# Patient Record
Sex: FEMALE | Race: WHITE | URBAN - NONMETROPOLITAN AREA
[De-identification: names, ages, dates, MRNs, and addresses within clinical notes are randomized per-mention and may not be internally consistent; named-entity substitution may affect disease eponyms.]

---

## 2017-01-04 RX ORDER — PAROXETINE HYDROCHLORIDE 20 MG/1
20 TABLET, FILM COATED ORAL EVERY MORNING
Qty: 30 TABLET | Refills: 0 | Status: SHIPPED | OUTPATIENT
Start: 2017-01-04 | End: 2017-02-03 | Stop reason: SDUPTHER

## 2017-01-13 ENCOUNTER — OFFICE VISIT (OUTPATIENT)
Dept: NEUROLOGY | Age: 72
End: 2017-01-13
Payer: MEDICARE

## 2017-01-13 VITALS
DIASTOLIC BLOOD PRESSURE: 76 MMHG | HEIGHT: 66 IN | BODY MASS INDEX: 27.64 KG/M2 | WEIGHT: 172 LBS | SYSTOLIC BLOOD PRESSURE: 122 MMHG

## 2017-01-13 DIAGNOSIS — R25.2 MUSCLE CRAMP: ICD-10-CM

## 2017-01-13 DIAGNOSIS — G25.81 RESTLESS LEG SYNDROME: ICD-10-CM

## 2017-01-13 DIAGNOSIS — G20 PARKINSON DISEASE (HCC): Primary | ICD-10-CM

## 2017-01-13 PROCEDURE — 1036F TOBACCO NON-USER: CPT | Performed by: PSYCHIATRY & NEUROLOGY

## 2017-01-13 PROCEDURE — G8427 DOCREV CUR MEDS BY ELIG CLIN: HCPCS | Performed by: PSYCHIATRY & NEUROLOGY

## 2017-01-13 PROCEDURE — 3014F SCREEN MAMMO DOC REV: CPT | Performed by: PSYCHIATRY & NEUROLOGY

## 2017-01-13 PROCEDURE — G8400 PT W/DXA NO RESULTS DOC: HCPCS | Performed by: PSYCHIATRY & NEUROLOGY

## 2017-01-13 PROCEDURE — 3017F COLORECTAL CA SCREEN DOC REV: CPT | Performed by: PSYCHIATRY & NEUROLOGY

## 2017-01-13 PROCEDURE — 1123F ACP DISCUSS/DSCN MKR DOCD: CPT | Performed by: PSYCHIATRY & NEUROLOGY

## 2017-01-13 PROCEDURE — 4040F PNEUMOC VAC/ADMIN/RCVD: CPT | Performed by: PSYCHIATRY & NEUROLOGY

## 2017-01-13 PROCEDURE — G8484 FLU IMMUNIZE NO ADMIN: HCPCS | Performed by: PSYCHIATRY & NEUROLOGY

## 2017-01-13 PROCEDURE — 99214 OFFICE O/P EST MOD 30 MIN: CPT | Performed by: PSYCHIATRY & NEUROLOGY

## 2017-01-13 PROCEDURE — G8420 CALC BMI NORM PARAMETERS: HCPCS | Performed by: PSYCHIATRY & NEUROLOGY

## 2017-01-13 PROCEDURE — 1090F PRES/ABSN URINE INCON ASSESS: CPT | Performed by: PSYCHIATRY & NEUROLOGY

## 2017-02-01 RX ORDER — PAROXETINE HYDROCHLORIDE 20 MG/1
TABLET, FILM COATED ORAL
Qty: 30 TABLET | Refills: 0 | OUTPATIENT
Start: 2017-02-01

## 2017-02-06 RX ORDER — PAROXETINE HYDROCHLORIDE 20 MG/1
20 TABLET, FILM COATED ORAL EVERY MORNING
Qty: 30 TABLET | Refills: 6 | OUTPATIENT
Start: 2017-02-06 | End: 2017-08-29 | Stop reason: SDUPTHER

## 2017-02-07 ENCOUNTER — TELEPHONE (OUTPATIENT)
Dept: NEUROLOGY | Age: 72
End: 2017-02-07

## 2017-03-20 ENCOUNTER — TELEPHONE (OUTPATIENT)
Dept: NEUROLOGY | Age: 72
End: 2017-03-20

## 2017-03-20 DIAGNOSIS — G25.81 RESTLESS LEG: ICD-10-CM

## 2017-03-20 DIAGNOSIS — R25.2 MUSCLE CRAMP: ICD-10-CM

## 2017-03-20 DIAGNOSIS — G20 PARKINSON DISEASE (HCC): Primary | ICD-10-CM

## 2017-03-21 ENCOUNTER — TELEPHONE (OUTPATIENT)
Dept: NEUROLOGY | Age: 72
End: 2017-03-21

## 2017-05-16 RX ORDER — ROPINIROLE 0.25 MG/1
0.25 TABLET, FILM COATED ORAL 3 TIMES DAILY
Qty: 90 TABLET | Refills: 11 | Status: SHIPPED | OUTPATIENT
Start: 2017-05-16 | End: 2018-05-10 | Stop reason: SDUPTHER

## 2017-05-26 ENCOUNTER — APPOINTMENT (OUTPATIENT)
Dept: GENERAL RADIOLOGY | Facility: HOSPITAL | Age: 72
End: 2017-05-26
Attending: FAMILY MEDICINE

## 2017-05-26 PROCEDURE — 71020 HC CHEST PA AND LATERAL: CPT

## 2017-05-26 PROCEDURE — 71100 X-RAY EXAM RIBS UNI 2 VIEWS: CPT

## 2017-07-14 ENCOUNTER — OFFICE VISIT (OUTPATIENT)
Dept: NEUROLOGY | Age: 72
End: 2017-07-14
Payer: MEDICARE

## 2017-07-14 VITALS
BODY MASS INDEX: 27.97 KG/M2 | SYSTOLIC BLOOD PRESSURE: 112 MMHG | WEIGHT: 174 LBS | DIASTOLIC BLOOD PRESSURE: 70 MMHG | HEIGHT: 66 IN

## 2017-07-14 DIAGNOSIS — G20 PARKINSON DISEASE (HCC): Primary | ICD-10-CM

## 2017-07-14 DIAGNOSIS — R25.2 MUSCLE CRAMP: ICD-10-CM

## 2017-07-14 DIAGNOSIS — G25.81 RESTLESS LEG: ICD-10-CM

## 2017-07-14 PROCEDURE — G8400 PT W/DXA NO RESULTS DOC: HCPCS | Performed by: PSYCHIATRY & NEUROLOGY

## 2017-07-14 PROCEDURE — G8427 DOCREV CUR MEDS BY ELIG CLIN: HCPCS | Performed by: PSYCHIATRY & NEUROLOGY

## 2017-07-14 PROCEDURE — 1090F PRES/ABSN URINE INCON ASSESS: CPT | Performed by: PSYCHIATRY & NEUROLOGY

## 2017-07-14 PROCEDURE — 99214 OFFICE O/P EST MOD 30 MIN: CPT | Performed by: PSYCHIATRY & NEUROLOGY

## 2017-07-14 PROCEDURE — G8419 CALC BMI OUT NRM PARAM NOF/U: HCPCS | Performed by: PSYCHIATRY & NEUROLOGY

## 2017-07-14 PROCEDURE — 1036F TOBACCO NON-USER: CPT | Performed by: PSYCHIATRY & NEUROLOGY

## 2017-07-14 PROCEDURE — 3014F SCREEN MAMMO DOC REV: CPT | Performed by: PSYCHIATRY & NEUROLOGY

## 2017-07-14 PROCEDURE — 4040F PNEUMOC VAC/ADMIN/RCVD: CPT | Performed by: PSYCHIATRY & NEUROLOGY

## 2017-07-14 PROCEDURE — 3017F COLORECTAL CA SCREEN DOC REV: CPT | Performed by: PSYCHIATRY & NEUROLOGY

## 2017-07-14 PROCEDURE — 1123F ACP DISCUSS/DSCN MKR DOCD: CPT | Performed by: PSYCHIATRY & NEUROLOGY

## 2017-07-28 ENCOUNTER — TELEPHONE (OUTPATIENT)
Dept: NEUROLOGY | Age: 72
End: 2017-07-28

## 2017-08-08 ENCOUNTER — TELEPHONE (OUTPATIENT)
Dept: NEUROLOGY | Age: 72
End: 2017-08-08

## 2017-08-10 ENCOUNTER — TELEPHONE (OUTPATIENT)
Dept: NEUROLOGY | Age: 72
End: 2017-08-10

## 2017-08-15 ENCOUNTER — TELEPHONE (OUTPATIENT)
Dept: NEUROLOGY | Age: 72
End: 2017-08-15

## 2017-08-29 ENCOUNTER — TELEPHONE (OUTPATIENT)
Dept: NEUROLOGY | Age: 72
End: 2017-08-29

## 2017-08-29 RX ORDER — PAROXETINE HYDROCHLORIDE 20 MG/1
20 TABLET, FILM COATED ORAL EVERY MORNING
Qty: 30 TABLET | Refills: 6 | Status: SHIPPED | OUTPATIENT
Start: 2017-08-29 | End: 2018-03-26 | Stop reason: SDUPTHER

## 2017-09-12 ENCOUNTER — TELEPHONE (OUTPATIENT)
Dept: NEUROLOGY | Age: 72
End: 2017-09-12

## 2017-10-16 ENCOUNTER — TELEPHONE (OUTPATIENT)
Dept: NEUROLOGY | Age: 72
End: 2017-10-16

## 2017-10-16 NOTE — TELEPHONE ENCOUNTER
Tyson from [address] [place] [address] called wanting to know if we received forms for patient.

## 2017-10-23 ENCOUNTER — OFFICE VISIT (OUTPATIENT)
Dept: NEUROLOGY | Age: 72
End: 2017-10-23
Payer: MEDICARE

## 2017-10-23 VITALS
HEIGHT: 66 IN | WEIGHT: 172 LBS | DIASTOLIC BLOOD PRESSURE: 65 MMHG | HEART RATE: 73 BPM | SYSTOLIC BLOOD PRESSURE: 111 MMHG | BODY MASS INDEX: 27.64 KG/M2

## 2017-10-23 DIAGNOSIS — R25.2 MUSCLE CRAMP: ICD-10-CM

## 2017-10-23 DIAGNOSIS — G20 PARKINSON DISEASE (HCC): Primary | ICD-10-CM

## 2017-10-23 DIAGNOSIS — G25.81 RESTLESS LEG: ICD-10-CM

## 2017-10-23 PROCEDURE — G8484 FLU IMMUNIZE NO ADMIN: HCPCS | Performed by: PSYCHIATRY & NEUROLOGY

## 2017-10-23 PROCEDURE — G8400 PT W/DXA NO RESULTS DOC: HCPCS | Performed by: PSYCHIATRY & NEUROLOGY

## 2017-10-23 PROCEDURE — 1123F ACP DISCUSS/DSCN MKR DOCD: CPT | Performed by: PSYCHIATRY & NEUROLOGY

## 2017-10-23 PROCEDURE — G8427 DOCREV CUR MEDS BY ELIG CLIN: HCPCS | Performed by: PSYCHIATRY & NEUROLOGY

## 2017-10-23 PROCEDURE — 4040F PNEUMOC VAC/ADMIN/RCVD: CPT | Performed by: PSYCHIATRY & NEUROLOGY

## 2017-10-23 PROCEDURE — 99214 OFFICE O/P EST MOD 30 MIN: CPT | Performed by: PSYCHIATRY & NEUROLOGY

## 2017-10-23 PROCEDURE — G8417 CALC BMI ABV UP PARAM F/U: HCPCS | Performed by: PSYCHIATRY & NEUROLOGY

## 2017-10-23 PROCEDURE — 3014F SCREEN MAMMO DOC REV: CPT | Performed by: PSYCHIATRY & NEUROLOGY

## 2017-10-23 PROCEDURE — 3017F COLORECTAL CA SCREEN DOC REV: CPT | Performed by: PSYCHIATRY & NEUROLOGY

## 2017-10-23 PROCEDURE — 1036F TOBACCO NON-USER: CPT | Performed by: PSYCHIATRY & NEUROLOGY

## 2017-10-23 PROCEDURE — 1090F PRES/ABSN URINE INCON ASSESS: CPT | Performed by: PSYCHIATRY & NEUROLOGY

## 2017-10-23 RX ORDER — CARBIDOPA, LEVODOPA AND ENTACAPONE 50; 200; 200 MG/1; MG/1; MG/1
1 TABLET, FILM COATED ORAL 3 TIMES DAILY
COMMUNITY
End: 2017-10-23 | Stop reason: CLARIF

## 2017-10-23 RX ORDER — APOMORPHINE HYDROCHLORIDE 30 MG/3ML
INJECTION SUBCUTANEOUS
COMMUNITY
End: 2018-05-25

## 2017-10-24 NOTE — PROGRESS NOTES
an extended discussion regarding these. Review of Systems    Constitutional  No fever or chills. No diaphoresis or significant fatigue. HENT   No tinnitus or significant hearing loss. Eyes  no sudden vision change or eye pain  Respiratory  no significant shortness of breath or cough  Cardiovascular  no chest pain No palpitations or significant leg swelling  Gastrointestinal  no abdominal swelling or pain. Genitourinary  No difficulty urinating, dysuria  Musculoskeletal  yes upper thigh pain back pain or myalgia. Skin  no color change or rash bruising from shot  Neurologic  No seizures. No lateralizing weakness. Hematologic  Yes easy bruising or excessive bleeding. Psychiatric  yes severe anxiety or nervousness. All other review of systems are negative.       /65   Pulse 73   Ht 5' 6\" (1.676 m)   Wt 172 lb (78 kg)   BMI 27.76 kg/m²     Constitutional  well developed, well nourished. HEENT  head normocephalic. Eyes  conjunctiva normal.  EOMS normal.  Neck- ROM appears normal, no tracheal deviation. Extremities -no edema     Musculoskeletal  ROM appears normal.  No significant edema. Skin  warm, dry, and intact. No rash, erythema, or pallor. Psychiatric  mood, affect, and behavior appear normal.      Neurological exam:    Mental Status-  Awake, alert fluent oriented x 3. Normal language and speech. Follows complex commands. Remotes memory intact. Cranial nerves II-XII intact to detailed testing. Fundoscopic exam unrevealing. Masked face  EOMI, no nystagmus. conjugate eye movements visual fields grossly unremarkable  Symmetric facies, LT intact  Hearing intact to finger rub  Palate elevates in midline, tongue midline    Motor exam V/V bilateral upper and lower extremities. Noted cogwheeling, normal tone. Mild dyskinesias noted     Sensation intact to LT/PP upper and lower extremities bilaterally, normal vibration sense    Reflexes 2+ throughout. No Babinski.  No

## 2017-11-28 PROBLEM — M81.0 OSTEOPOROSIS, POST-MENOPAUSAL: Status: ACTIVE | Noted: 2017-11-28

## 2017-12-04 ENCOUNTER — TELEPHONE (OUTPATIENT)
Dept: NEUROLOGY | Age: 72
End: 2017-12-04

## 2018-01-25 ENCOUNTER — OFFICE VISIT (OUTPATIENT)
Dept: NEUROLOGY | Age: 73
End: 2018-01-25
Payer: MEDICARE

## 2018-01-25 VITALS
SYSTOLIC BLOOD PRESSURE: 116 MMHG | DIASTOLIC BLOOD PRESSURE: 76 MMHG | BODY MASS INDEX: 26.84 KG/M2 | HEIGHT: 66 IN | WEIGHT: 167 LBS

## 2018-01-25 DIAGNOSIS — G25.81 RESTLESS LEG: ICD-10-CM

## 2018-01-25 DIAGNOSIS — G20 PARKINSON DISEASE (HCC): Primary | ICD-10-CM

## 2018-01-25 DIAGNOSIS — R25.2 MUSCLE CRAMP: ICD-10-CM

## 2018-01-25 PROCEDURE — 99214 OFFICE O/P EST MOD 30 MIN: CPT | Performed by: PSYCHIATRY & NEUROLOGY

## 2018-01-25 PROCEDURE — 3017F COLORECTAL CA SCREEN DOC REV: CPT | Performed by: PSYCHIATRY & NEUROLOGY

## 2018-01-25 PROCEDURE — G8427 DOCREV CUR MEDS BY ELIG CLIN: HCPCS | Performed by: PSYCHIATRY & NEUROLOGY

## 2018-01-25 PROCEDURE — 4040F PNEUMOC VAC/ADMIN/RCVD: CPT | Performed by: PSYCHIATRY & NEUROLOGY

## 2018-01-25 PROCEDURE — 1123F ACP DISCUSS/DSCN MKR DOCD: CPT | Performed by: PSYCHIATRY & NEUROLOGY

## 2018-01-25 PROCEDURE — G8417 CALC BMI ABV UP PARAM F/U: HCPCS | Performed by: PSYCHIATRY & NEUROLOGY

## 2018-01-25 PROCEDURE — 1036F TOBACCO NON-USER: CPT | Performed by: PSYCHIATRY & NEUROLOGY

## 2018-01-25 PROCEDURE — G8400 PT W/DXA NO RESULTS DOC: HCPCS | Performed by: PSYCHIATRY & NEUROLOGY

## 2018-01-25 PROCEDURE — 1090F PRES/ABSN URINE INCON ASSESS: CPT | Performed by: PSYCHIATRY & NEUROLOGY

## 2018-01-25 PROCEDURE — G8484 FLU IMMUNIZE NO ADMIN: HCPCS | Performed by: PSYCHIATRY & NEUROLOGY

## 2018-01-25 RX ORDER — RASAGILINE 0.5 MG/1
0.5 TABLET ORAL DAILY
Qty: 30 TABLET | Refills: 3 | Status: SHIPPED | OUTPATIENT
Start: 2018-01-25 | End: 2018-02-23

## 2018-01-25 NOTE — PROGRESS NOTES
REVIEW OF SYSTEMS    Constitutional: []Fever []Sweats []Chills [] Recent Injury   [x] Denies all unless marked  HENT:[]Headache  [] Head Injury  [] Sore Throat  [] Ear Pain  [] Dizziness [] Hearing Loss   [x] Denies all unless marked  Spine:  [] Neck pain  [] Back pain  [] Sciaticia  [x] Denies all unless marked  Cardiovascular:[]Chest Pain []Palpitations [] Heart Disease  [x] Denies all unless marked  Pulmonary: []Shortness of Breath []Cough   [x] Denies all unless marked  Gastrointestinal:  []Abdominal Pain  []Blood in Stool  []Diarrhea []Constipation [x]Nausea  [x]Vomiting  [] Denies all unless marked  Genitourinary:  [] Dysuria [] Frequency  [] Incontinence [] Urgency   [x] Denies all unless marked  Musculoskeletal: [] Arthralgia  [] Myalgias [] Muscle cramps  [x] Muscle twitches   [] Denies all unless marked   Extremities:   [] Pain   [] Swelling   [x] Denies all unless marked  Skin:[] Rash  [] Color Change  [x] Denies all unless marked  Neurological:[] Visual Disturbance [] Double Vision [] Slurred Speech [] Trouble swallowing  [] Vertigo [] Tingling [] Numbness [] Weakness [] Loss of Balance   [] Loss of Consciousness [] Memory Loss  [x] Denies all unless marked  Psychiatric/Behavioral:[] Depression [] Anxiety  [x] Denies all unless marked  Sleep: []  Insomnia [] Sleep Disturbance [] Snoring [] Restless Legs [] Daytime Sleepiness [] Sleep Apnea  [x] Denies all unless marked

## 2018-01-26 NOTE — PROGRESS NOTES
Alvin Collado is a 74y.o. year old female who is seen for evaluation of Parkinson's disease with occasional palpitations and dyskinesias. Currently taking regular Sinemet 25/100 2 times daily and CR 50/200 three times a day along with Requip 0.25 mg 3 times a day. .restless leg symptoms and muscle cramps are doing better. She has been using her Apokyne injections about 3-4 times a day on average. She still gets nauseated with that unfortunately. She does feel that it is beneficial overall. Also, in general, she feels that her Parkinson's disease is slowly getting worse and she is having more difficulty. Old records are reviewed in detail. We had a long talk regarding all of the above. Alvin Collado is a 74 y.o. female with the following history as recorded in [facility]: There are no active problems to display for this patient. Current Outpatient Prescriptions   Medication Sig Dispense Refill    rasagiline (AZILECT) 0.5 MG TABS Take 1 tablet by mouth daily 30 tablet 3    carbidopa-levodopa (SINEMET)  MG per tablet Take 2 tablets by mouth 3 times daily 180 tablet 11    trimethobenzamide (TIGAN) 300 MG capsule Take 1 capsule by mouth 3 times daily 270 capsule 0    Apomorphine HCl (APOKYN) 30 MG/3ML SOCT Inject into the skin      PARoxetine (PAXIL) 20 MG tablet Take 1 tablet by mouth every morning 30 tablet 6    rOPINIRole (REQUIP) 0.25 MG tablet Take 1 tablet by mouth 3 times daily 90 tablet 11    levothyroxine (SYNTHROID) 75 MCG tablet Take 75 mcg by mouth daily        No current facility-administered medications for this visit. Allergies: Contrast [iodides] and Sulfa antibiotics  Past Medical History:   Diagnosis Date    Hyperthyroidism     Parkinson disease ([facility] [address]      Past Surgical History:   Procedure Laterality Date    BRAIN SURGERY      CHOLECYSTECTOMY      THYROIDECTOMY, PARTIAL       History reviewed. No pertinent family history.   Social History   Substance Use Topics    Smoking

## 2018-02-23 ENCOUNTER — OFFICE VISIT (OUTPATIENT)
Dept: NEUROLOGY | Age: 73
End: 2018-02-23
Payer: MEDICARE

## 2018-02-23 VITALS
HEIGHT: 66 IN | WEIGHT: 167 LBS | DIASTOLIC BLOOD PRESSURE: 80 MMHG | BODY MASS INDEX: 26.84 KG/M2 | SYSTOLIC BLOOD PRESSURE: 160 MMHG

## 2018-02-23 DIAGNOSIS — G25.81 RESTLESS LEG: ICD-10-CM

## 2018-02-23 DIAGNOSIS — G20 PARKINSON DISEASE (HCC): Primary | ICD-10-CM

## 2018-02-23 DIAGNOSIS — R25.2 MUSCLE CRAMP: ICD-10-CM

## 2018-02-23 PROCEDURE — G8484 FLU IMMUNIZE NO ADMIN: HCPCS | Performed by: PSYCHIATRY & NEUROLOGY

## 2018-02-23 PROCEDURE — 1036F TOBACCO NON-USER: CPT | Performed by: PSYCHIATRY & NEUROLOGY

## 2018-02-23 PROCEDURE — 4040F PNEUMOC VAC/ADMIN/RCVD: CPT | Performed by: PSYCHIATRY & NEUROLOGY

## 2018-02-23 PROCEDURE — G8400 PT W/DXA NO RESULTS DOC: HCPCS | Performed by: PSYCHIATRY & NEUROLOGY

## 2018-02-23 PROCEDURE — 1090F PRES/ABSN URINE INCON ASSESS: CPT | Performed by: PSYCHIATRY & NEUROLOGY

## 2018-02-23 PROCEDURE — 3017F COLORECTAL CA SCREEN DOC REV: CPT | Performed by: PSYCHIATRY & NEUROLOGY

## 2018-02-23 PROCEDURE — 1123F ACP DISCUSS/DSCN MKR DOCD: CPT | Performed by: PSYCHIATRY & NEUROLOGY

## 2018-02-23 PROCEDURE — G8417 CALC BMI ABV UP PARAM F/U: HCPCS | Performed by: PSYCHIATRY & NEUROLOGY

## 2018-02-23 PROCEDURE — 99214 OFFICE O/P EST MOD 30 MIN: CPT | Performed by: PSYCHIATRY & NEUROLOGY

## 2018-02-23 PROCEDURE — G8427 DOCREV CUR MEDS BY ELIG CLIN: HCPCS | Performed by: PSYCHIATRY & NEUROLOGY

## 2018-02-23 NOTE — PROGRESS NOTES
REVIEW OF SYSTEMS    Constitutional: []Fever []Sweats []Chills [] Recent Injury   [x] Denies all unless marked  HENT:[]Headache  [] Head Injury  [] Sore Throat  [] Ear Pain  [] Dizziness [] Hearing Loss   [x] Denies all unless marked  Spine:  [] Neck pain  [] Back pain  [] Sciaticia  [x] Denies all unless marked  Cardiovascular:[]Chest Pain []Palpitations [] Heart Disease  [x] Denies all unless marked  Pulmonary: []Shortness of Breath []Cough   [x] Denies all unless marked  Gastrointestinal:  []Abdominal Pain  []Blood in Stool  []Diarrhea []Constipation []Nausea  []Vomiting  [x] Denies all unless marked  Genitourinary:  [] Dysuria [] Frequency  [] Incontinence [] Urgency   [x] Denies all unless marked  Musculoskeletal: [] Arthralgia  [] Myalgias [] Muscle cramps  [] Muscle twitches   [x] Denies all unless marked   Extremities:   [] Pain   [] Swelling   [x] Denies all unless marked  Skin:[] Rash  [] Color Change  [x] Denies all unless marked  Neurological:[] Visual Disturbance [] Double Vision [] Slurred Speech [] Trouble swallowing  [] Vertigo [] Tingling [] Numbness [] Weakness [] Loss of Balance   [] Loss of Consciousness [] Memory Loss [] Seizures  [x] Denies all unless marked  Psychiatric/Behavioral:[] Depression [] Anxiety  [x] Denies all unless marked  Sleep: []  Insomnia [] Sleep Disturbance [] Snoring [] Restless Legs [] Daytime Sleepiness [] Sleep Apnea  [x] Denies all unless marked
edema. Skin  warm, dry, and intact. No rash, erythema, or pallor. Psychiatric  mood, affect, and behavior appear normal.      Neurological exam:    Mental Status-  Awake, alert fluent oriented x 3. Normal language and speech. Follows complex commands. Remotes memory intact. Cranial nerves II-XII intact to detailed testing. Fundoscopic exam unrevealing. Masked face  EOMI, no nystagmus. conjugate eye movements visual fields grossly unremarkable  Symmetric facies,   Hearing intact   Palate elevates in midline, tongue midline    Motor exam V/V bilateral upper and lower extremities. Noted cogwheeling, normal tone. Sensation intact to LT/PP upper and lower extremities bilaterally, normal vibration sense    Reflexes 2+ throughout. No Babinski. No pathological reflexes noted. Cerebellar:  Resting tremor in the bilateral upper extremities noted intermittently. FTN, TAL, HTS intact    Gait-good stride. Good arm swing. Turns en bloc      Assessment    ICD-10-CM ICD-9-CM    1. [facility] ([facility] G20 332.0    2. Restless leg G25.81 333.94    3. Muscle cramp R25.2 729.82    Restless legs and muscle cramping are stable. Parkinson's disease appears to be worse. She has been given samples of Xadago. She was warned of potential side effects and potential drug drug interactions. Plan  Continue as is. Daily exercise was stressed. .    Return in about 3 months (around 5/23/2018).

## 2018-02-27 ENCOUNTER — TELEPHONE (OUTPATIENT)
Dept: NEUROLOGY | Age: 73
End: 2018-02-27

## 2018-03-27 RX ORDER — PAROXETINE HYDROCHLORIDE 20 MG/1
TABLET, FILM COATED ORAL
Qty: 30 TABLET | Refills: 6 | Status: SHIPPED | OUTPATIENT
Start: 2018-03-27 | End: 2018-05-25 | Stop reason: SDUPTHER

## 2018-03-28 ENCOUNTER — TELEPHONE (OUTPATIENT)
Dept: NEUROLOGY | Age: 73
End: 2018-03-28

## 2018-04-11 ENCOUNTER — TELEPHONE (OUTPATIENT)
Dept: NEUROLOGY | Age: 73
End: 2018-04-11

## 2018-04-11 RX ORDER — CARBIDOPA AND LEVODOPA 50; 200 MG/1; MG/1
TABLET, EXTENDED RELEASE ORAL
Qty: 90 TABLET | Refills: 11 | Status: SHIPPED | OUTPATIENT
Start: 2018-04-11 | End: 2019-04-01 | Stop reason: SDUPTHER

## 2018-05-11 RX ORDER — ROPINIROLE 0.25 MG/1
TABLET, FILM COATED ORAL
Qty: 90 TABLET | Refills: 11 | Status: SHIPPED | OUTPATIENT
Start: 2018-05-11 | End: 2019-03-21

## 2018-05-25 ENCOUNTER — OFFICE VISIT (OUTPATIENT)
Dept: NEUROLOGY | Age: 73
End: 2018-05-25
Payer: MEDICARE

## 2018-05-25 VITALS
BODY MASS INDEX: 26.2 KG/M2 | SYSTOLIC BLOOD PRESSURE: 130 MMHG | HEIGHT: 66 IN | DIASTOLIC BLOOD PRESSURE: 72 MMHG | WEIGHT: 163 LBS

## 2018-05-25 DIAGNOSIS — R25.2 MUSCLE CRAMP: ICD-10-CM

## 2018-05-25 DIAGNOSIS — G25.81 RESTLESS LEG: ICD-10-CM

## 2018-05-25 DIAGNOSIS — G20 PARKINSON DISEASE (HCC): Primary | ICD-10-CM

## 2018-05-25 PROCEDURE — G8400 PT W/DXA NO RESULTS DOC: HCPCS | Performed by: PSYCHIATRY & NEUROLOGY

## 2018-05-25 PROCEDURE — 3017F COLORECTAL CA SCREEN DOC REV: CPT | Performed by: PSYCHIATRY & NEUROLOGY

## 2018-05-25 PROCEDURE — G8427 DOCREV CUR MEDS BY ELIG CLIN: HCPCS | Performed by: PSYCHIATRY & NEUROLOGY

## 2018-05-25 PROCEDURE — 1123F ACP DISCUSS/DSCN MKR DOCD: CPT | Performed by: PSYCHIATRY & NEUROLOGY

## 2018-05-25 PROCEDURE — 4040F PNEUMOC VAC/ADMIN/RCVD: CPT | Performed by: PSYCHIATRY & NEUROLOGY

## 2018-05-25 PROCEDURE — G8417 CALC BMI ABV UP PARAM F/U: HCPCS | Performed by: PSYCHIATRY & NEUROLOGY

## 2018-05-25 PROCEDURE — 1036F TOBACCO NON-USER: CPT | Performed by: PSYCHIATRY & NEUROLOGY

## 2018-05-25 PROCEDURE — 99214 OFFICE O/P EST MOD 30 MIN: CPT | Performed by: PSYCHIATRY & NEUROLOGY

## 2018-05-25 PROCEDURE — 1090F PRES/ABSN URINE INCON ASSESS: CPT | Performed by: PSYCHIATRY & NEUROLOGY

## 2018-05-25 RX ORDER — PAROXETINE HYDROCHLORIDE 20 MG/1
TABLET, FILM COATED ORAL
Qty: 30 TABLET | Refills: 5 | Status: SHIPPED | OUTPATIENT
Start: 2018-05-25 | End: 2019-04-01 | Stop reason: SDUPTHER

## 2018-09-06 ENCOUNTER — HOSPITAL ENCOUNTER (EMERGENCY)
Facility: HOSPITAL | Age: 73
Discharge: HOME | End: 2018-09-06
Attending: EMERGENCY MEDICINE | Admitting: EMERGENCY MEDICINE

## 2018-09-06 ENCOUNTER — APPOINTMENT (OUTPATIENT)
Dept: CT IMAGING | Facility: HOSPITAL | Age: 73
End: 2018-09-06

## 2018-09-06 VITALS
HEART RATE: 74 BPM | RESPIRATION RATE: 16 BRPM | DIASTOLIC BLOOD PRESSURE: 76 MMHG | WEIGHT: 161 LBS | SYSTOLIC BLOOD PRESSURE: 170 MMHG | TEMPERATURE: 98.7 F | OXYGEN SATURATION: 97 % | BODY MASS INDEX: 25.88 KG/M2 | HEIGHT: 66 IN
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
## 2018-09-06 DIAGNOSIS — S00.83XA CONTUSION OF FOREHEAD, INITIAL ENCOUNTER: ICD-10-CM

## 2018-09-06 PROCEDURE — 99283 EMERGENCY DEPT VISIT LOW MDM: CPT

## 2018-09-06 PROCEDURE — 70450 CT HEAD/BRAIN W/O DYE: CPT

## 2018-09-06 RX ORDER — PAROXETINE HYDROCHLORIDE 20 MG/1
TABLET, FILM COATED ORAL
Status: ON HOLD | COMMUNITY
Start: 2018-05-25 | End: 2018-11-24

## 2018-09-06 NOTE — ED PROVIDER NOTES
Subjective   Patient is a 73-year-old female with history of Parkinson's who presents with fall.  Patient was attempting to walk in from her garage to her house and fell forward.  She hit her top of the forehead.  No LOC.  Denies any neck pain.  Denies any extremity pain.  She was able to stand up and walk to her neighbors.  She notes a 6 out of 10 headache.  Diffuse in nature.  No radiation.  All.  No associated vision changes, photophobia, neck pain or stiffness, fevers, slurred speech, confusion.  She is at her baseline mental status according to her friend.            Review of Systems   Constitutional: Negative for fever.   HENT: Negative for sore throat.    Eyes: Negative for visual disturbance.   Respiratory: Negative for shortness of breath.    Cardiovascular: Negative for chest pain.   Gastrointestinal: Negative for abdominal pain.   Genitourinary: Negative for hematuria.   Musculoskeletal: Negative for back pain.   Skin: Negative for rash.   Neurological: Positive for headaches. Negative for dizziness, tremors, seizures, syncope, speech difficulty, weakness, light-headedness and numbness.       Past Medical History:   Diagnosis Date   • Depression    • Disease of thyroid gland    • Parkinson disease (CMS/HCC)        Allergies   Allergen Reactions   • Iodinated Diagnostic Agents    • Sulfa Antibiotics        Past Surgical History:   Procedure Laterality Date   • CHOLECYSTECTOMY     • CYST REMOVAL         History reviewed. No pertinent family history.    Social History     Social History   • Marital status:      Social History Main Topics   • Smoking status: Never Smoker   • Alcohol use No   • Drug use: No     Other Topics Concern   • Not on file       Lab Results (last 24 hours)     ** No results found for the last 24 hours. **          Objective   Physical Exam   Constitutional: She is oriented to person, place, and time. She appears well-nourished. No distress.   HENT:   Head: Head is with abrasion  "and with contusion. Head is without raccoon's eyes, without Ramires's sign, without laceration, without right periorbital erythema and without left periorbital erythema. Hair is normal.       Right Ear: Tympanic membrane normal.   Left Ear: Tympanic membrane normal.   Nose: No nasal septal hematoma.   Mouth/Throat: Uvula is midline, oropharynx is clear and moist and mucous membranes are normal.   Eyes: Pupils are equal, round, and reactive to light. EOM are normal.   Neck: Normal range of motion. Neck supple. No spinous process tenderness and no muscular tenderness present. Normal range of motion present.   Cardiovascular: Normal rate, regular rhythm, normal heart sounds and intact distal pulses.  Exam reveals no gallop and no friction rub.    No murmur heard.  Pulmonary/Chest: Effort normal and breath sounds normal. No respiratory distress. She exhibits no tenderness.   Abdominal: Soft. There is no tenderness.   Musculoskeletal: Normal range of motion. She exhibits no edema or tenderness.        Cervical back: Normal.        Thoracic back: Normal.        Lumbar back: Normal.   Neurological: She is alert and oriented to person, place, and time. No cranial nerve deficit.   Skin: Skin is warm and dry. Capillary refill takes less than 2 seconds.   Psychiatric: She has a normal mood and affect.   Nursing note and vitals reviewed.      Procedures         CT Head Without Contrast   Final Result   1. No acute intracranial findings.   2. Sequela of chronic microvascular ischemia.       This report was finalized on 09/06/2018 15:36 by Dr. Srikanth Monge MD.          /62   Pulse 84   Temp 98.7 °F (37.1 °C)   Resp 18   Ht 167.6 cm (66\")   Wt 73 kg (161 lb)   SpO2 97%   BMI 25.99 kg/m²     ED Course    ED Course as of Sep 06 1544   Thu Sep 06, 2018   1452 NEXUS 0.   [TH]   1532 This is a 73-year-old female with Parkinson's who presents with mechanical fall.  Patient does have soft tissue contusion over her forehead. "  No LOC.  No neck tenderness.  Nexus 0 no extremity pain or deformity.  She is well-appearing and at baseline mental status.  No anticoagulation.  Head CT was negative.  No signs of entrapment, septal hematoma.  [TH]   1541 Head CT does show sequelae of chronic ischemia.  Neurologic exam is normal.  We will discharge home.  [TH]      ED Course User Index  [TH] Shelton Clark MD       Medications - No data to display         MDM  Number of Diagnoses or Management Options  Contusion of forehead, initial encounter: new and requires workup  Fall, initial encounter: new and requires workup     Amount and/or Complexity of Data Reviewed  Tests in the radiology section of CPT®: ordered and reviewed    Risk of Complications, Morbidity, and/or Mortality  Presenting problems: low  Diagnostic procedures: low  Management options: minimal    Patient Progress  Patient progress: stable      Final diagnoses:   Fall, initial encounter   Contusion of forehead, initial encounter          Shelton Clark MD  09/06/18 1547

## 2018-09-27 ENCOUNTER — OFFICE VISIT (OUTPATIENT)
Dept: NEUROLOGY | Age: 73
End: 2018-09-27
Payer: MEDICARE

## 2018-09-27 VITALS
BODY MASS INDEX: 25.88 KG/M2 | HEIGHT: 66 IN | WEIGHT: 161 LBS | DIASTOLIC BLOOD PRESSURE: 72 MMHG | SYSTOLIC BLOOD PRESSURE: 112 MMHG

## 2018-09-27 DIAGNOSIS — G20 PARKINSON DISEASE (HCC): Primary | ICD-10-CM

## 2018-09-27 DIAGNOSIS — G20 DYSKINESIA DUE TO PARKINSON'S DISEASE (HCC): ICD-10-CM

## 2018-09-27 DIAGNOSIS — G25.81 RESTLESS LEG SYNDROME: ICD-10-CM

## 2018-09-27 DIAGNOSIS — G24.9 DYSKINESIA DUE TO PARKINSON'S DISEASE (HCC): ICD-10-CM

## 2018-09-27 PROCEDURE — G8427 DOCREV CUR MEDS BY ELIG CLIN: HCPCS | Performed by: PSYCHIATRY & NEUROLOGY

## 2018-09-27 PROCEDURE — 1036F TOBACCO NON-USER: CPT | Performed by: PSYCHIATRY & NEUROLOGY

## 2018-09-27 PROCEDURE — 3017F COLORECTAL CA SCREEN DOC REV: CPT | Performed by: PSYCHIATRY & NEUROLOGY

## 2018-09-27 PROCEDURE — G8400 PT W/DXA NO RESULTS DOC: HCPCS | Performed by: PSYCHIATRY & NEUROLOGY

## 2018-09-27 PROCEDURE — 1090F PRES/ABSN URINE INCON ASSESS: CPT | Performed by: PSYCHIATRY & NEUROLOGY

## 2018-09-27 PROCEDURE — 4040F PNEUMOC VAC/ADMIN/RCVD: CPT | Performed by: PSYCHIATRY & NEUROLOGY

## 2018-09-27 PROCEDURE — 1101F PT FALLS ASSESS-DOCD LE1/YR: CPT | Performed by: PSYCHIATRY & NEUROLOGY

## 2018-09-27 PROCEDURE — G8417 CALC BMI ABV UP PARAM F/U: HCPCS | Performed by: PSYCHIATRY & NEUROLOGY

## 2018-09-27 PROCEDURE — 99214 OFFICE O/P EST MOD 30 MIN: CPT | Performed by: PSYCHIATRY & NEUROLOGY

## 2018-09-27 PROCEDURE — 1123F ACP DISCUSS/DSCN MKR DOCD: CPT | Performed by: PSYCHIATRY & NEUROLOGY

## 2018-09-30 NOTE — PROGRESS NOTES
History   Substance Use Topics    Smoking status: Never Smoker    Smokeless tobacco: Never Used    Alcohol use No       All old records and recent tests reviewed. We had an extended discussion regarding these. Review of Systems    Constitutional: []Fever []Sweats []Chills [] Recent Injury   [x] Denies all unless marked  HENT:[]Headache  [] Head Injury  [] Sore Throat  [] Ear Pain  [] Dizziness [] Hearing Loss   [x] Denies all unless marked  Spine:  [] Neck pain  [] Back pain  [] Sciaticia  [x] Denies all unless marked  Cardiovascular:[]Chest Pain []Palpitations [] Heart Disease  [x] Denies all unless marked  Pulmonary: []Shortness of Breath []Cough   [x] Denies all unless marked  Gastrointestinal:  []Abdominal Pain  []Blood in Stool  []Diarrhea []Constipation []Nausea  []Vomiting  [x] Denies all unless marked  Genitourinary:  [] Dysuria [] Frequency  [] Incontinence [] Urgency   [x] Denies all unless marked  Musculoskeletal: [] Arthralgia  [] Myalgias [] Muscle cramps  [] Muscle twitches   [x] Denies all unless marked   Extremities:   [] Pain   [] Swelling   [x] Denies all unless marked  Skin:[] Rash  [] Color Change  [x] Denies all unless marked  Neurological:[] Visual Disturbance [] Double Vision [] Slurred Speech [] Trouble swallowing  [] Vertigo [] Tingling [] Numbness [] Weakness [] Loss of Balance   [] Loss of Consciousness [] Memory Loss [] Seizures  [x] Denies all unless marked  Psychiatric/Behavioral:[] Depression [] Anxiety  [x] Denies all unless marked  Sleep: []  Insomnia [] Sleep Disturbance [] Snoring [] Restless Legs [] Daytime Sleepiness [] Sleep Apnea  [x] Denies all unless marked       /72 Comment: due to patient having parkinson's not able to get BP  Ht 5' 6\" (1.676 m)   Wt 161 lb (73 kg)   BMI 25.99 kg/m²     Constitutional  well developed, well nourished. HEENT  head normocephalic.   Eyes  conjunctiva normal.  EOMS normal.  Neck- ROM appears normal, no tracheal deviation. Extremities -no edema     Musculoskeletal  ROM appears normal.  No significant edema. Skin  warm, dry, and intact. No rash, erythema, or pallor. Psychiatric  mood, affect, and behavior appear normal.      Neurological exam:    Mental Status-  Awake, alert fluent oriented x 3. Normal language and speech. Follows complex commands. Remotes memory intact. Cranial nerves II-XII intact to detailed testing. Fundoscopic exam unrevealing. Masked face  EOMI, no nystagmus. conjugate eye movements visual fields grossly unremarkable  Symmetric facies,   Hearing intact   Palate elevates in midline, tongue midline    Motor exam V/V bilateral upper and lower extremities. Noted cogwheeling, normal tone. Reflexes 2+ throughout. No Babinski. No pathological reflexes noted. Cerebellar:  Resting tremor noted today. She has prominent dyskinesias  FTN, TAL intact    Gait-good stride. Good arm swing. Turns en bloc      Assessment    ICD-10-CM ICD-9-CM    1. Parkinson disease ([address] G20 332.0    2. Restless leg syndrome G25.81 333.94    3. 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 Parkinson's disease appears to be doing well at this time. However her dyskinesias are troublesome. I have recommended Gocorvi. We will get her set up on that. Plan  . Daily exercise was stressed. .    Return in about 3 months (around 12/27/2018).

## 2018-11-21 ENCOUNTER — TELEPHONE (OUTPATIENT)
Dept: NEUROLOGY | Age: 73
End: 2018-11-21

## 2018-11-24 ENCOUNTER — APPOINTMENT (OUTPATIENT)
Dept: GENERAL RADIOLOGY | Facility: HOSPITAL | Age: 73
End: 2018-11-24

## 2018-11-24 ENCOUNTER — HOSPITAL ENCOUNTER (INPATIENT)
Facility: HOSPITAL | Age: 73
LOS: 3 days | Discharge: HOME HEALTH | End: 2018-11-27
Attending: EMERGENCY MEDICINE | Admitting: FAMILY MEDICINE

## 2018-11-24 ENCOUNTER — APPOINTMENT (OUTPATIENT)
Dept: CT IMAGING | Facility: HOSPITAL | Age: 73
End: 2018-11-24
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
## 2018-11-24 DIAGNOSIS — R41.82 ALTERED MENTAL STATUS, UNSPECIFIED ALTERED MENTAL STATUS TYPE: ICD-10-CM

## 2018-11-24 DIAGNOSIS — Z78.9 DECREASED ACTIVITIES OF DAILY LIVING (ADL): ICD-10-CM

## 2018-11-24 DIAGNOSIS — N30.90 CYSTITIS: ICD-10-CM

## 2018-11-24 DIAGNOSIS — Z74.09 IMPAIRED FUNCTIONAL MOBILITY, BALANCE, GAIT, AND ENDURANCE: ICD-10-CM

## 2018-11-24 DIAGNOSIS — R13.10 DYSPHAGIA, UNSPECIFIED TYPE: ICD-10-CM

## 2018-11-24 LAB
ALBUMIN SERPL-MCNC: 4.5 G/DL (ref 3.5–5)
ALBUMIN/GLOB SERPL: 1.3 G/DL (ref 1.1–2.5)
ALP SERPL-CCNC: 107 U/L (ref 24–120)
ALT SERPL W P-5'-P-CCNC: 36 U/L (ref 0–54)
ANION GAP SERPL CALCULATED.3IONS-SCNC: 17 MMOL/L (ref 4–13)
APTT PPP: 25.8 SECONDS (ref 24.1–34.8)
AST SERPL-CCNC: 159 U/L (ref 7–45)
BACTERIA UR QL AUTO: ABNORMAL /HPF
BASOPHILS # BLD AUTO: 0.03 10*3/MM3 (ref 0–0.2)
BASOPHILS NFR BLD AUTO: 0.2 % (ref 0–2)
BILIRUB SERPL-MCNC: 1.7 MG/DL (ref 0.1–1)
BILIRUB UR QL STRIP: ABNORMAL
BUN BLD-MCNC: 32 MG/DL (ref 5–21)
BUN/CREAT SERPL: 27.1 (ref 7–25)
CALCIUM SPEC-SCNC: 10.1 MG/DL (ref 8.4–10.4)
CHLORIDE SERPL-SCNC: 104 MMOL/L (ref 98–110)
CK SERPL-CCNC: 7203 U/L (ref 0–203)
CLARITY UR: ABNORMAL
CO2 SERPL-SCNC: 25 MMOL/L (ref 24–31)
COLOR UR: ABNORMAL
CREAT BLD-MCNC: 1.18 MG/DL (ref 0.5–1.4)
D-LACTATE SERPL-SCNC: 1.6 MMOL/L (ref 0.5–2)
DEPRECATED RDW RBC AUTO: 43.6 FL (ref 40–54)
EOSINOPHIL # BLD AUTO: 0 10*3/MM3 (ref 0–0.7)
EOSINOPHIL NFR BLD AUTO: 0 % (ref 0–4)
ERYTHROCYTE [DISTWIDTH] IN BLOOD BY AUTOMATED COUNT: 13 % (ref 12–15)
GFR SERPL CREATININE-BSD FRML MDRD: 45 ML/MIN/1.73
GLOBULIN UR ELPH-MCNC: 3.4 GM/DL
GLUCOSE BLD-MCNC: 163 MG/DL (ref 70–100)
GLUCOSE UR STRIP-MCNC: NEGATIVE MG/DL
HCT VFR BLD AUTO: 44.8 % (ref 37–47)
HGB BLD-MCNC: 15.3 G/DL (ref 12–16)
HGB UR QL STRIP.AUTO: ABNORMAL
HYALINE CASTS UR QL AUTO: ABNORMAL /LPF
IMM GRANULOCYTES # BLD: 0.05 10*3/MM3 (ref 0–0.03)
IMM GRANULOCYTES NFR BLD: 0.3 % (ref 0–5)
INR PPP: 0.95 (ref 0.91–1.09)
KETONES UR QL STRIP: ABNORMAL
LEUKOCYTE ESTERASE UR QL STRIP.AUTO: ABNORMAL
LYMPHOCYTES # BLD AUTO: 0.8 10*3/MM3 (ref 0.72–4.86)
LYMPHOCYTES NFR BLD AUTO: 4.9 % (ref 15–45)
MCH RBC QN AUTO: 31.4 PG (ref 28–32)
MCHC RBC AUTO-ENTMCNC: 34.2 G/DL (ref 33–36)
MCV RBC AUTO: 91.8 FL (ref 82–98)
MONOCYTES # BLD AUTO: 1.53 10*3/MM3 (ref 0.19–1.3)
MONOCYTES NFR BLD AUTO: 9.3 % (ref 4–12)
NEUTROPHILS # BLD AUTO: 13.99 10*3/MM3 (ref 1.87–8.4)
NEUTROPHILS NFR BLD AUTO: 85.3 % (ref 39–78)
NITRITE UR QL STRIP: NEGATIVE
NRBC BLD MANUAL-RTO: 0 /100 WBC (ref 0–0)
OSMOLALITY UR: 467 MOSM/KG (ref 601–850)
PH UR STRIP.AUTO: <=5 [PH] (ref 5–8)
PLATELET # BLD AUTO: 253 10*3/MM3 (ref 130–400)
PMV BLD AUTO: 9.6 FL (ref 6–12)
POTASSIUM BLD-SCNC: 3.9 MMOL/L (ref 3.5–5.3)
PROT SERPL-MCNC: 7.9 G/DL (ref 6.3–8.7)
PROT UR QL STRIP: ABNORMAL
PROTHROMBIN TIME: 13 SECONDS (ref 11.9–14.6)
RBC # BLD AUTO: 4.88 10*6/MM3 (ref 4.2–5.4)
RBC # UR: ABNORMAL /HPF
REF LAB TEST METHOD: ABNORMAL
SODIUM BLD-SCNC: 146 MMOL/L (ref 135–145)
SODIUM UR-SCNC: 34 MMOL/L (ref 30–90)
SP GR UR STRIP: 1.02 (ref 1–1.03)
SQUAMOUS #/AREA URNS HPF: ABNORMAL /HPF
UROBILINOGEN UR QL STRIP: ABNORMAL
WBC NRBC COR # BLD: 16.4 10*3/MM3 (ref 4.8–10.8)
WBC UR QL AUTO: ABNORMAL /HPF

## 2018-11-24 PROCEDURE — 82550 ASSAY OF CK (CPK): CPT | Performed by: EMERGENCY MEDICINE

## 2018-11-24 PROCEDURE — 83930 ASSAY OF BLOOD OSMOLALITY: CPT | Performed by: FAMILY MEDICINE

## 2018-11-24 PROCEDURE — 73070 X-RAY EXAM OF ELBOW: CPT

## 2018-11-24 PROCEDURE — 71045 X-RAY EXAM CHEST 1 VIEW: CPT

## 2018-11-24 PROCEDURE — 84300 ASSAY OF URINE SODIUM: CPT | Performed by: FAMILY MEDICINE

## 2018-11-24 PROCEDURE — 87040 BLOOD CULTURE FOR BACTERIA: CPT | Performed by: FAMILY MEDICINE

## 2018-11-24 PROCEDURE — P9612 CATHETERIZE FOR URINE SPEC: HCPCS

## 2018-11-24 PROCEDURE — 83605 ASSAY OF LACTIC ACID: CPT | Performed by: EMERGENCY MEDICINE

## 2018-11-24 PROCEDURE — 80053 COMPREHEN METABOLIC PANEL: CPT | Performed by: EMERGENCY MEDICINE

## 2018-11-24 PROCEDURE — 83935 ASSAY OF URINE OSMOLALITY: CPT | Performed by: FAMILY MEDICINE

## 2018-11-24 PROCEDURE — 73080 X-RAY EXAM OF ELBOW: CPT

## 2018-11-24 PROCEDURE — 85730 THROMBOPLASTIN TIME PARTIAL: CPT | Performed by: EMERGENCY MEDICINE

## 2018-11-24 PROCEDURE — 25010000002 HEPARIN (PORCINE) PER 1000 UNITS: Performed by: FAMILY MEDICINE

## 2018-11-24 PROCEDURE — 80074 ACUTE HEPATITIS PANEL: CPT | Performed by: FAMILY MEDICINE

## 2018-11-24 PROCEDURE — 70450 CT HEAD/BRAIN W/O DYE: CPT

## 2018-11-24 PROCEDURE — 81001 URINALYSIS AUTO W/SCOPE: CPT | Performed by: EMERGENCY MEDICINE

## 2018-11-24 PROCEDURE — 73502 X-RAY EXAM HIP UNI 2-3 VIEWS: CPT

## 2018-11-24 PROCEDURE — 72125 CT NECK SPINE W/O DYE: CPT

## 2018-11-24 PROCEDURE — 36415 COLL VENOUS BLD VENIPUNCTURE: CPT | Performed by: FAMILY MEDICINE

## 2018-11-24 PROCEDURE — 73060 X-RAY EXAM OF HUMERUS: CPT

## 2018-11-24 PROCEDURE — 85610 PROTHROMBIN TIME: CPT | Performed by: EMERGENCY MEDICINE

## 2018-11-24 PROCEDURE — 85025 COMPLETE CBC W/AUTO DIFF WBC: CPT | Performed by: EMERGENCY MEDICINE

## 2018-11-24 PROCEDURE — 25010000002 CEFTRIAXONE PER 250 MG: Performed by: EMERGENCY MEDICINE

## 2018-11-24 PROCEDURE — 99285 EMERGENCY DEPT VISIT HI MDM: CPT

## 2018-11-24 RX ORDER — ONDANSETRON 2 MG/ML
4 INJECTION INTRAMUSCULAR; INTRAVENOUS EVERY 6 HOURS PRN
Status: DISCONTINUED | OUTPATIENT
Start: 2018-11-24 | End: 2018-11-27 | Stop reason: HOSPADM

## 2018-11-24 RX ORDER — HEPARIN SODIUM 5000 [USP'U]/ML
5000 INJECTION, SOLUTION INTRAVENOUS; SUBCUTANEOUS EVERY 12 HOURS SCHEDULED
Status: DISCONTINUED | OUTPATIENT
Start: 2018-11-24 | End: 2018-11-25

## 2018-11-24 RX ORDER — LEVOTHYROXINE SODIUM 0.07 MG/1
75 TABLET ORAL
Status: DISCONTINUED | OUTPATIENT
Start: 2018-11-25 | End: 2018-11-27 | Stop reason: HOSPADM

## 2018-11-24 RX ORDER — SODIUM CHLORIDE 0.9 % (FLUSH) 0.9 %
3 SYRINGE (ML) INJECTION EVERY 12 HOURS SCHEDULED
Status: DISCONTINUED | OUTPATIENT
Start: 2018-11-24 | End: 2018-11-27 | Stop reason: HOSPADM

## 2018-11-24 RX ORDER — LEVOTHYROXINE SODIUM 0.07 MG/1
75 TABLET ORAL DAILY
COMMUNITY

## 2018-11-24 RX ORDER — PAROXETINE HYDROCHLORIDE 20 MG/1
20 TABLET, FILM COATED ORAL DAILY
Status: ON HOLD | COMMUNITY
End: 2021-04-27

## 2018-11-24 RX ORDER — ROPINIROLE 0.25 MG/1
0.25 TABLET, FILM COATED ORAL EVERY 8 HOURS SCHEDULED
Status: DISCONTINUED | OUTPATIENT
Start: 2018-11-24 | End: 2018-11-27 | Stop reason: HOSPADM

## 2018-11-24 RX ORDER — SODIUM CHLORIDE 9 MG/ML
100 INJECTION, SOLUTION INTRAVENOUS CONTINUOUS
Status: DISCONTINUED | OUTPATIENT
Start: 2018-11-24 | End: 2018-11-25

## 2018-11-24 RX ORDER — FAMOTIDINE 20 MG/1
40 TABLET, FILM COATED ORAL DAILY
Status: DISCONTINUED | OUTPATIENT
Start: 2018-11-25 | End: 2018-11-27 | Stop reason: HOSPADM

## 2018-11-24 RX ORDER — SODIUM CHLORIDE 0.9 % (FLUSH) 0.9 %
3-10 SYRINGE (ML) INJECTION AS NEEDED
Status: DISCONTINUED | OUTPATIENT
Start: 2018-11-24 | End: 2018-11-27 | Stop reason: HOSPADM

## 2018-11-24 RX ORDER — ROPINIROLE 0.25 MG/1
0.25 TABLET, FILM COATED ORAL 3 TIMES DAILY
Status: ON HOLD | COMMUNITY
End: 2021-04-27

## 2018-11-24 RX ORDER — PAROXETINE HYDROCHLORIDE 20 MG/1
20 TABLET, FILM COATED ORAL DAILY
Status: DISCONTINUED | OUTPATIENT
Start: 2018-11-25 | End: 2018-11-27 | Stop reason: HOSPADM

## 2018-11-24 RX ADMIN — SODIUM CHLORIDE 100 ML/HR: 9 INJECTION, SOLUTION INTRAVENOUS at 23:55

## 2018-11-24 RX ADMIN — SODIUM CHLORIDE, POTASSIUM CHLORIDE, SODIUM LACTATE AND CALCIUM CHLORIDE 1000 ML: 600; 310; 30; 20 INJECTION, SOLUTION INTRAVENOUS at 22:25

## 2018-11-24 RX ADMIN — HEPARIN SODIUM 5000 UNITS: 5000 INJECTION, SOLUTION INTRAVENOUS; SUBCUTANEOUS at 23:54

## 2018-11-24 RX ADMIN — SODIUM CHLORIDE, PRESERVATIVE FREE 3 ML: 5 INJECTION INTRAVENOUS at 22:59

## 2018-11-24 RX ADMIN — CEFTRIAXONE SODIUM 1 G: 1 INJECTION, POWDER, FOR SOLUTION INTRAMUSCULAR; INTRAVENOUS at 21:44

## 2018-11-25 ENCOUNTER — APPOINTMENT (OUTPATIENT)
Dept: GENERAL RADIOLOGY | Facility: HOSPITAL | Age: 73
End: 2018-11-25

## 2018-11-25 ENCOUNTER — APPOINTMENT (OUTPATIENT)
Dept: CARDIOLOGY | Facility: HOSPITAL | Age: 73
End: 2018-11-25
Attending: FAMILY MEDICINE

## 2018-11-25 ENCOUNTER — APPOINTMENT (OUTPATIENT)
Dept: ULTRASOUND IMAGING | Facility: HOSPITAL | Age: 73
End: 2018-11-25

## 2018-11-25 PROBLEM — M62.82 NON-TRAUMATIC RHABDOMYOLYSIS: Status: ACTIVE | Noted: 2018-11-25

## 2018-11-25 LAB
ALBUMIN SERPL-MCNC: 3.2 G/DL (ref 3.5–5)
ALBUMIN/GLOB SERPL: 1.3 G/DL (ref 1.1–2.5)
ALP SERPL-CCNC: 76 U/L (ref 24–120)
ALT SERPL W P-5'-P-CCNC: 65 U/L (ref 0–54)
ANION GAP SERPL CALCULATED.3IONS-SCNC: 9 MMOL/L (ref 4–13)
AST SERPL-CCNC: 208 U/L (ref 7–45)
BACTERIA UR QL AUTO: ABNORMAL /HPF
BASOPHILS # BLD AUTO: 0.02 10*3/MM3 (ref 0–0.2)
BASOPHILS NFR BLD AUTO: 0.1 % (ref 0–2)
BH CV ECHO MEAS - AO MAX PG (FULL): 4.5 MMHG
BH CV ECHO MEAS - AO MAX PG: 11.3 MMHG
BH CV ECHO MEAS - AO MEAN PG (FULL): 2 MMHG
BH CV ECHO MEAS - AO MEAN PG: 6 MMHG
BH CV ECHO MEAS - AO ROOT AREA (BSA CORRECTED): 1.4
BH CV ECHO MEAS - AO ROOT AREA: 4.9 CM^2
BH CV ECHO MEAS - AO ROOT DIAM: 2.5 CM
BH CV ECHO MEAS - AO V2 MAX: 168 CM/SEC
BH CV ECHO MEAS - AO V2 MEAN: 108 CM/SEC
BH CV ECHO MEAS - AO V2 VTI: 29.4 CM
BH CV ECHO MEAS - AVA(I,A): 2.5 CM^2
BH CV ECHO MEAS - AVA(I,D): 2.5 CM^2
BH CV ECHO MEAS - AVA(V,A): 2.2 CM^2
BH CV ECHO MEAS - AVA(V,D): 2.2 CM^2
BH CV ECHO MEAS - BSA(HAYCOCK): 1.8 M^2
BH CV ECHO MEAS - BSA: 1.8 M^2
BH CV ECHO MEAS - BZI_BMI: 24.7 KILOGRAMS/M^2
BH CV ECHO MEAS - BZI_METRIC_HEIGHT: 167.6 CM
BH CV ECHO MEAS - BZI_METRIC_WEIGHT: 69.4 KG
BH CV ECHO MEAS - EDV(CUBED): 91.1 ML
BH CV ECHO MEAS - EDV(MOD-SP4): 105 ML
BH CV ECHO MEAS - EDV(TEICH): 92.4 ML
BH CV ECHO MEAS - EF(CUBED): 78.4 %
BH CV ECHO MEAS - EF(MOD-SP4): 66.1 %
BH CV ECHO MEAS - EF(TEICH): 70.8 %
BH CV ECHO MEAS - ESV(CUBED): 19.7 ML
BH CV ECHO MEAS - ESV(MOD-SP4): 35.6 ML
BH CV ECHO MEAS - ESV(TEICH): 27 ML
BH CV ECHO MEAS - FS: 40 %
BH CV ECHO MEAS - IVS/LVPW: 1.1
BH CV ECHO MEAS - IVSD: 0.9 CM
BH CV ECHO MEAS - LA DIMENSION: 2.7 CM
BH CV ECHO MEAS - LA/AO: 1.1
BH CV ECHO MEAS - LAT PEAK E' VEL: 10.8 CM/SEC
BH CV ECHO MEAS - LV DIASTOLIC VOL/BSA (35-75): 58.8 ML/M^2
BH CV ECHO MEAS - LV MASS(C)D: 123.1 GRAMS
BH CV ECHO MEAS - LV MASS(C)DI: 69 GRAMS/M^2
BH CV ECHO MEAS - LV MAX PG: 6.8 MMHG
BH CV ECHO MEAS - LV MEAN PG: 4 MMHG
BH CV ECHO MEAS - LV SYSTOLIC VOL/BSA (12-30): 19.9 ML/M^2
BH CV ECHO MEAS - LV V1 MAX: 130 CM/SEC
BH CV ECHO MEAS - LV V1 MEAN: 93 CM/SEC
BH CV ECHO MEAS - LV V1 VTI: 26.3 CM
BH CV ECHO MEAS - LVIDD: 4.5 CM
BH CV ECHO MEAS - LVIDS: 2.7 CM
BH CV ECHO MEAS - LVLD AP4: 8.4 CM
BH CV ECHO MEAS - LVLS AP4: 6 CM
BH CV ECHO MEAS - LVOT AREA (M): 2.8 CM^2
BH CV ECHO MEAS - LVOT AREA: 2.8 CM^2
BH CV ECHO MEAS - LVOT DIAM: 1.9 CM
BH CV ECHO MEAS - LVPWD: 0.8 CM
BH CV ECHO MEAS - MED PEAK E' VEL: 7.4 CM/SEC
BH CV ECHO MEAS - MV A MAX VEL: 75.1 CM/SEC
BH CV ECHO MEAS - MV DEC TIME: 0.14 SEC
BH CV ECHO MEAS - MV E MAX VEL: 64.9 CM/SEC
BH CV ECHO MEAS - MV E/A: 0.86
BH CV ECHO MEAS - SI(AO): 80.9 ML/M^2
BH CV ECHO MEAS - SI(CUBED): 40 ML/M^2
BH CV ECHO MEAS - SI(LVOT): 41.8 ML/M^2
BH CV ECHO MEAS - SI(MOD-SP4): 38.9 ML/M^2
BH CV ECHO MEAS - SI(TEICH): 36.7 ML/M^2
BH CV ECHO MEAS - SV(AO): 144.3 ML
BH CV ECHO MEAS - SV(CUBED): 71.4 ML
BH CV ECHO MEAS - SV(LVOT): 74.6 ML
BH CV ECHO MEAS - SV(MOD-SP4): 69.4 ML
BH CV ECHO MEAS - SV(TEICH): 65.4 ML
BH CV ECHO MEASUREMENTS AVERAGE E/E' RATIO: 7.13
BILIRUB SERPL-MCNC: 1.3 MG/DL (ref 0.1–1)
BILIRUB UR QL STRIP: NEGATIVE
BUN BLD-MCNC: 33 MG/DL (ref 5–21)
BUN/CREAT SERPL: 38.8 (ref 7–25)
CA-I BLD-MCNC: 4.24 MG/DL (ref 4.6–5.4)
CALCIUM SPEC-SCNC: 8.5 MG/DL (ref 8.4–10.4)
CHLORIDE SERPL-SCNC: 111 MMOL/L (ref 98–110)
CK SERPL-CCNC: 9522 U/L (ref 0–203)
CLARITY UR: CLEAR
CO2 SERPL-SCNC: 26 MMOL/L (ref 24–31)
COLOR UR: ABNORMAL
CREAT BLD-MCNC: 0.85 MG/DL (ref 0.5–1.4)
DEPRECATED RDW RBC AUTO: 45.3 FL (ref 40–54)
EOSINOPHIL # BLD AUTO: 0 10*3/MM3 (ref 0–0.7)
EOSINOPHIL NFR BLD AUTO: 0 % (ref 0–4)
ERYTHROCYTE [DISTWIDTH] IN BLOOD BY AUTOMATED COUNT: 13.2 % (ref 12–15)
GFR SERPL CREATININE-BSD FRML MDRD: 65 ML/MIN/1.73
GLOBULIN UR ELPH-MCNC: 2.5 GM/DL
GLUCOSE BLD-MCNC: 111 MG/DL (ref 70–100)
GLUCOSE UR STRIP-MCNC: NEGATIVE MG/DL
HAV IGM SERPL QL IA: NEGATIVE
HBV CORE IGM SERPL QL IA: NEGATIVE
HBV SURFACE AG SERPL QL IA: NEGATIVE
HCT VFR BLD AUTO: 39.3 % (ref 37–47)
HCV AB SER DONR QL: NEGATIVE
HCV S/C RATIO: 0.04 (ref 0–0.99)
HGB BLD-MCNC: 13.1 G/DL (ref 12–16)
HGB UR QL STRIP.AUTO: ABNORMAL
HYALINE CASTS UR QL AUTO: ABNORMAL /LPF
KETONES UR QL STRIP: ABNORMAL
LEFT ATRIUM VOLUME INDEX: 24.2 ML/M2
LEFT ATRIUM VOLUME: 43.1 CM3
LEUKOCYTE ESTERASE UR QL STRIP.AUTO: ABNORMAL
LV EF 2D ECHO EST: 60 %
LYMPHOCYTES # BLD AUTO: 1.27 10*3/MM3 (ref 0.72–4.86)
LYMPHOCYTES NFR BLD AUTO: 8.7 % (ref 15–45)
Lab: ABNORMAL
MAGNESIUM SERPL-MCNC: 2.3 MG/DL (ref 1.4–2.2)
MAXIMAL PREDICTED HEART RATE: 145 BPM
MCH RBC QN AUTO: 31.3 PG (ref 28–32)
MCHC RBC AUTO-ENTMCNC: 33.3 G/DL (ref 33–36)
MCV RBC AUTO: 93.8 FL (ref 82–98)
MONOCYTES # BLD AUTO: 1.74 10*3/MM3 (ref 0.19–1.3)
MONOCYTES NFR BLD AUTO: 11.9 % (ref 4–12)
MYOGLOBIN SERPL-MCNC: 2116 NG/ML (ref 0–110)
NEUTROPHILS # BLD AUTO: 11.5 10*3/MM3 (ref 1.87–8.4)
NEUTROPHILS NFR BLD AUTO: 78.6 % (ref 39–78)
NITRITE UR QL STRIP: NEGATIVE
OSMOLALITY SERPL: 315 MOSM/KG (ref 289–308)
PH UR STRIP.AUTO: 5.5 [PH] (ref 5–8)
PHOSPHATE SERPL-MCNC: 3.6 MG/DL (ref 2.5–4.5)
PLATELET # BLD AUTO: 113 10*3/MM3 (ref 130–400)
PMV BLD AUTO: 10 FL (ref 6–12)
POTASSIUM BLD-SCNC: 3.6 MMOL/L (ref 3.5–5.3)
PROT SERPL-MCNC: 5.7 G/DL (ref 6.3–8.7)
PROT UR QL STRIP: ABNORMAL
RBC # BLD AUTO: 4.19 10*6/MM3 (ref 4.2–5.4)
RBC # UR: ABNORMAL /HPF
REF LAB TEST METHOD: ABNORMAL
SODIUM BLD-SCNC: 146 MMOL/L (ref 135–145)
SP GR UR STRIP: 1.02 (ref 1–1.03)
SQUAMOUS #/AREA URNS HPF: ABNORMAL /HPF
STRESS TARGET HR: 123 BPM
TROPONIN I SERPL-MCNC: 0.14 NG/ML (ref 0–0.03)
URINE MYOGLOBIN, QUALITATIVE: POSITIVE
UROBILINOGEN UR QL STRIP: ABNORMAL
WBC NRBC COR # BLD: 14.63 10*3/MM3 (ref 4.8–10.8)
WBC UR QL AUTO: ABNORMAL /HPF

## 2018-11-25 PROCEDURE — 80053 COMPREHEN METABOLIC PANEL: CPT | Performed by: FAMILY MEDICINE

## 2018-11-25 PROCEDURE — 85025 COMPLETE CBC W/AUTO DIFF WBC: CPT | Performed by: FAMILY MEDICINE

## 2018-11-25 PROCEDURE — 87086 URINE CULTURE/COLONY COUNT: CPT | Performed by: NURSE PRACTITIONER

## 2018-11-25 PROCEDURE — 82330 ASSAY OF CALCIUM: CPT

## 2018-11-25 PROCEDURE — 93306 TTE W/DOPPLER COMPLETE: CPT

## 2018-11-25 PROCEDURE — 93010 ELECTROCARDIOGRAM REPORT: CPT | Performed by: INTERNAL MEDICINE

## 2018-11-25 PROCEDURE — 93005 ELECTROCARDIOGRAM TRACING: CPT | Performed by: FAMILY MEDICINE

## 2018-11-25 PROCEDURE — 25010000002 PERFLUTREN 6.52 MG/ML SUSPENSION: Performed by: FAMILY MEDICINE

## 2018-11-25 PROCEDURE — 99223 1ST HOSP IP/OBS HIGH 75: CPT | Performed by: PSYCHIATRY & NEUROLOGY

## 2018-11-25 PROCEDURE — 83735 ASSAY OF MAGNESIUM: CPT | Performed by: FAMILY MEDICINE

## 2018-11-25 PROCEDURE — G8997 SWALLOW GOAL STATUS: HCPCS | Performed by: SPEECH-LANGUAGE PATHOLOGIST

## 2018-11-25 PROCEDURE — 92610 EVALUATE SWALLOWING FUNCTION: CPT | Performed by: SPEECH-LANGUAGE PATHOLOGIST

## 2018-11-25 PROCEDURE — 83874 ASSAY OF MYOGLOBIN: CPT | Performed by: NURSE PRACTITIONER

## 2018-11-25 PROCEDURE — 25010000002 CEFTRIAXONE PER 250 MG: Performed by: FAMILY MEDICINE

## 2018-11-25 PROCEDURE — 83874 ASSAY OF MYOGLOBIN: CPT | Performed by: FAMILY MEDICINE

## 2018-11-25 PROCEDURE — 93306 TTE W/DOPPLER COMPLETE: CPT | Performed by: INTERNAL MEDICINE

## 2018-11-25 PROCEDURE — 82550 ASSAY OF CK (CPK): CPT | Performed by: FAMILY MEDICINE

## 2018-11-25 PROCEDURE — 25010000002 HEPARIN (PORCINE) PER 1000 UNITS: Performed by: FAMILY MEDICINE

## 2018-11-25 PROCEDURE — 84100 ASSAY OF PHOSPHORUS: CPT | Performed by: FAMILY MEDICINE

## 2018-11-25 PROCEDURE — 94799 UNLISTED PULMONARY SVC/PX: CPT

## 2018-11-25 PROCEDURE — 84484 ASSAY OF TROPONIN QUANT: CPT | Performed by: FAMILY MEDICINE

## 2018-11-25 PROCEDURE — G8996 SWALLOW CURRENT STATUS: HCPCS | Performed by: SPEECH-LANGUAGE PATHOLOGIST

## 2018-11-25 PROCEDURE — 94760 N-INVAS EAR/PLS OXIMETRY 1: CPT

## 2018-11-25 PROCEDURE — 81001 URINALYSIS AUTO W/SCOPE: CPT | Performed by: NURSE PRACTITIONER

## 2018-11-25 RX ORDER — HEPARIN SODIUM 5000 [USP'U]/ML
5000 INJECTION, SOLUTION INTRAVENOUS; SUBCUTANEOUS EVERY 12 HOURS SCHEDULED
Status: DISCONTINUED | OUTPATIENT
Start: 2018-11-26 | End: 2018-11-27 | Stop reason: HOSPADM

## 2018-11-25 RX ORDER — AMANTADINE HYDROCHLORIDE 100 MG/1
100 TABLET ORAL EVERY 12 HOURS SCHEDULED
Status: DISCONTINUED | OUTPATIENT
Start: 2018-11-25 | End: 2018-11-27 | Stop reason: HOSPADM

## 2018-11-25 RX ORDER — CARBIDOPA AND LEVODOPA 50; 200 MG/1; MG/1
1 TABLET, EXTENDED RELEASE ORAL
Status: ON HOLD | COMMUNITY
End: 2021-04-28

## 2018-11-25 RX ORDER — SODIUM CHLORIDE, SODIUM LACTATE, POTASSIUM CHLORIDE, CALCIUM CHLORIDE 600; 310; 30; 20 MG/100ML; MG/100ML; MG/100ML; MG/100ML
125 INJECTION, SOLUTION INTRAVENOUS CONTINUOUS
Status: DISCONTINUED | OUTPATIENT
Start: 2018-11-25 | End: 2018-11-27 | Stop reason: HOSPADM

## 2018-11-25 RX ADMIN — CEFTRIAXONE SODIUM 1 G: 1 INJECTION, POWDER, FOR SOLUTION INTRAMUSCULAR; INTRAVENOUS at 21:13

## 2018-11-25 RX ADMIN — ROPINIROLE HYDROCHLORIDE 0.25 MG: 0.25 TABLET, FILM COATED ORAL at 13:51

## 2018-11-25 RX ADMIN — CARBIDOPA AND LEVODOPA 2 TABLET: 25; 100 TABLET ORAL at 21:13

## 2018-11-25 RX ADMIN — ROPINIROLE HYDROCHLORIDE 0.25 MG: 0.25 TABLET, FILM COATED ORAL at 21:13

## 2018-11-25 RX ADMIN — CARBIDOPA AND LEVODOPA 2 TABLET: 25; 100 TABLET ORAL at 13:51

## 2018-11-25 RX ADMIN — SODIUM CHLORIDE, POTASSIUM CHLORIDE, SODIUM LACTATE AND CALCIUM CHLORIDE 125 ML/HR: 600; 310; 30; 20 INJECTION, SOLUTION INTRAVENOUS at 11:18

## 2018-11-25 RX ADMIN — HEPARIN SODIUM 5000 UNITS: 5000 INJECTION, SOLUTION INTRAVENOUS; SUBCUTANEOUS at 09:08

## 2018-11-25 RX ADMIN — PERFLUTREN 8.48 MG: 6.52 INJECTION, SUSPENSION INTRAVENOUS at 10:46

## 2018-11-25 RX ADMIN — FAMOTIDINE 40 MG: 20 TABLET, FILM COATED ORAL at 09:08

## 2018-11-25 RX ADMIN — PAROXETINE HYDROCHLORIDE 20 MG: 20 TABLET, FILM COATED ORAL at 09:08

## 2018-11-25 RX ADMIN — AMANTADINE HYDROCHLORIDE 100 MG: 100 TABLET ORAL at 21:13

## 2018-11-25 RX ADMIN — SODIUM CHLORIDE, PRESERVATIVE FREE 3 ML: 5 INJECTION INTRAVENOUS at 09:08

## 2018-11-25 NOTE — PROGRESS NOTES
Discharge Planning Assessment  [facility]     Patient Name: Purnima Sutherland  MRN: [id number]  Today's Date: 11/25/2018    Admit Date: 11/24/2018    Discharge Needs Assessment     Row Name 11/25/18 0826       Living Environment    Lives With  alone    Current Living Arrangements  home/apartment/condo    Primary Care Provided by  self    Provides Primary Care For  no one    Family Caregiver if Needed  child(yris), adult    Quality of Family Relationships  helpful;involved;supportive    Able to Return to Prior Arrangements  yes       Resource/Environmental Concerns    Resource/Environmental Concerns  none       Transition Planning    Patient/Family Anticipates Transition to  home;long term care facility    Patient/Family Anticipated Services at Transition  none    Transportation Anticipated  family or friend will provide       Discharge Needs Assessment    Readmission Within the Last 30 Days  no previous admission in last 30 days    Concerns to be Addressed  no discharge needs identified    Equipment Currently Used at Home  cane, quad    Anticipated Changes Related to Illness  none    Equipment Needed After Discharge  none    Current Discharge Risk  lives alone    Discharge Coordination/Progress  Pt has PCP and RX coverage.  Pt can afford medications.  hh vs snf.  SW will follow.        Discharge Plan    No documentation.       Destination      No service coordination in this encounter.      Durable Medical Equipment      No service coordination in this encounter.      Dialysis/Infusion      No service coordination in this encounter.      Home Medical Care      No service coordination in this encounter.      Community Resources      No service coordination in this encounter.          Demographic Summary    No documentation.       Functional Status    No documentation.       Psychosocial    No documentation.       Abuse/Neglect    No documentation.       Legal    No documentation.       Substance Abuse    No  documentation.       Patient Forms    No documentation.           KAELYN AndreW

## 2018-11-25 NOTE — PLAN OF CARE
Problem: Patient Care Overview  Goal: Plan of Care Review  Outcome: Ongoing (interventions implemented as appropriate)   11/25/18 0303   Coping/Psychosocial   Plan of Care Reviewed With patient   Plan of Care Review   Progress no change   OTHER   Outcome Summary pt has been confused to place, situation, and at times, time, speech is slurred although her sons say this is normal r/t her hx with Parkinsons, VSS, pt pleasant, NPO after failed swallow screen. No additional neuro decline noted, safety maintained.       Problem: Fall Risk (Adult)  Goal: Identify Related Risk Factors and Signs and Symptoms  Outcome: Outcome(s) achieved Date Met: 11/25/18    Goal: Absence of Fall  Outcome: Ongoing (interventions implemented as appropriate)      Problem: Confusion, Acute (Adult)  Goal: Identify Related Risk Factors and Signs and Symptoms  Outcome: Outcome(s) achieved Date Met: 11/25/18    Goal: Cognitive/Functional Impairments Minimized  Outcome: Ongoing (interventions implemented as appropriate)    Goal: Safety  Outcome: Ongoing (interventions implemented as appropriate)

## 2018-11-25 NOTE — PROGRESS NOTES
[facility]  INPATIENT PROGRESS NOTE    Length of Stay: 1  Date of Admission: 11/24/2018  Primary Care Physician: Beny Garner MD    Subjective   Chief Complaint: confusion at home, suspected fall  HPI   Presented to ER 11/24/18 after family member found her in the floor in the closet at home around 8 PM.  Son reports that she was in her usual state on 11/23 around 8 PM when he took her some food.  However, when they checked on her the following day she was found in the closet and was unsure how she had gotten there.  Son reports that she was in a twisted position.  Patient reports she thinks she fell and laid in the floor.  Patient has a history of Parkinson's and son reports that she was recently started on amantadane 2 weeks ago by Dr. Ritchie.  Since starting new medication son reports she has had intermittent hallucinations, she was seeing bugs, and thought she was in a recent car accident.  She complained of right elbow and right hip pain.  All x-rays negative in the emergency room.    CK 7203 on admission, 9522 today.  Myoglobin 2116.  Urinalysis 1+ bacteria, negative nitrite    Lying in bed.  Son, Tyler in room.  She denies her 7 correctly dented 5 family members.  She no she is in the hospital.  She is alert to date.  She is able to tell me her neurologist and her primary care provider.  She is unsure of the events over the last 24 hours.  Son reports she is normally alert and lives independently.  She denies nausea, vomiting or abdominal pain.  She denies chest pain, palpitations or shortness of breath.  She reports tenderness right arm.    Review of Systems   Constitutional: Positive for activity change. Negative for fever.   HENT: Negative for congestion and trouble swallowing.    Eyes: Negative for photophobia and visual disturbance.   Respiratory: Negative for cough, shortness of breath and wheezing.    Cardiovascular: Negative for chest pain,  palpitations and leg swelling.   Gastrointestinal: Negative for constipation, diarrhea, nausea and vomiting.   Endocrine: Negative for cold intolerance, heat intolerance and polyuria.   Genitourinary: Negative for dysuria and urgency.   Musculoskeletal: Positive for gait problem.   Skin: Negative for wound.   Allergic/Immunologic: Negative for immunocompromised state.   Neurological: Positive for tremors (Chronic upper extremities and lower extremity secondary to Parkinson's) and weakness.   Hematological: Negative for adenopathy. Does not bruise/bleed easily.   Psychiatric/Behavioral: Positive for confusion (Intermittent,). Negative for agitation and behavioral problems.      All pertinent negatives and positives are as above. All other systems have been reviewed and are negative unless otherwise stated.     Objective    Temp:  [97.7 °F (36.5 °C)-98.3 °F (36.8 °C)] 97.9 °F (36.6 °C)  Heart Rate:  [82-95] 82  Resp:  [18] 18  BP: (127-163)/(65-86) 127/65  Physical Exam   Constitutional: She is oriented to person, place, and time. She appears well-developed.   Ill appearing   HENT:   Head: Normocephalic and atraumatic.   Eyes: EOM are normal. Pupils are equal, round, and reactive to light.   Neck: Normal range of motion. Neck supple.   Cardiovascular: Normal rate, regular rhythm, normal heart sounds and intact distal pulses. Exam reveals no gallop and no friction rub.   No murmur heard.  Normal sinus rhythm 88-93 on telemetry   Pulmonary/Chest: Effort normal and breath sounds normal. No respiratory distress. She has no wheezes. She has no rales.   No oxygen in place.   Abdominal: Soft. Bowel sounds are normal. She exhibits no distension. There is no tenderness.   Musculoskeletal: She exhibits tenderness (Right hip, right forearm). She exhibits no edema.   Neurological: She is oriented to person, place, and time.   Answers most questions appropriately.  Identifies herself and family members.  Able to tell me the name  of her primary care provider and Dr. Ritchie is her neurologist.  Identifies year. Knows she is in the hospital. Unable to recall events of past 24 hours. Unsure why she is in hospital.   Skin:   Bruising left lower extremity.  Abrasion right hip, abrasion right elbow     Results Review:  I have reviewed the labs, radiology results, and diagnostic studies.    Laboratory Data:   Results from last 7 days   Lab Units  11/25/18   0609  11/24/18 1949   WBC 10*3/mm3  14.63*  16.40*   HEMOGLOBIN g/dL  13.1  15.3   HEMATOCRIT %  39.3  44.8   PLATELETS 10*3/mm3  113*  253        Results from last 7 days   Lab Units  11/25/18   0609  11/24/18 1949   SODIUM mmol/L  146*  146*   POTASSIUM mmol/L  3.6  3.9   CHLORIDE mmol/L  111*  104   CO2 mmol/L  26.0  25.0   BUN mg/dL  33*  32*   CREATININE mg/dL  0.85  1.18   CALCIUM mg/dL  8.5  10.1   BILIRUBIN mg/dL  1.3*  1.7*   ALK PHOS U/L  76  107   ALT (SGPT) U/L  65*  36   AST (SGOT) U/L  208*  159*   GLUCOSE mg/dL  111*  163*     Imaging Results (all)     Procedure Component Value Units Date/Time    XR Hip With or Without Pelvis 2 - 3 View Right [[id number]] Collected:  11/24/18 2116     Updated:  11/24/18 2119    Narrative:       EXAMINATION: XR HIP W OR WO PELVIS 2-3 VIEW RIGHT-     11/24/2018 8:44 PM CST     HISTORY: Fall injury. Pelvis and right hip, 3 views.     The pelvic ring is intact.   No pubic symphysis or sacroiliac joint diastasis.     No discrete soft tissue abnormality is seen.  No hip fracture is seen.  Mild right femoral head and acetabular spurring     Summary :  1. No acute bony abnormality is seen.  This report was finalized on 11/24/2018 21:16 by Dr. Shivam Melgar MD.    XR Elbow 2 View Right [[id number]] Collected:  11/24/18 2115     Updated:  11/24/18 2119    Narrative:       EXAMINATION: XR ELBOW 2 VW RIGHT-     11/24/2018 8:44 PM CST     HISTORY: RIGHT ELBOW ABRASION, SWELLING AND PAIN.  Fall injury.     Right elbow, 2 views.     The distal humerus and  proximal radius and ulna have a normal  appearance.   The soft tissues are appropriate.  There is a normal appearance of the elbow joint.  No significant joint effusion is seen.       Impression:       1. No acute bony abnormality is seen.  This report was finalized on 11/24/2018 21:15 by Dr. Shivam Melgar MD.    XR Humerus Right [[id number]] Collected:  11/24/18 2115     Updated:  11/24/18 2118    Narrative:       EXAMINATION: XR HUMERUS RIGHT-     11/24/2018 8:44 PM CST     HISTORY: RIGHT ARM PAIN AND SWELLING.  Fall injury.     Right humerus, 2 views.     No humerus fracture is seen.     The shoulder and elbow are normal, to the extent visualized.   No discrete soft tissue abnormality is seen.     Summary:  1. No acute bony abnormality.     This report was finalized on 11/24/2018 21:15 by Dr. Shivam Melgar MD.    XR Chest 1 View [[id number]] Collected:  11/24/18 2114     Updated:  11/24/18 2118    Narrative:       EXAMINATION: XR CHEST 1 VW-     11/24/2018 8:43 PM CST     HISTORY: Fall injury. Blunt trauma.     One view chest x-ray compared with 5/26/2017.     Heart size is normal.  The mediastinum is within normal limits.      The lungs are normally expanded with no pneumonia or pneumothorax.       No congestive failure changes.                                                                       Impression:       1. No acute disease.        This report was finalized on 11/24/2018 21:14 by Dr. Shivam Melgar MD.    CT Cervical Spine Without Contrast [[id number]] Collected:  11/24/18 2027     Updated:  11/24/18 2031    Narrative:       EXAMINATION: CT CERVICAL SPINE WO CONTRAST-      11/24/2018 8:03 PM CST     HISTORY: C-spine trauma, NEXUS/CCR negative, low risk.  Fall injury. Head and neck trauma.     In order to have a CT radiation dose as low as reasonably achievable  Automated Exposure Control was utilized for adjustment of the mA and/or  KV according to patient size.     DLP in mGycm= 309.     Axial, sagittal,  and coronal noncontrast CT imaging.     Vertebral bodies and posterior elements are intact.     Reconstructed sagittal images show normal curvature.   There is no malalignment. Prevertebral soft tissues are normal.   Facet joints align normally.     Moderate degenerative disc, endplate, and facet joint degenerative  change.     Reconstructed coronal images show normal lateral mass alignment.       Impression:       1. No acute fracture.     This report was finalized on 11/24/2018 20:28 by Dr. Shivam Melgar MD.    CT Head Without Contrast [[id number]] Collected:  11/24/18 2026     Updated:  11/24/18 2030    Narrative:       EXAMINATION: CT HEAD WO CONTRAST-      11/24/2018 8:03 PM CST     HISTORY: Head trauma, ataxia     In order to have a CT radiation dose as low as reasonably achievable  Automated Exposure Control was utilized for adjustment of the mA and/or  KV according to patient size.     DLP in mGycm= noncontrast head CT compared with 9/6/2018.     Axial, sagittal, and coronal noncontrast CT imaging of the head.     The visualized paranasal sinuses are clear.     The brain and ventricles have an age appropriate appearance.   There is no hemorrhage or mass-effect.   No acute infarction is seen.     No calvarial abnormality.       Impression:       1. No acute intracranial abnormality is seen.     This report was finalized on 11/24/2018 20:27 by Dr. Shivam Melgar MD.            Intake/Output    Intake/Output Summary (Last 24 hours) at 11/25/2018 1213  Last data filed at 11/25/2018 1118  Gross per 24 hour   Intake 1001 ml   Output 450 ml   Net 551 ml       Scheduled Meds    carbidopa-levodopa 2 tablet Oral Q8H   ceftriaxone 1 g Intravenous Q24H   famotidine 40 mg Oral Daily   heparin (porcine) 5,000 Units Subcutaneous Q12H   levothyroxine 75 mcg Oral Q AM   PARoxetine 20 mg Oral Daily   rOPINIRole 0.25 mg Oral Q8H   sodium chloride 3 mL Intravenous Q12H       I have reviewed the patient current medications.      Assessment/Plan     Assessment:  1.  Acute nontraumatic rhabdomyolysis secondary to suspected fall, CPK 9522, myoglobin 2116  2.  Suspected fall  3.  Abnormal urinalysis, doubt acute cystitis.  1+ bacteria, negative nitrates, WBC 0-2, suspect secondary to fall with rhabdomyolysis  4.  Mental status changes, improved, etiology undetermined  5.  Leukocytosis suspect secondary to fall and rhabdomyolysis  6.  Mild hypernatremia secondary to volume depletion and #1  7.  Elevated LFTs  8.  Parkinson's disease  9.  Hypothyroidism    Plan:  1.  Received lactated Ringer's 1 L in ER, Rocephin IV.  2.  IV fluid hydration lactated Ringer's at 125 mL per hour.  3.  CT cervical spine negative, CT head negative, x-ray right elbow no abnormality noted,  Right hip x-ray no abnormality, right humerus x-ray no abnormality.  4.  Echocardiogram pending  5.  Noninvasive carotids pending, ultrasound abdomen pending  6.  Place Juarez catheter to monitor urine output  7.  CBC, comprehensive metabolic panel, CPK, myoglobin tomorrow  8.  Neurology consulted.  Family members report intermittent hallucinations after new medication amantadine started 2 weeks ago.  9.  Physical therapy consult  10.  Blood culture in progress.  Will likely be able to discontinue antibiotics.  Doubt urinary tract infection  11.  SCDs for deep vein cirrhosis prophylaxis.  12.  Home medications reviewed.  Appropriate medications resumed.  13.  Social service consult    Her son Ender is her surrogate decision maker  The above documentation resulted from a face-to-face encounter by me Yady PACK, [facility].      Discharge Planning: I expect patient to be discharged to home with home health or skilled nursing facility in 2-3 days.    SIRENA Klein   11/25/18   12:13 PM      Chart reviewed  Patient examined  Agree with assessment and plan  Discussed with patient and family rationale for current treatment, questions answered.  Discussed with KAMARI Collins  SIRENA Mancilla DO  11/25/18  3:44 PM

## 2018-11-25 NOTE — CONSULTS
Neurology Consult Note    Referring Provider: Dr. Mao Mancilla  Reason for Consultation: hallucinations      History of present illness:      This is a 73-year-old female who presents with hallucinations.  She is a long-standing patient of Dr. Mccain.  She has Parkinson's disease and was diagnosed with this over 5 years ago.  Her son is in the room and assist in the history somewhat.  I was also able to review records from her neurologist.  Reportedly she is on high-dose Sinemet therapy in addition to Requip.  Amantadine was added at some point recently.  There is records from the end of September when Dr. Mccain recommended she start this.  The patient currently believes that she started approximately one month ago.  Her son at the bedside believe she started this around 2 weeks ago.  Records from Dr. Ritchie does document a phone call on November 21 suggesting that she was having hallucinations.  At that time the family was concerned that it was the amantadine.  The recommendation was that these symptoms were temporary and would pass.  The patient was last seen 2 days ago by her son acting normally.  Yesterday he went to check on her and she was found in a closet.  She pulled close down on top of her.  She was somewhat contorted based on his description.  She was hallucinating saying the tubing were after her.  This was similar to a previous episode where she saw bugs on the floor earlier that week.  She was admitted overnight and has returned back to her baseline status this morning.    Below is an exert from Dr. Ritchie's last note:  Purnima Sutherland is a 73 y.o. year old female who is seen for evaluation of Parkinson's disease with occasional palpitations and dyskinesias. Currently taking regular Sinemet 25/100 , 2 pills 3 times daily and CR 50/200 three times a day ( staggers regular with CR ) along with Requip 0.25 mg 3 times a day. .restless leg symptoms and muscle cramps are doing better. She has been  using her Apokyne injections about 3-4 times a day on average. She still gets nauseated with that unfortunately. She does feel that it is beneficial overall. She is also taking Xadago. It definitely helps. She has had a fall since her last visit. She is displaying prominent dyskinesias today. Old records are reviewed in detail. We had a long talk regarding all of the above.  Purnima Sutherland is a 73 y.o. female with the following history as recorded in [facility]:  There are no active problems to display for this patient.          Past Medical History:   Diagnosis Date   • Depression    • Disease of thyroid gland    • Parkinson disease (CMS/[facility])        Allergies   Allergen Reactions   • Iodinated Diagnostic Agents    • Sulfa Antibiotics      No current facility-administered medications on file prior to encounter.      Current Outpatient Medications on File Prior to Encounter   Medication Sig   • Amantadine HCl ER (GOCOVRI) 137 MG capsule sustained-release 24 hr Take 2 capsules by mouth Every Night. Started 2-3 weeks ago family thinks causing all the problems   • carbidopa-levodopa (SINEMET)  MG per tablet Take 2 tablets by mouth 3 (Three) Times a Day.   • levothyroxine (SYNTHROID, LEVOTHROID) 75 MCG tablet Take 75 mcg by mouth Daily.   • PARoxetine (PAXIL) 20 MG tablet Take 20 mg by mouth Daily.   • rOPINIRole (REQUIP) 0.25 MG tablet Take 0.25 mg by mouth 3 (Three) Times a Day. Take 1 hour before bedtime.   • Safinamide Mesylate 50 MG tablet Take 50 mg by mouth 2 (Two) Times a Day.   • [DISCONTINUED] carbidopa-levodopa (SINEMET)  MG per tablet Take 2 tablet by mouth 3 times daily       Social History     Socioeconomic History   • Marital status:      Spouse name: Not on file   • Number of children: Not on file   • Years of education: Not on file   • Highest education level: Not on file   Social Needs   • Financial resource strain: Not on file   • Food insecurity - worry: Not on file   • Food  insecurity - inability: Not on file   • Transportation needs - medical: Not on file   • Transportation needs - non-medical: Not on file   Occupational History   • Not on file   Tobacco Use   • Smoking status: Never Smoker   Substance and Sexual Activity   • Alcohol use: No   • Drug use: No   • Sexual activity: Defer   Other Topics Concern   • Not on file   Social History Narrative   • Not on file     History reviewed. No pertinent family history.    Review of Systems  A 14 point review of systems was reviewed and was negative except for confusion    Vital Signs   Temp:  [97.7 °F (36.5 °C)-98.3 °F (36.8 °C)] 97.9 °F (36.6 °C)  Heart Rate:  [82-95] 82  Resp:  [18] 18  BP: (127-163)/(65-86) 127/65    General Exam:  Head:  Normal cephalic, atraumatic  HEENT:  Neck supple  Fundoscopic Exam:  No signs of disc edema  CVS:  Regular rate and rhythm.  No murmurs  Carotid Examination:  No bruits  Lungs:  Clear to auscultation  Abdomen:  Non-tender, Non-distended  Extremities:  No signs of peripheral edema  Skin:  No rashes    Neurologic Exam:    Mental Status:    -Awake, Alert, Oriented X 3  -No word finding difficulties  -No aphasia  -No dysarthria  -Follows simple and complex commands    CN II:  Visual fields full.  Pupils equally reactive to light  CN III, IV, VI:  Extraocular Muscles full with no signs of nystagmus  CN V:  Facial sensory is symmetric with no asymmetries.  CN VII:  Facial motor symmetric  CN VIII:  Gross hearing intact bilaterally  CN IX:  Palate elevates symmetrically  CN X:  Palate elevates symmetrically  CN XI:  Shoulder shrug symmetric  CN XII:  Tongue is midline on protrusion    Motor: (strength out of 5:  1= minimal movement, 2 = movement in plane of gravity, 3 = movement against gravity, 4 = movement against some resistance, 5 = full strength)    -Right Upper Ext: Proximal: 5 Distal: 5  -Left Upper Ext: Proximal: 5 Distal: 5    -Right Lower Ext: Proximal: 5 Distal: 5  -Left Lower Ext: Proximal:  5 Distal: 5    Tone examination shows cogwheel rigidity throughout.    DTR:  -Right   Bicep: 2+ Tricep: 2+ Brachoradialis: 2+   Patella: 2+ Ankle: 2+ Neg Babinski  -Left   Bicep: 2+ Tricep: 2+ Brachoradialis: 2+   Patella: 2+ Ankle: 2+ Neg Babinski    Sensory:  -Intact to light touch, pinprick, temperature, pain, and proprioception    Coordination:  -The patient has significant resting tremors in all 4 extremities.        Results Review:  Lab Results (last 24 hours)     Procedure Component Value Units Date/Time    Blood Culture - Blood, Arm, Left [[id number]] Collected:  11/24/18 2358    Specimen:  Blood from Arm, Left Updated:  11/25/18 1245     Blood Culture No growth at less than 24 hours    Blood Culture - Blood, Arm, Right [[id number]] Collected:  11/24/18 2358    Specimen:  Blood from Arm, Right Updated:  11/25/18 1245     Blood Culture No growth at less than 24 hours    Myoglobin, Serum [[id number]]  (Abnormal) Collected:  11/25/18 0609    Specimen:  Blood Updated:  11/25/18 0740     Myoglobin 2,116.0 ng/mL     CK [[id number]]  (Abnormal) Collected:  11/25/18 0609    Specimen:  Blood Updated:  11/25/18 0732     Creatine Kinase 9,522 U/L     Calcium, Ionized [[id number]]  (Abnormal) Collected:  11/25/18 0610    Specimen:  Blood Updated:  11/25/18 0711     Ionized Calcium 4.24 mg/dL      Comment: 84 Value below reference range        Collected by [id number]     Comment: Meter: [id number]     :  [id number]       Troponin [[id number]]  (Abnormal) Collected:  11/25/18 0609    Specimen:  Blood Updated:  11/25/18 0659     Troponin I 0.138 ng/mL     CBC Auto Differential [[id number]]  (Abnormal) Collected:  11/25/18 0609    Specimen:  Blood Updated:  11/25/18 0650     WBC 14.63 10*3/mm3      RBC 4.19 10*6/mm3      Hemoglobin 13.1 g/dL      Hematocrit 39.3 %      MCV 93.8 fL      MCH 31.3 pg      MCHC 33.3 g/dL      RDW 13.2 %      RDW-SD 45.3 fl      MPV 10.0 fL      Platelets 113 10*3/mm3      Neutrophil % 78.6 %       Lymphocyte % 8.7 %      Monocyte % 11.9 %      Eosinophil % 0.0 %      Basophil % 0.1 %      Neutrophils, Absolute 11.50 10*3/mm3      Lymphocytes, Absolute 1.27 10*3/mm3      Monocytes, Absolute 1.74 10*3/mm3      Eosinophils, Absolute 0.00 10*3/mm3      Basophils, Absolute 0.02 10*3/mm3     Comprehensive Metabolic Panel [[id number]]  (Abnormal) Collected:  11/25/18 0609    Specimen:  Blood Updated:  11/25/18 0648     Glucose 111 mg/dL      BUN 33 mg/dL      Creatinine 0.85 mg/dL      Sodium 146 mmol/L      Potassium 3.6 mmol/L      Chloride 111 mmol/L      CO2 26.0 mmol/L      Calcium 8.5 mg/dL      Total Protein 5.7 g/dL      Albumin 3.20 g/dL      ALT (SGPT) 65 U/L      AST (SGOT) 208 U/L      Alkaline Phosphatase 76 U/L      Total Bilirubin 1.3 mg/dL      eGFR Non African Amer 65 mL/min/1.73      Globulin 2.5 gm/dL      A/G Ratio 1.3 g/dL      BUN/Creatinine Ratio 38.8     Anion Gap 9.0 mmol/L     Narrative:       The MDRD GFR formula is only valid for adults with stable renal function between ages 18 and 70.    Magnesium [[id number]]  (Abnormal) Collected:  11/25/18 0609    Specimen:  Blood Updated:  11/25/18 0648     Magnesium 2.3 mg/dL     Phosphorus [[id number]]  (Normal) Collected:  11/25/18 0609    Specimen:  Blood Updated:  11/25/18 0648     Phosphorus 3.6 mg/dL     Osmolality, Serum [[id number]]  (Abnormal) Collected:  11/24/18 2358    Specimen:  Blood Updated:  11/25/18 0149     Osmolality 315 mOsm/kg     Hepatitis Panel, Acute [[id number]]  (Normal) Collected:  11/24/18 2358    Specimen:  Blood Updated:  11/25/18 0137     HCV S/C Ratio 0.04     Hepatitis C Ab Negative     Hep A IgM Negative     Hep B C IgM Negative     Hepatitis B Surface Ag Negative    Sodium, Urine, Random - Urine, Clean Catch [[id number]]  (Normal) Collected:  11/24/18 2300    Specimen:  Urine, Clean Catch Updated:  11/24/18 2353     Sodium, Urine 34 mmol/L     Osmolality, Urine - Urine, Clean Catch [[id number]]  (Abnormal) Collected:   11/24/18 2300    Specimen:  Urine, Clean Catch Updated:  11/24/18 2343     Osmolality, Urine 467 mOsm/kg     CK [[id number]]  (Abnormal) Collected:  11/24/18 1949    Specimen:  Blood Updated:  11/24/18 2209     Creatine Kinase 7,203 U/L     Lactic Acid, Plasma [[id number]]  (Normal) Collected:  11/24/18 2059    Specimen:  Blood Updated:  11/24/18 2114     Lactate 1.6 mmol/L     Urinalysis, Microscopic Only - Urine, Catheter [[id number]]  (Abnormal) Collected:  11/24/18 2025    Specimen:  Urine, Catheter Updated:  11/24/18 2100     RBC, UA 13-20 /HPF      WBC, UA 0-2 /HPF      Bacteria, UA 1+ /HPF      Squamous Epithelial Cells, UA 0-2 /HPF      Hyaline Casts, UA 7-12 /LPF      Methodology Manual Light Microscopy    Urinalysis With Culture If Indicated - Urine, Catheter [[id number]]  (Abnormal) Collected:  11/24/18 2025    Specimen:  Urine, Catheter Updated:  11/24/18 2054     Color, UA Dark Yellow     Appearance, UA Cloudy     pH, UA <=5.0     Specific Gravity, UA 1.023     Glucose, UA Negative     Ketones, UA 15 mg/dL (1+)     Bilirubin, UA Small (1+)     Blood, UA Large (3+)     Protein,  mg/dL (2+)     Leuk Esterase, UA Trace     Nitrite, UA Negative     Urobilinogen, UA 1.0 E.U./dL    Comprehensive Metabolic Panel [[id number]]  (Abnormal) Collected:  11/24/18 1949    Specimen:  Blood Updated:  11/24/18 2009     Glucose 163 mg/dL      BUN 32 mg/dL      Creatinine 1.18 mg/dL      Sodium 146 mmol/L      Potassium 3.9 mmol/L      Chloride 104 mmol/L      CO2 25.0 mmol/L      Calcium 10.1 mg/dL      Total Protein 7.9 g/dL      Albumin 4.50 g/dL      ALT (SGPT) 36 U/L      AST (SGOT) 159 U/L      Alkaline Phosphatase 107 U/L      Total Bilirubin 1.7 mg/dL      eGFR Non African Amer 45 mL/min/1.73      Globulin 3.4 gm/dL      A/G Ratio 1.3 g/dL      BUN/Creatinine Ratio 27.1     Anion Gap 17.0 mmol/L     Narrative:       The MDRD GFR formula is only valid for adults with stable renal function between ages 18 and 70.     Protime-INR [[id number]]  (Normal) Collected:  11/24/18 1949    Specimen:  Blood Updated:  11/24/18 2008     Protime 13.0 Seconds      INR 0.95    aPTT [[id number]]  (Normal) Collected:  11/24/18 1949    Specimen:  Blood Updated:  11/24/18 2008     PTT 25.8 seconds     CBC & Differential [[id number]] Collected:  11/24/18 1949    Specimen:  Blood Updated:  11/24/18 1959    Narrative:       The following orders were created for panel order CBC & Differential.  Procedure                               Abnormality         Status                     ---------                               -----------         ------                     CBC Auto Differential[[id number]]         Abnormal            Final result                 Please view results for these tests on the individual orders.    CBC Auto Differential [[id number]]  (Abnormal) Collected:  11/24/18 1949    Specimen:  Blood Updated:  11/24/18 1959     WBC 16.40 10*3/mm3      RBC 4.88 10*6/mm3      Hemoglobin 15.3 g/dL      Hematocrit 44.8 %      MCV 91.8 fL      MCH 31.4 pg      MCHC 34.2 g/dL      RDW 13.0 %      RDW-SD 43.6 fl      MPV 9.6 fL      Platelets 253 10*3/mm3      Neutrophil % 85.3 %      Lymphocyte % 4.9 %      Monocyte % 9.3 %      Eosinophil % 0.0 %      Basophil % 0.2 %      Immature Grans % 0.3 %      Neutrophils, Absolute 13.99 10*3/mm3      Lymphocytes, Absolute 0.80 10*3/mm3      Monocytes, Absolute 1.53 10*3/mm3      Eosinophils, Absolute 0.00 10*3/mm3      Basophils, Absolute 0.03 10*3/mm3      Immature Grans, Absolute 0.05 10*3/mm3      nRBC 0.0 /100 WBC           .  Imaging Results (last 24 hours)     Procedure Component Value Units Date/Time    XR Hip With or Without Pelvis 2 - 3 View Right [[id number]] Collected:  11/24/18 2116     Updated:  11/24/18 2119    Narrative:       EXAMINATION: XR HIP W OR WO PELVIS 2-3 VIEW RIGHT-     11/24/2018 8:44 PM CST     HISTORY: Fall injury. Pelvis and right hip, 3 views.     The pelvic ring is intact.   No pubic  symphysis or sacroiliac joint diastasis.     No discrete soft tissue abnormality is seen.  No hip fracture is seen.  Mild right femoral head and acetabular spurring     Summary :  1. No acute bony abnormality is seen.  This report was finalized on 11/24/2018 21:16 by Dr. Shivam Melgar MD.    XR Elbow 2 View Right [[id number]] Collected:  11/24/18 2115     Updated:  11/24/18 2119    Narrative:       EXAMINATION: XR ELBOW 2 VW RIGHT-     11/24/2018 8:44 PM CST     HISTORY: RIGHT ELBOW ABRASION, SWELLING AND PAIN.  Fall injury.     Right elbow, 2 views.     The distal humerus and proximal radius and ulna have a normal  appearance.   The soft tissues are appropriate.  There is a normal appearance of the elbow joint.  No significant joint effusion is seen.       Impression:       1. No acute bony abnormality is seen.  This report was finalized on 11/24/2018 21:15 by Dr. Shivam Melgar MD.    XR Humerus Right [[id number]] Collected:  11/24/18 2115     Updated:  11/24/18 2118    Narrative:       EXAMINATION: XR HUMERUS RIGHT-     11/24/2018 8:44 PM CST     HISTORY: RIGHT ARM PAIN AND SWELLING.  Fall injury.     Right humerus, 2 views.     No humerus fracture is seen.     The shoulder and elbow are normal, to the extent visualized.   No discrete soft tissue abnormality is seen.     Summary:  1. No acute bony abnormality.              This report was finalized on 11/24/2018 21:15 by Dr. Shivam Melgar MD.    XR Chest 1 View [[id number]] Collected:  11/24/18 2114     Updated:  11/24/18 2118    Narrative:       EXAMINATION: XR CHEST 1 VW-     11/24/2018 8:43 PM CST     HISTORY: Fall injury. Blunt trauma.     One view chest x-ray compared with 5/26/2017.     Heart size is normal.  The mediastinum is within normal limits.      The lungs are normally expanded with no pneumonia or pneumothorax.       No congestive failure changes.                                                                       Impression:       1. No acute  disease.        This report was finalized on 11/24/2018 21:14 by Dr. Shivam Melgar MD.    CT Cervical Spine Without Contrast [[id number]] Collected:  11/24/18 2027     Updated:  11/24/18 2031    Narrative:       EXAMINATION: CT CERVICAL SPINE WO CONTRAST-      11/24/2018 8:03 PM CST     HISTORY: C-spine trauma, NEXUS/CCR negative, low risk.  Fall injury. Head and neck trauma.     In order to have a CT radiation dose as low as reasonably achievable  Automated Exposure Control was utilized for adjustment of the mA and/or  KV according to patient size.     DLP in mGycm= 309.     Axial, sagittal, and coronal noncontrast CT imaging.     Vertebral bodies and posterior elements are intact.     Reconstructed sagittal images show normal curvature.   There is no malalignment. Prevertebral soft tissues are normal.   Facet joints align normally.     Moderate degenerative disc, endplate, and facet joint degenerative  change.     Reconstructed coronal images show normal lateral mass alignment.       Impression:       1. No acute fracture.                                         This report was finalized on 11/24/2018 20:28 by Dr. Shivam Melgar MD.    CT Head Without Contrast [[id number]] Collected:  11/24/18 2026     Updated:  11/24/18 2030    Narrative:       EXAMINATION: CT HEAD WO CONTRAST-      11/24/2018 8:03 PM CST     HISTORY: Head trauma, ataxia     In order to have a CT radiation dose as low as reasonably achievable  Automated Exposure Control was utilized for adjustment of the mA and/or  KV according to patient size.     DLP in mGycm= noncontrast head CT compared with 9/6/2018.     Axial, sagittal, and coronal noncontrast CT imaging of the head.     The visualized paranasal sinuses are clear.     The brain and ventricles have an age appropriate appearance.   There is no hemorrhage or mass-effect.   No acute infarction is seen.     No calvarial abnormality.       Impression:       1. No acute intracranial abnormality is  seen.                                         This report was finalized on 11/24/2018 20:27 by Dr. Shivam Melgar MD.          CT of head without contrast showing no acute findings.  This was reviewed by me.    Impression    1.  Severe Parkinson's disease  2.  Hallucinations possibly secondary to a urinary tract infection causing a metabolic encephalopathy.  I cannot completely rule out that amantadine ER is the cause of these hallucinations.  I would like to remain conservative at this time and see if treatment of urinary tract infection improves her symptoms.  If it does not, we will discontinue the amantadine.    Plan    · Treatment of urinary tract infection by primary team  · Will discontinue amantadine if her confusion worsens or continues despite treatment of urinary tract infection  · She would be a great candidate for a deep brain stimulator at this point.  She has significant dopamine requirements and suffers from dyskinesia secondary to the excessive levels of dopamine.  I will leave this decision up to her and her primary neurologist.    I discussed the patients findings and my recommendations with patient and family    Neil Howard MD  11/25/18  1:32 PM

## 2018-11-25 NOTE — H&P
[facility] Medicine Services  HISTORY AND PHYSICAL    Date of Admission: 11/24/2018  Primary Care Physician: Beny Garner MD    Subjective     Chief Complaint: Altered mental status    History of Present Illness  73-year-old female was brought into the ER by family after found confused at home.  Patient was apparently in her closet hiding out and hallucinating.  Family states her baseline mentality was somewhat normal.  For last 2 weeks she was noted to be progressively more confused.  Today they found her in the closet confused and altered.  Patient was brought into the ER for further evaluation and treatment.  Heart imaging studies did not show any acute findings.  Urinalysis noted for UTI.  Other labs noted for hypernatremia, transaminitis and leukocytosis.  CK was significantly elevated to be above 7000.  Patient will be admitted for further evaluation and treatment.    Review of Systems     Otherwise complete ROS reviewed and negative except as mentioned in the HPI.    Past Medical History:   Past Medical History:   Diagnosis Date   • Depression    • Disease of thyroid gland    • Parkinson disease (CMS/HCC)      Past Surgical History:  Past Surgical History:   Procedure Laterality Date   • CHOLECYSTECTOMY     • CYST REMOVAL       Social History:  reports that  has never smoked. She does not have any smokeless tobacco history on file. She reports that she does not drink alcohol or use drugs.    Family History: Family history of hypertension and diabetes    Allergies:  Allergies   Allergen Reactions   • Iodinated Diagnostic Agents    • Sulfa Antibiotics      Medications:  Prior to Admission medications    Medication Sig Start Date End Date Taking? Authorizing Provider   Amantadine HCl ER (GOCOVRI) 137 MG capsule sustained-release 24 hr Take  by mouth.   Yes Provider, MD Payton   carbidopa-levodopa (SINEMET)  MG per tablet Take 2 tablet by mouth 3 times daily     "Payton Lewis MD   carbidopa-levodopa CR (SINEMET CR)  MG per CR tablet Take 1 tablet by mouth three times daily 5/20/16   Payton Lewis MD   DICLOFENAC PO Take 75 mg by mouth 2 (two) times a day.    Payton Lewis MD   levothyroxine (SYNTHROID, LEVOTHROID) 75 MCG tablet Take 75 mcg by mouth daily  3/14/16   Payton Lewis MD   PARoxetine (PAXIL) 20 MG tablet Take 20 mg by mouth every morning  5/14/16   Payton Lewis MD   PARoxetine (PAXIL) 20 MG tablet TAKE ONE TABLET BY MOUTH ONCE DAILY IN THE MORNING 5/25/18   Payton Lewis MD   predniSONE (DELTASONE) 20 MG tablet Take 20 mg by mouth 2 (two) times a day.    Payton Lewis MD   rOPINIRole (REQUIP) 0.25 MG tablet Take 1 tablet by mouth 3 times daily 5/20/16   Payton Lewis MD   Safinamide Mesylate 50 MG tablet Take 50 mg by mouth. 4/17/18   Payton Lewis MD     Objective     Vital Signs: /69   Pulse 89   Temp 97.7 °F (36.5 °C) (Oral)   Resp 18   Ht 167.6 cm (66\")   Wt 71.9 kg (158 lb 9.6 oz)   SpO2 96%   BMI 25.60 kg/m²   Physical Exam   Constitutional:   Altered mental status   HENT:   Head: Normocephalic.   Eyes: Pupils are equal, round, and reactive to light.   Neck: Normal range of motion.   Cardiovascular: Normal rate and regular rhythm.   Pulmonary/Chest: Effort normal and breath sounds normal.   Abdominal: Soft. Bowel sounds are normal.   Musculoskeletal: Normal range of motion.   Neurological: She is alert.   Altered mental status   Skin: Skin is warm. Capillary refill takes less than 2 seconds.   Psychiatric:   Altered mental status             Results Reviewed:  Lab Results (last 24 hours)     Procedure Component Value Units Date/Time    CK [[id number]]  (Abnormal) Collected:  11/24/18 1949    Specimen:  Blood Updated:  11/24/18 2209     Creatine Kinase 7,203 U/L     Lactic Acid, Plasma [[id number]]  (Normal) Collected:  11/24/18 2059    Specimen:  Blood Updated:  " 11/24/18 2114     Lactate 1.6 mmol/L     Urinalysis, Microscopic Only - Urine, Catheter [[id number]]  (Abnormal) Collected:  11/24/18 2025    Specimen:  Urine, Catheter Updated:  11/24/18 2100     RBC, UA 13-20 /HPF      WBC, UA 0-2 /HPF      Bacteria, UA 1+ /HPF      Squamous Epithelial Cells, UA 0-2 /HPF      Hyaline Casts, UA 7-12 /LPF      Methodology Manual Light Microscopy    Urinalysis With Culture If Indicated - Urine, Catheter [[id number]]  (Abnormal) Collected:  11/24/18 2025    Specimen:  Urine, Catheter Updated:  11/24/18 2054     Color, UA Dark Yellow     Appearance, UA Cloudy     pH, UA <=5.0     Specific Gravity, UA 1.023     Glucose, UA Negative     Ketones, UA 15 mg/dL (1+)     Bilirubin, UA Small (1+)     Blood, UA Large (3+)     Protein,  mg/dL (2+)     Leuk Esterase, UA Trace     Nitrite, UA Negative     Urobilinogen, UA 1.0 E.U./dL    Comprehensive Metabolic Panel [[id number]]  (Abnormal) Collected:  11/24/18 1949    Specimen:  Blood Updated:  11/24/18 2009     Glucose 163 mg/dL      BUN 32 mg/dL      Creatinine 1.18 mg/dL      Sodium 146 mmol/L      Potassium 3.9 mmol/L      Chloride 104 mmol/L      CO2 25.0 mmol/L      Calcium 10.1 mg/dL      Total Protein 7.9 g/dL      Albumin 4.50 g/dL      ALT (SGPT) 36 U/L      AST (SGOT) 159 U/L      Alkaline Phosphatase 107 U/L      Total Bilirubin 1.7 mg/dL      eGFR Non African Amer 45 mL/min/1.73      Globulin 3.4 gm/dL      A/G Ratio 1.3 g/dL      BUN/Creatinine Ratio 27.1     Anion Gap 17.0 mmol/L     Narrative:       The MDRD GFR formula is only valid for adults with stable renal function between ages 18 and 70.    Protime-INR [[id number]]  (Normal) Collected:  11/24/18 1949    Specimen:  Blood Updated:  11/24/18 2008     Protime 13.0 Seconds      INR 0.95    aPTT [[id number]]  (Normal) Collected:  11/24/18 1949    Specimen:  Blood Updated:  11/24/18 2008     PTT 25.8 seconds     CBC & Differential [[id number]] Collected:  11/24/18 1949    Specimen:   Blood Updated:  11/24/18 1959    Narrative:       The following orders were created for panel order CBC & Differential.  Procedure                               Abnormality         Status                     ---------                               -----------         ------                     CBC Auto Differential[[id number]]         Abnormal            Final result                 Please view results for these tests on the individual orders.    CBC Auto Differential [[id number]]  (Abnormal) Collected:  11/24/18 1949    Specimen:  Blood Updated:  11/24/18 1959     WBC 16.40 10*3/mm3      RBC 4.88 10*6/mm3      Hemoglobin 15.3 g/dL      Hematocrit 44.8 %      MCV 91.8 fL      MCH 31.4 pg      MCHC 34.2 g/dL      RDW 13.0 %      RDW-SD 43.6 fl      MPV 9.6 fL      Platelets 253 10*3/mm3      Neutrophil % 85.3 %      Lymphocyte % 4.9 %      Monocyte % 9.3 %      Eosinophil % 0.0 %      Basophil % 0.2 %      Immature Grans % 0.3 %      Neutrophils, Absolute 13.99 10*3/mm3      Lymphocytes, Absolute 0.80 10*3/mm3      Monocytes, Absolute 1.53 10*3/mm3      Eosinophils, Absolute 0.00 10*3/mm3      Basophils, Absolute 0.03 10*3/mm3      Immature Grans, Absolute 0.05 10*3/mm3      nRBC 0.0 /100 WBC         Imaging Results (last 24 hours)     Procedure Component Value Units Date/Time    XR Hip With or Without Pelvis 2 - 3 View Right [[id number]] Collected:  11/24/18 2116     Updated:  11/24/18 2119    Narrative:       EXAMINATION: XR HIP W OR WO PELVIS 2-3 VIEW RIGHT-     11/24/2018 8:44 PM CST     HISTORY: Fall injury. Pelvis and right hip, 3 views.     The pelvic ring is intact.   No pubic symphysis or sacroiliac joint diastasis.     No discrete soft tissue abnormality is seen.  No hip fracture is seen.  Mild right femoral head and acetabular spurring     Summary :  1. No acute bony abnormality is seen.  This report was finalized on 11/24/2018 21:16 by Dr. Shivam Melgar MD.    XR Elbow 2 View Right [[id number]] Collected:   11/24/18 2115     Updated:  11/24/18 2119    Narrative:       EXAMINATION: XR ELBOW 2 VW RIGHT-     11/24/2018 8:44 PM CST     HISTORY: RIGHT ELBOW ABRASION, SWELLING AND PAIN.  Fall injury.     Right elbow, 2 views.     The distal humerus and proximal radius and ulna have a normal  appearance.   The soft tissues are appropriate.  There is a normal appearance of the elbow joint.  No significant joint effusion is seen.       Impression:       1. No acute bony abnormality is seen.  This report was finalized on 11/24/2018 21:15 by Dr. Shivam Melgar MD.    XR Humerus Right [[id number]] Collected:  11/24/18 2115     Updated:  11/24/18 2118    Narrative:       EXAMINATION: XR HUMERUS RIGHT-     11/24/2018 8:44 PM CST     HISTORY: RIGHT ARM PAIN AND SWELLING.  Fall injury.     Right humerus, 2 views.     No humerus fracture is seen.     The shoulder and elbow are normal, to the extent visualized.   No discrete soft tissue abnormality is seen.     Summary:  1. No acute bony abnormality.              This report was finalized on 11/24/2018 21:15 by Dr. Shivam Melgar MD.    XR Chest 1 View [[id number]] Collected:  11/24/18 2114     Updated:  11/24/18 2118    Narrative:       EXAMINATION: XR CHEST 1 VW-     11/24/2018 8:43 PM CST     HISTORY: Fall injury. Blunt trauma.     One view chest x-ray compared with 5/26/2017.     Heart size is normal.  The mediastinum is within normal limits.      The lungs are normally expanded with no pneumonia or pneumothorax.       No congestive failure changes.                                                                       Impression:       1. No acute disease.        This report was finalized on 11/24/2018 21:14 by Dr. Shivam Melgar MD.    CT Cervical Spine Without Contrast [[id number]] Collected:  11/24/18 2027     Updated:  11/24/18 2031    Narrative:       EXAMINATION: CT CERVICAL SPINE WO CONTRAST-      11/24/2018 8:03 PM CST     HISTORY: C-spine trauma, NEXUS/CCR negative, low risk.  Fall  injury. Head and neck trauma.     In order to have a CT radiation dose as low as reasonably achievable  Automated Exposure Control was utilized for adjustment of the mA and/or  KV according to patient size.     DLP in mGycm= 309.     Axial, sagittal, and coronal noncontrast CT imaging.     Vertebral bodies and posterior elements are intact.     Reconstructed sagittal images show normal curvature.   There is no malalignment. Prevertebral soft tissues are normal.   Facet joints align normally.     Moderate degenerative disc, endplate, and facet joint degenerative  change.     Reconstructed coronal images show normal lateral mass alignment.       Impression:       1. No acute fracture.                                         This report was finalized on 11/24/2018 20:28 by Dr. Shivam Melgar MD.    CT Head Without Contrast [[id number]] Collected:  11/24/18 2026     Updated:  11/24/18 2030    Narrative:       EXAMINATION: CT HEAD WO CONTRAST-      11/24/2018 8:03 PM CST     HISTORY: Head trauma, ataxia     In order to have a CT radiation dose as low as reasonably achievable  Automated Exposure Control was utilized for adjustment of the mA and/or  KV according to patient size.     DLP in mGycm= noncontrast head CT compared with 9/6/2018.     Axial, sagittal, and coronal noncontrast CT imaging of the head.     The visualized paranasal sinuses are clear.     The brain and ventricles have an age appropriate appearance.   There is no hemorrhage or mass-effect.   No acute infarction is seen.     No calvarial abnormality.       Impression:       1. No acute intracranial abnormality is seen.                                         This report was finalized on 11/24/2018 20:27 by Dr. Shivam Melgar MD.        I have personally reviewed and interpreted the radiology studies and ECG obtained at time of admission.     Assessment / Plan     Assessment:   Active Hospital Problems    Diagnosis   • Fall     1.  Altered mental status with  possible fall  2.  UTI  3.  Leukocytosis  4.  Hypernatremia  5.  Transaminitis  6.  Elevated CK    Plan:      -Admit to telemetry  -Monitor vitals  -Imaging studies noted to be negative   -fall precaution  -Continue IV antibiotics  -Follow-up blood culture and urine culture  -Follow-up a.m. WBC  -Continue IV fluid  -Patient received IV fluid bolus in the ER  -Follow-up hypernatremia workup  -Follow-up transaminitis workup  -Follow-up repeat CK and myoglobin level  -Consider aggressive fluid resuscitation if indicated  -Monitor renal panel  -GI prophylaxis  -DVT prophylaxis            Code Status: Full code     I discussed the patient's findings and my recommendations with the patient's RN    Estimated length of stay 2-3 days    Alfredo Grover MD   11/24/18   10:29 PM

## 2018-11-25 NOTE — ED PROVIDER NOTES
Subjective     -year-old female presenting to the emergency department after having fallen in the closet.  Patient is unaware of how she got there family members state that the patient has been hallucinating intermittently over the past 2 weeks after starting any Parkinson's medication.  Patient was at baseline state last night is believed that she went to her closet because she was trying to get something out and fell and was not able to get herself back up.  Patient's thinks that she was in a car accident but she also believes that some dogs were chasing her and she went into the closet.  Patient complains currently of right hip pain and right elbow pain but denies any chest pain headache abdominal pain is able to move all extremities well.            Review of Systems   Musculoskeletal: Positive for back pain and myalgias.   All other systems reviewed and are negative.      Past Medical History:   Diagnosis Date   • Depression    • Disease of thyroid gland    • Parkinson disease (CMS/HCC)        Allergies   Allergen Reactions   • Iodinated Diagnostic Agents    • Sulfa Antibiotics        Past Surgical History:   Procedure Laterality Date   • CHOLECYSTECTOMY     • CYST REMOVAL         History reviewed. No pertinent family history.    Social History     Socioeconomic History   • Marital status:      Spouse name: Not on file   • Number of children: Not on file   • Years of education: Not on file   • Highest education level: Not on file   Tobacco Use   • Smoking status: Never Smoker   Substance and Sexual Activity   • Alcohol use: No   • Drug use: No   • Sexual activity: Defer           Objective   Physical Exam   Constitutional: She is oriented to person, place, and time. She appears well-developed and well-nourished.   HENT:   Head: Normocephalic and atraumatic.   Eyes: EOM are normal. Pupils are equal, round, and reactive to light.   Neck: Normal range of motion. Neck supple.   No cervical spine tenderness,  no thoracic spine tenderness no lumbar spine tenderness   Cardiovascular: Normal rate, regular rhythm and normal heart sounds.   2+ pulses in upper and lower extremities   Pulmonary/Chest: Effort normal and breath sounds normal.   Abdominal: Soft. Bowel sounds are normal.   Musculoskeletal: Normal range of motion.   Abrasion and bruising over right hip with some tenderness at the right trochanter.  Right elbow abrasion with tenderness at the right olecranon.   Neurological: She is alert and oriented to person, place, and time.   Skin: Skin is warm. Capillary refill takes less than 2 seconds.   Psychiatric: She has a normal mood and affect. Her behavior is normal. Thought content normal.       Procedures           ED Course  ED Course as of Nov 24 2149   Sat Nov 24, 2018 2146 Patient imaging negative for any acute fractures or pathology.  Patient will be admitted to medicine for urinary tract infection with altered mental status.  Hemodynamically patient is stable.  Ceftriaxone given.  2 L IV fluid given for moderate dehydration.  [AP]      ED Course User Index  [AP] Daniel Maxwell MD                  [facility]      Final diagnoses:   Fall, initial encounter   Cystitis   Altered mental status, unspecified altered mental status type            Daniel Maxwell MD  11/24/18 2149

## 2018-11-25 NOTE — PLAN OF CARE
Problem: Patient Care Overview  Goal: Plan of Care Review  Outcome: Ongoing (interventions implemented as appropriate)   11/25/18 7276   Coping/Psychosocial   Plan of Care Reviewed With patient;caregiver  (DIMITRI Phillips)   Plan of Care Review   Progress no change  (Initial Evaluation )   OTHER   Outcome Summary Clinical Bedside Swallow Evaluation completed. History is signifcant for Parkinson Disease. S/p fall, altered mental status, and acute UTI. Upon entering room, patient was repositioned upright in bed. She was alert and cooperative. Disoriented to location and confused about situation. Voice is abnormal but chart review reveals that family state it is her baseline d/t Parkinson Disease. Oral motor assessment revealed generalized unsteady motor movements throughout oral cavity. Patient completed full array of consistencies except for mech soft. Multiple swallows noted with nectar and honey thick liquids. Audible swallow noted with thin liquid. Throat clear noted after regular solid trial and thin liquid trial. ST feels that throat clear was d/t patient habitially clearing throat as she was doing this prior to any PO intake. Adequate mastication of regular solid noted with moderate residue; however, patient stated that she did not feel comfortable with solid trial and would prefer softer foods. Chest xray shows that lungs are clear. ST recommends: 1) Mechanical soft diet 2) Thin liquids 3) Meds whole with thin liquids. ST will follow and treat.

## 2018-11-25 NOTE — THERAPY EVALUATION
Acute Care - Speech Language Pathology   Swallow Initial Evaluation [facility]     Patient Name: Purnima Sutherland  : 1945  MRN: [id number]  Today's Date: 2018               Admit Date: 2018  Clinical Bedside Swallow Evaluation completed. History is signifcant for Parkinson Disease. S/p fall, altered mental status, and acute UTI. Upon entering room, patient was repositioned upright in bed. She was alert and cooperative. Disoriented to location and confused about situation. Voice is abnormal but chart review reveals that family state it is her baseline d/t Parkinson Disease. Oral motor assessment revealed generalized unsteady motor movements throughout oral cavity. Patient completed full array of  consistencies except for mech soft. Multiple swallows noted with nectar and honey thick liquids. Audible swallow noted with thin liquid. Throat clear noted after regular solid trial and thin liquid trial. ST feels that throat clear was d/t patient habitially clearing throat as she was doing this prior to any PO intake. Adequate mastication of regular solid  noted with moderate residue; however, patient stated that she did not feel comfortable with solid trial and would prefer softer foods. Chest xray shows that lungs are clear. ST recommends: 1) Mechanical soft diet 2) Thin liquids 3) Meds whole with thin liquids. ST will follow and treat.   Shae Barrios MS, CFY-SLP 2018 7:49 AM    Visit Dx:     ICD-10-CM ICD-9-CM   1. Fall, initial encounter W19.XXXA E888.9   2. Cystitis N30.90 595.9   3. Altered mental status, unspecified altered mental status type R41.82 780.97   4. Dysphagia, unspecified type R13.10 787.20     Patient Active Problem List   Diagnosis   • Osteoporosis, post-menopausal   • Fall     Past Medical History:   Diagnosis Date   • Depression    • Disease of thyroid gland    • Parkinson disease (CMS/HCC)      Past Surgical History:   Procedure Laterality Date   • CHOLECYSTECTOMY     •  CYST REMOVAL          SWALLOW EVALUATION (last 72 hours)      SLP Adult Swallow Evaluation     Row Name 11/25/18 0653                   Rehab Evaluation    Document Type  evaluation  -MM        Subjective Information  no complaints  -MM        Patient Observations  alert;cooperative;agree to therapy  -MM        Patient/Family Observations  No family present.  -MM        Patient Effort  good  -MM        Symptoms Noted During/After Treatment  none  -MM           General Information    Patient Profile Reviewed  yes  -MM        Pertinent History Of Current Problem  AMS, UTI, Parkinson Disease, Chest xray clear   -MM        Current Method of Nutrition  NPO  -MM        Precautions/Limitations, Vision  WFL;for purposes of eval  -MM        Precautions/Limitations, Hearing  WFL;for purposes of eval  -MM        Prior Level of Function-Communication  motor speech impairment  -MM        Prior Level of Function-Swallowing  no diet consistency restrictions  -MM        Plans/Goals Discussed with  patient;other (see comments);agreed upon RN Jacqueline   -MM        Barriers to Rehab  none identified  -MM        Patient's Goals for Discharge  return to PO diet  -MM           Pain Assessment    Additional Documentation  Pain Scale: FACES Pre/Post-Treatment (Group)  -MM           Pain Scale: FACES Pre/Post-Treatment    Pain: FACES Scale, Pretreatment  0-->no hurt  -MM        Pain: FACES Scale, Post-Treatment  0-->no hurt  -MM           Oral Motor and Function    Dentition Assessment  natural, present and adequate  -MM        Secretion Management  WNL/WFL  -MM        Mucosal Quality  dry  -MM        Volitional Swallow  delayed  -MM        Volitional Cough  weak  -MM           Oral Musculature and Cranial Nerve Assessment    Oral Motor General Assessment  generalized oral motor weakness  -MM        Mandibular Impairment Detail, Cranial Nerve V (Trigeminal)  CN5: motor impairment  -MM        Oral Labial or Buccal Impairment, Detail, Cranial  Nerve VII (Facial):  CN7: Motor Impairment  -MM        Lingual Impairment, Detail. Cranial Nerves IX, XII (Glossopharyngeal and Hypoglossal)  CN12: Motor Impairment  -MM        Vocal Impairment, Detail. Cranial Nerve X (Vagus)  CN10: Motor;vocal quality abnormality (see comments)  -MM           General Eating/Swallowing Observations    Respiratory Support Currently in Use  room air  -MM        Eating/Swallowing Skills  fed by SLP  -MM        Positioning During Eating  upright in bed  -MM        Utensils Used  spoon;cup;straw  -MM        Consistencies Trialed  regular textures;honey-thick liquids;nectar/syrup-thick liquids;thin liquids;pureed  -MM           Respiratory    Respiratory Status  room air;WFL  -MM           Clinical Swallow Eval    Oral Prep Phase  WFL  -MM        Oral Transit  WFL  -MM        Oral Residue  impaired  -MM        Pharyngeal Phase  suspected pharyngeal impairment  -MM        Esophageal Phase  unremarkable  -MM        Clinical Swallow Evaluation Summary  Clinical Bedside Swallow Evaluation completed. History is signifcant for Parkinson Disease. S/p fall, altered mental status, and acute UTI. Upon entering room, patient was repositioned upright in bed. She was alert and cooperative. Disoriented to location and confused about situation. Voice is abnormal but chart review reveals that family state it is her baseline d/t Parkinson Disease. Oral motor assessment revealed generalized unsteady motor movements throughout oral cavity. Patient completed full array of  consistencies except for mech soft. Multiple swallows noted with nectar and honey thick liquids. Audible swallow noted with thin liquid. Throat clear noted after regular solid trial and thin liquid trial. ST feels that throat clear was d/t patient habitially clearing throat as she was doing this prior to any PO intake. Adequate mastication of regular solid  noted with moderate residue; however, patient stated that she did not feel  comfortable with solid trial and would prefer softer foods. Chest xray shows that lungs are clear. ST recommends: 1) Mechanical soft diet 2) Thin liquids 3) Meds whole with thin liquids. ST will follow and treat.   -MM           Oral Residue Concerns    Oral Residue Concerns  diffuse residue throughout oral cavity  -MM        Diffuse Residue Throughout Oral Cavity  regular consistencies  -MM           Pharyngeal Phase Concerns    Pharyngeal Phase Concerns  multiple swallows;throat clear;other (see comments)  -MM        Multiple Swallows  honey;nectar  -MM        Throat Clear  thin;regular consistencies  -MM        Pharyngeal Phase Concerns, Comment  Audible swallows noted   -MM           Clinical Impression    SLP Swallowing Diagnosis  mild;oral dysfunction;suspected pharyngeal dysfunction  -MM        Functional Impact  risk of aspiration/pneumonia  -MM        Rehab Potential/Prognosis, Swallowing  good, to achieve stated therapy goals  -MM        Swallow Criteria for Skilled Therapeutic Interventions Met  demonstrates skilled criteria  -MM           Recommendations    Therapy Frequency (Swallow)  at least;3 days per week  -MM        Predicted Duration Therapy Intervention (Days)  until discharge  -MM        SLP Diet Recommendation  soft textures;chopped;thin liquids  -MM        Recommended Precautions and Strategies  upright posture during/after eating;small bites of food and sips of liquid  -MM        SLP Rec. for Method of Medication Administration  meds whole;with thin liquids;as tolerated  -MM        Monitor for Signs of Aspiration  yes;notify SLP if any concerns;cough;right lower lobe infiltrates  -MM        Anticipated Dischage Disposition  unknown  -MM           Swallow Goals (SLP)    Oral Nutrition/Hydration Goal Selection (SLP)  oral nutrition/hydration, SLP goal 1  -MM           Oral Nutrition/Hydration Goal 1 (SLP)    Oral Nutrition/Hydration Goal 1, SLP  LTG: Patient will tolerate LRD without s/s of  aspiration.  -MM        Time Frame (Oral Nutrition/Hydration Goal 1, SLP)  by discharge  -MM        Barriers (Oral Nutrition/Hydration Goal 1, SLP)  n/a  -MM        Progress/Outcomes (Oral Nutrition/Hydration Goal 1, SLP)  goal ongoing  -MM          User Key  (r) = Recorded By, (t) = Taken By, (c) = Cosigned By    Initials Name Effective Dates    Shae Madrid, MS, CFY-SLP 07/03/18 -           EDUCATION  The patient has been educated in the following areas:   Dysphagia (Swallowing Impairment).    SLP Recommendation and Plan  SLP Swallowing Diagnosis: mild, oral dysfunction, suspected pharyngeal dysfunction  SLP Diet Recommendation: soft textures, chopped, thin liquids  Recommended Precautions and Strategies: upright posture during/after eating, small bites of food and sips of liquid     Monitor for Signs of Aspiration: yes, notify SLP if any concerns, cough, right lower lobe infiltrates     Swallow Criteria for Skilled Therapeutic Interventions Met: demonstrates skilled criteria  Anticipated Dischage Disposition: unknown  Rehab Potential/Prognosis, Swallowing: good, to achieve stated therapy goals  Therapy Frequency (Swallow): at least, 3 days per week  Predicted Duration Therapy Intervention (Days): until discharge       Plan of Care Reviewed With: patient, caregiver(DIMITRI Phillips)  Plan of Care Review  Plan of Care Reviewed With: patient, caregiver(DIMITRI Phillips)  Progress: no change(Initial Evaluation )  Outcome Summary: Clinical Bedside Swallow Evaluation completed. History is signifcant for Parkinson Disease. S/p fall, altered mental status, and acute UTI. Upon entering room, patient was repositioned upright in bed. She was alert and cooperative. Disoriented to location and confused about situation. Voice is abnormal but chart review reveals that family state it is her baseline d/t Parkinson Disease. Oral motor assessment revealed generalized unsteady motor movements throughout oral cavity. Patient completed  full array of  consistencies except for mech soft. Multiple swallows noted with nectar and honey thick liquids. Audible swallow noted with thin liquid. Throat clear noted after regular solid trial and thin liquid trial. ST feels that throat clear was d/t patient habitially clearing throat as she was doing this prior to any PO intake. Adequate mastication of regular solid  noted with moderate residue; however, patient stated that she did not feel comfortable with solid trial and would prefer softer foods. Chest xray shows that lungs are clear. ST recommends: 1) Mechanical soft diet 2) Thin liquids 3) Meds whole with thin liquids. ST will follow and treat.     SLP GOALS     Row Name 11/25/18 0653             Oral Nutrition/Hydration Goal 1 (SLP)    Oral Nutrition/Hydration Goal 1, SLP  LTG: Patient will tolerate LRD without s/s of aspiration.  -MM      Time Frame (Oral Nutrition/Hydration Goal 1, SLP)  by discharge  -MM      Barriers (Oral Nutrition/Hydration Goal 1, SLP)  n/a  -MM      Progress/Outcomes (Oral Nutrition/Hydration Goal 1, SLP)  goal ongoing  -MM        User Key  (r) = Recorded By, (t) = Taken By, (c) = Cosigned By    Initials Name Provider Type    Shae Madrid MS, CFGLORAI-SLP Speech and Language Pathologist           SLP Outcome Measures (last 72 hours)      SLP Outcome Measures     Row Name 11/25/18 0700             SLP Outcome Measures    Outcome Measure Used?  Adult NOMS  -MM         Adult FCM Scores    FCM Chosen  Swallowing  -MM      Swallowing FCM Score  5  -MM        User Key  (r) = Recorded By, (t) = Taken By, (c) = Cosigned By    Initials Name Effective Dates    Shae Madrid MS, HAILEY-SLP 07/03/18 -            Time Calculation:   Time Calculation- SLP     Row Name 11/25/18 0748             Time Calculation- SLP    SLP Start Time  0653  -MM      SLP Stop Time  0748  -MM      SLP Time Calculation (min)  55 min  -MM      SLP Received On  11/25/18  -MM      SLP Goal Re-Cert Due  Date  12/05/18  -OMARI        User Key  (r) = Recorded By, (t) = Taken By, (c) = Cosigned By    Initials Name Provider Type    Shae aMdrid MS, CFY-SLP Speech and Language Pathologist          Therapy Charges for Today     Code Description Service Date Service Provider Modifiers Qty    66149942921 HC ST SWALLOWING CURRENT STATUS 11/25/2018 Shae Barrios MS, CFY-SLP GN, CJ 1    17438241429 HC ST SWALLOWING PROJECTED 11/25/2018 Shae Barrios MS, CFY-SLP GN, CI 1    66531336139 HC ST EVAL ORAL PHARYNG SWALLOW 4 11/25/2018 Shae Barrios MS, CFGLORIA-SLP GN 1          SLP G-Codes  SLP NOMS Used?: Yes  Functional Limitations: Swallowing  Swallow Current Status (): At least 20 percent but less than 40 percent impaired, limited or restricted  Swallow Goal Status (): At least 1 percent but less than 20 percent impaired, limited or restricted    Shae Barrios MS, HAILEY-SLP  11/25/2018

## 2018-11-26 ENCOUNTER — APPOINTMENT (OUTPATIENT)
Dept: ULTRASOUND IMAGING | Facility: HOSPITAL | Age: 73
End: 2018-11-26

## 2018-11-26 LAB
ALBUMIN SERPL-MCNC: 2.9 G/DL (ref 3.5–5)
ALBUMIN/GLOB SERPL: 1.2 G/DL (ref 1.1–2.5)
ALP SERPL-CCNC: 65 U/L (ref 24–120)
ALT SERPL W P-5'-P-CCNC: 25 U/L (ref 0–54)
ANION GAP SERPL CALCULATED.3IONS-SCNC: 5 MMOL/L (ref 4–13)
AST SERPL-CCNC: 167 U/L (ref 7–45)
BASOPHILS # BLD AUTO: 0.01 10*3/MM3 (ref 0–0.2)
BASOPHILS NFR BLD AUTO: 0.1 % (ref 0–2)
BILIRUB SERPL-MCNC: 0.7 MG/DL (ref 0.1–1)
BUN BLD-MCNC: 25 MG/DL (ref 5–21)
BUN/CREAT SERPL: 32.5 (ref 7–25)
CALCIUM SPEC-SCNC: 8.2 MG/DL (ref 8.4–10.4)
CHLORIDE SERPL-SCNC: 107 MMOL/L (ref 98–110)
CK SERPL-CCNC: 5679 U/L (ref 0–203)
CO2 SERPL-SCNC: 30 MMOL/L (ref 24–31)
CREAT BLD-MCNC: 0.77 MG/DL (ref 0.5–1.4)
DEPRECATED RDW RBC AUTO: 47.5 FL (ref 40–54)
EOSINOPHIL # BLD AUTO: 0.06 10*3/MM3 (ref 0–0.7)
EOSINOPHIL NFR BLD AUTO: 0.5 % (ref 0–4)
ERYTHROCYTE [DISTWIDTH] IN BLOOD BY AUTOMATED COUNT: 13.4 % (ref 12–15)
GFR SERPL CREATININE-BSD FRML MDRD: 73 ML/MIN/1.73
GLOBULIN UR ELPH-MCNC: 2.4 GM/DL
GLUCOSE BLD-MCNC: 77 MG/DL (ref 70–100)
HCT VFR BLD AUTO: 36.8 % (ref 37–47)
HGB BLD-MCNC: 11.6 G/DL (ref 12–16)
IMM GRANULOCYTES # BLD: 0.05 10*3/MM3 (ref 0–0.03)
IMM GRANULOCYTES NFR BLD: 0.5 % (ref 0–5)
LYMPHOCYTES # BLD AUTO: 1.75 10*3/MM3 (ref 0.72–4.86)
LYMPHOCYTES NFR BLD AUTO: 15.8 % (ref 15–45)
MCH RBC QN AUTO: 30.4 PG (ref 28–32)
MCHC RBC AUTO-ENTMCNC: 31.5 G/DL (ref 33–36)
MCV RBC AUTO: 96.3 FL (ref 82–98)
MONOCYTES # BLD AUTO: 1.12 10*3/MM3 (ref 0.19–1.3)
MONOCYTES NFR BLD AUTO: 10.1 % (ref 4–12)
MYOGLOBIN SERPL-MCNC: 529 NG/ML (ref 0–110)
NEUTROPHILS # BLD AUTO: 8.08 10*3/MM3 (ref 1.87–8.4)
NEUTROPHILS NFR BLD AUTO: 73 % (ref 39–78)
NRBC BLD MANUAL-RTO: 0 /100 WBC (ref 0–0)
PLATELET # BLD AUTO: 156 10*3/MM3 (ref 130–400)
PMV BLD AUTO: 10 FL (ref 6–12)
POTASSIUM BLD-SCNC: 3.6 MMOL/L (ref 3.5–5.3)
PROT SERPL-MCNC: 5.3 G/DL (ref 6.3–8.7)
RBC # BLD AUTO: 3.82 10*6/MM3 (ref 4.2–5.4)
SODIUM BLD-SCNC: 142 MMOL/L (ref 135–145)
WBC NRBC COR # BLD: 11.07 10*3/MM3 (ref 4.8–10.8)

## 2018-11-26 PROCEDURE — 99232 SBSQ HOSP IP/OBS MODERATE 35: CPT | Performed by: PSYCHIATRY & NEUROLOGY

## 2018-11-26 PROCEDURE — 97116 GAIT TRAINING THERAPY: CPT

## 2018-11-26 PROCEDURE — G8979 MOBILITY GOAL STATUS: HCPCS

## 2018-11-26 PROCEDURE — 93880 EXTRACRANIAL BILAT STUDY: CPT | Performed by: SURGERY

## 2018-11-26 PROCEDURE — 92526 ORAL FUNCTION THERAPY: CPT

## 2018-11-26 PROCEDURE — 85025 COMPLETE CBC W/AUTO DIFF WBC: CPT | Performed by: NURSE PRACTITIONER

## 2018-11-26 PROCEDURE — 97165 OT EVAL LOW COMPLEX 30 MIN: CPT

## 2018-11-26 PROCEDURE — 76705 ECHO EXAM OF ABDOMEN: CPT

## 2018-11-26 PROCEDURE — 80053 COMPREHEN METABOLIC PANEL: CPT | Performed by: NURSE PRACTITIONER

## 2018-11-26 PROCEDURE — 97110 THERAPEUTIC EXERCISES: CPT

## 2018-11-26 PROCEDURE — 25010000002 CEFTRIAXONE PER 250 MG: Performed by: FAMILY MEDICINE

## 2018-11-26 PROCEDURE — 97161 PT EVAL LOW COMPLEX 20 MIN: CPT

## 2018-11-26 PROCEDURE — G8987 SELF CARE CURRENT STATUS: HCPCS

## 2018-11-26 PROCEDURE — 83874 ASSAY OF MYOGLOBIN: CPT | Performed by: NURSE PRACTITIONER

## 2018-11-26 PROCEDURE — G8978 MOBILITY CURRENT STATUS: HCPCS

## 2018-11-26 PROCEDURE — 82550 ASSAY OF CK (CPK): CPT | Performed by: NURSE PRACTITIONER

## 2018-11-26 PROCEDURE — G8988 SELF CARE GOAL STATUS: HCPCS

## 2018-11-26 PROCEDURE — 93880 EXTRACRANIAL BILAT STUDY: CPT

## 2018-11-26 PROCEDURE — 25010000002 HEPARIN (PORCINE) PER 1000 UNITS: Performed by: FAMILY MEDICINE

## 2018-11-26 RX ADMIN — HEPARIN SODIUM 5000 UNITS: 5000 INJECTION, SOLUTION INTRAVENOUS; SUBCUTANEOUS at 22:15

## 2018-11-26 RX ADMIN — CARBIDOPA AND LEVODOPA 2 TABLET: 25; 100 TABLET ORAL at 05:31

## 2018-11-26 RX ADMIN — ROPINIROLE HYDROCHLORIDE 0.25 MG: 0.25 TABLET, FILM COATED ORAL at 14:25

## 2018-11-26 RX ADMIN — SODIUM CHLORIDE, PRESERVATIVE FREE 3 ML: 5 INJECTION INTRAVENOUS at 22:16

## 2018-11-26 RX ADMIN — SODIUM CHLORIDE, POTASSIUM CHLORIDE, SODIUM LACTATE AND CALCIUM CHLORIDE 125 ML/HR: 600; 310; 30; 20 INJECTION, SOLUTION INTRAVENOUS at 22:22

## 2018-11-26 RX ADMIN — AMANTADINE HYDROCHLORIDE 100 MG: 100 TABLET ORAL at 10:20

## 2018-11-26 RX ADMIN — SODIUM CHLORIDE, POTASSIUM CHLORIDE, SODIUM LACTATE AND CALCIUM CHLORIDE 125 ML/HR: 600; 310; 30; 20 INJECTION, SOLUTION INTRAVENOUS at 14:24

## 2018-11-26 RX ADMIN — LEVOTHYROXINE SODIUM 75 MCG: 0.07 TABLET ORAL at 05:31

## 2018-11-26 RX ADMIN — SODIUM CHLORIDE, POTASSIUM CHLORIDE, SODIUM LACTATE AND CALCIUM CHLORIDE 125 ML/HR: 600; 310; 30; 20 INJECTION, SOLUTION INTRAVENOUS at 02:03

## 2018-11-26 RX ADMIN — CEFTRIAXONE SODIUM 1 G: 1 INJECTION, POWDER, FOR SOLUTION INTRAMUSCULAR; INTRAVENOUS at 22:16

## 2018-11-26 RX ADMIN — ROPINIROLE HYDROCHLORIDE 0.25 MG: 0.25 TABLET, FILM COATED ORAL at 05:31

## 2018-11-26 RX ADMIN — ROPINIROLE HYDROCHLORIDE 0.25 MG: 0.25 TABLET, FILM COATED ORAL at 22:16

## 2018-11-26 RX ADMIN — CARBIDOPA AND LEVODOPA 2 TABLET: 25; 100 TABLET ORAL at 14:25

## 2018-11-26 RX ADMIN — PAROXETINE HYDROCHLORIDE 20 MG: 20 TABLET, FILM COATED ORAL at 10:20

## 2018-11-26 RX ADMIN — CARBIDOPA AND LEVODOPA 2 TABLET: 25; 100 TABLET ORAL at 22:15

## 2018-11-26 RX ADMIN — AMANTADINE HYDROCHLORIDE 100 MG: 100 TABLET ORAL at 22:16

## 2018-11-26 RX ADMIN — SODIUM CHLORIDE, PRESERVATIVE FREE 3 ML: 5 INJECTION INTRAVENOUS at 10:20

## 2018-11-26 RX ADMIN — HEPARIN SODIUM 5000 UNITS: 5000 INJECTION, SOLUTION INTRAVENOUS; SUBCUTANEOUS at 12:39

## 2018-11-26 RX ADMIN — FAMOTIDINE 40 MG: 20 TABLET, FILM COATED ORAL at 10:20

## 2018-11-26 NOTE — PLAN OF CARE
Problem: Patient Care Overview  Goal: Plan of Care Review  Outcome: Ongoing (interventions implemented as appropriate)   11/26/18 1005   Coping/Psychosocial   Plan of Care Reviewed With patient  (Simultaneous filing. User may be unaware of other data.)   Plan of Care Review   Progress no change  (Simultaneous filing. User may be unaware of other data.)   OTHER   Outcome Summary OT eval completed. Pt is alert and oriented to person, place and year. She has intention tremors of head, trunk, and UEs. Pt came to EOB with S with HOB elevated. Transferred and ambulated with CGA. She is unsteady due to h/o Parkinson's but had no LOB. She can don/doff socks at EOB with SBA. OT will continue to work with pt on home safety education. She appears to be at baseline function but reports increased falls in past year and has never had PT/OT for Parkinson's diagnosis. Anticipate discharge home when stable.   (Simultaneous filing. User may be unaware of other data.)

## 2018-11-26 NOTE — PROGRESS NOTES
[facility] Services  INPATIENT PROGRESS NOTE    Length of Stay: 2  Date of Admission: 11/24/2018  Primary Care Physician: Beny Garner MD    Subjective   Chief Complaint: confusion improved, walked with therapy  HPI   Sitting up in chair. No family in room.  She is alert and answers most questions appropriately.  Tremors in hands and legs much improved.  She denies nausea, vomiting or abdominal pain.  She denies chest pain, palpitations or shortness of breath.  She think she would be able to void and Juarez catheter was removed.  Will discuss with family if they are interested in SNF.    Review of Systems   Constitutional: Positive for activity change. Negative for fever.   HENT: Negative for congestion and trouble swallowing.    Eyes: Negative for photophobia and visual disturbance.   Respiratory: Negative for cough, shortness of breath and wheezing.    Cardiovascular: Negative for chest pain, palpitations and leg swelling.   Gastrointestinal: Negative for constipation, diarrhea, nausea and vomiting.   Endocrine: Negative for cold intolerance, heat intolerance and polyuria.   Genitourinary: Negative for dysuria and urgency.   Musculoskeletal: Positive for gait problem.   Skin: Negative for wound.   Allergic/Immunologic: Negative for immunocompromised state.   Neurological: Positive for tremors (Chronic upper extremities and lower extremity secondary to Parkinson's) and weakness.   Hematological: Negative for adenopathy. Does not bruise/bleed easily.   Psychiatric/Behavioral: Positive for confusion (Intermittent ). Negative for agitation and behavioral problems.     All pertinent negatives and positives are as above. All other systems have been reviewed and are negative unless otherwise stated.     Objective    Temp:  [97.4 °F (36.3 °C)-98.9 °F (37.2 °C)] 97.4 °F (36.3 °C)  Heart Rate:  [73-78] 74  Resp:  [16-18] 16  BP: (119-142)/(59-72) 142/72  Physical  Exam  Constitutional: She is oriented to person, place, and time. She appears well-developed.   Ill appearing   HENT:   Head: Normocephalic and atraumatic.   Eyes: EOM are normal. Pupils are equal, round, and reactive to light.   Neck: Normal range of motion. Neck supple.   Cardiovascular: Normal rate, regular rhythm, normal heart sounds and intact distal pulses. Exam reveals no gallop and no friction rub.   No murmur heard.  Normal sinus rhythm 72-78 on telemetry   Pulmonary/Chest: Effort normal and breath sounds normal. No respiratory distress. She has no wheezes. She has no rales.   No oxygen in place.   Abdominal: Soft. Bowel sounds are normal. She exhibits no distension. There is no tenderness.   Musculoskeletal: She exhibits tenderness (Right hip, right forearm). She exhibits no edema.   Neurological: She is oriented to person, place, and time.   Answers most questions appropriately.  Identifies herself. Tells me correctly the names of her sons.  Able to tell me the name of her primary care provider and Dr. Ritchie is her neurologist.  Identifies year. Knows she is in the hospital.  Skin:   Bruising left lower extremity.  Abrasion right hip, abrasion right elbow      Results Review:  I have reviewed the labs, radiology results, and diagnostic studies.    Laboratory Data:   Results from last 7 days   Lab Units  11/26/18 0435 11/25/18 0609 11/24/18 1949   WBC 10*3/mm3  11.07*  14.63*  16.40*   HEMOGLOBIN g/dL  11.6*  13.1  15.3   HEMATOCRIT %  36.8*  39.3  44.8   PLATELETS 10*3/mm3  156  113*  253        Results from last 7 days   Lab Units  11/26/18 0435 11/25/18 0609  11/24/18 1949   SODIUM mmol/L  142  146*  146*   POTASSIUM mmol/L  3.6  3.6  3.9   CHLORIDE mmol/L  107  111*  104   CO2 mmol/L  30.0  26.0  25.0   BUN mg/dL  25*  33*  32*   CREATININE mg/dL  0.77  0.85  1.18   CALCIUM mg/dL  8.2*  8.5  10.1   BILIRUBIN mg/dL  0.7  1.3*  1.7*   ALK PHOS U/L  65  76  107   ALT (SGPT) U/L  25  65*   36   AST (SGOT) U/L  167*  208*  159*   GLUCOSE mg/dL  77  111*  163*     Blood Culture   Date Value Ref Range Status   11/24/2018 No growth at 24 hours  Preliminary   11/24/2018 No growth at 24 hours  Preliminary     Urine Culture   Date Value Ref Range Status   11/25/2018 No growth at 24 hours  Preliminary     Imaging Results (all)     Procedure Component Value Units Date/Time    US Carotid Bilateral [[id number]] Updated:  11/26/18 0830    US Abdomen Limited [[id number]] Updated:  11/26/18 0806    XR Hip With or Without Pelvis 2 - 3 View Right [[id number]] Collected:  11/24/18 2116     Updated:  11/24/18 2119    Narrative:       EXAMINATION: XR HIP W OR WO PELVIS 2-3 VIEW RIGHT-     11/24/2018 8:44 PM CST     HISTORY: Fall injury. Pelvis and right hip, 3 views.     The pelvic ring is intact.   No pubic symphysis or sacroiliac joint diastasis.     No discrete soft tissue abnormality is seen.  No hip fracture is seen.  Mild right femoral head and acetabular spurring     Summary :  1. No acute bony abnormality is seen.  This report was finalized on 11/24/2018 21:16 by Dr. Shivam Melgar MD.    XR Elbow 2 View Right [[id number]] Collected:  11/24/18 2115     Updated:  11/24/18 2119    Narrative:       EXAMINATION: XR ELBOW 2 VW RIGHT-     11/24/2018 8:44 PM CST     HISTORY: RIGHT ELBOW ABRASION, SWELLING AND PAIN.  Fall injury.     Right elbow, 2 views.     The distal humerus and proximal radius and ulna have a normal  appearance.   The soft tissues are appropriate.  There is a normal appearance of the elbow joint.  No significant joint effusion is seen.       Impression:       1. No acute bony abnormality is seen.  This report was finalized on 11/24/2018 21:15 by Dr. Shivam Melgar MD.    XR Humerus Right [[id number]] Collected:  11/24/18 2115     Updated:  11/24/18 2118    Narrative:       EXAMINATION: XR HUMERUS RIGHT-     11/24/2018 8:44 PM CST     HISTORY: RIGHT ARM PAIN AND SWELLING.  Fall injury.     Right humerus, 2  views.     No humerus fracture is seen.     The shoulder and elbow are normal, to the extent visualized.   No discrete soft tissue abnormality is seen.     Summary:  1. No acute bony abnormality.   This report was finalized on 11/24/2018 21:15 by Dr. Shivam Melgar MD.    XR Chest 1 View [[id number]] Collected:  11/24/18 2114     Updated:  11/24/18 2118    Narrative:       EXAMINATION: XR CHEST 1 VW-     11/24/2018 8:43 PM CST     HISTORY: Fall injury. Blunt trauma.     One view chest x-ray compared with 5/26/2017.     Heart size is normal.  The mediastinum is within normal limits.      The lungs are normally expanded with no pneumonia or pneumothorax.       No congestive failure changes.                                                                       Impression:       1. No acute disease.        This report was finalized on 11/24/2018 21:14 by Dr. Shivam Melgar MD.    CT Cervical Spine Without Contrast [[id number]] Collected:  11/24/18 2027     Updated:  11/24/18 2031    Narrative:       EXAMINATION: CT CERVICAL SPINE WO CONTRAST-      11/24/2018 8:03 PM CST     HISTORY: C-spine trauma, NEXUS/CCR negative, low risk.  Fall injury. Head and neck trauma.     In order to have a CT radiation dose as low as reasonably achievable  Automated Exposure Control was utilized for adjustment of the mA and/or  KV according to patient size.     DLP in mGycm= 309.     Axial, sagittal, and coronal noncontrast CT imaging.     Vertebral bodies and posterior elements are intact.     Reconstructed sagittal images show normal curvature.   There is no malalignment. Prevertebral soft tissues are normal.   Facet joints align normally.     Moderate degenerative disc, endplate, and facet joint degenerative  change.     Reconstructed coronal images show normal lateral mass alignment.       Impression:       1. No acute fracture.   This report was finalized on 11/24/2018 20:28 by Dr. Shivam Melgar MD.    CT Head Without Contrast [[id number]]  Collected:  11/24/18 2026     Updated:  11/24/18 2030    Narrative:       EXAMINATION: CT HEAD WO CONTRAST-      11/24/2018 8:03 PM CST     HISTORY: Head trauma, ataxia     In order to have a CT radiation dose as low as reasonably achievable  Automated Exposure Control was utilized for adjustment of the mA and/or  KV according to patient size.     DLP in mGycm= noncontrast head CT compared with 9/6/2018.     Axial, sagittal, and coronal noncontrast CT imaging of the head.     The visualized paranasal sinuses are clear.     The brain and ventricles have an age appropriate appearance.   There is no hemorrhage or mass-effect.   No acute infarction is seen.     No calvarial abnormality.       Impression:       1. No acute intracranial abnormality is seen.     This report was finalized on 11/24/2018 20:27 by Dr. Shivam Melgar MD.        Results for orders placed during the hospital encounter of 11/24/18   Adult Transthoracic Echo Complete W/ Cont if Necessary Per Protocol    Narrative · Left ventricular systolic function is normal. Estimated EF = 60%.  · Left ventricular diastolic dysfunction.  · No evidence of pulmonary hypertension is present.          Intake/Output    Intake/Output Summary (Last 24 hours) at 11/26/2018 1147  Last data filed at 11/26/2018 0420  Gross per 24 hour   Intake 120 ml   Output 750 ml   Net -630 ml       Scheduled Meds    amantadine 100 mg Oral Q12H   carbidopa-levodopa 2 tablet Oral Q8H   ceftriaxone 1 g Intravenous Q24H   famotidine 40 mg Oral Daily   heparin (porcine) 5,000 Units Subcutaneous Q12H   levothyroxine 75 mcg Oral Q AM   PARoxetine 20 mg Oral Daily   rOPINIRole 0.25 mg Oral Q8H   sodium chloride 3 mL Intravenous Q12H       I have reviewed the patient current medications.     Assessment/Plan     Assessment:  1.  Acute nontraumatic rhabdomyolysis secondary to suspected fall, CPK 9522, myoglobin 2116  2.  Suspected fall  3.  Acute cystitis, pyuria, present on admission. 1+ bacteria,  negative nitrates, WBC 6-12, culture no growth  4.  Mental status changes with hallucinations and metabolic encephalopathy suspect secondary to urinary tract infection, recent addition of amantadine may also be attributing factor  5.  Leukocytosis suspect secondary to fall and rhabdomyolysis  6.  Mild hypernatremia secondary to volume depletion and #1  7.  Elevated LFTs  8.  Parkinson's disease  9.  Hypothyroidism     Plan:  1.  Received lactated Ringer's 1 L in ER, Rocephin IV.  2.  IV fluid hydration lactated Ringer's at 125 mL per hour. Continue at present rate.    3.  CT cervical spine negative, CT head negative, x-ray right elbow no abnormality noted,  Right hip x-ray no abnormality, right humerus x-ray no abnormality.  4.  Echo 11/25/16 ejection fraction 60%.  No pulmonary hypertension.  Left ventricular diastolic dysfunction  5.  Noninvasive carotids pending  6.  Renal ultrasound results pending  7.  Neurology consulted.  After [place] following.  Amantadine continued.  Consider discontinuing if continues with hallucinations despite treatment for UTI.  Patient we candidate for deep brain stimulator.  She has significant dopamine requirements suffering from dyskinesia secondary to excessive levels of dopamine.  Defer to .  8.  Physical therapy consult.  Ambulated 75 feet.  Recommends physical therapy at discharge.  Consider SNF.  9.  Juarez catheter placed yesterday.  Discontinue today.  10.  Blood cultures no growth at 24 hours  11.  CK 9522 on admission. 5679 today.  Myoglobin 2116 on admission.  529 today.  Check CK tomorrow.CMP tomorrow.  12.  Social service consult for discharge planning.  Discussed SNF with sons.    The above documentation resulted from a face-to-face encounter by me Yady PACK, [facility].    Discharge Planning: I expect patient to be discharged to SNF in For home with home health in 1-2 days    SIRENA Klein   11/26/18   11:47 AM    I personally evaluated and  examined the patient in conjunction with SIRENA Hernandez and agree with the assessment, treatment plan, and disposition of the patient as recorded by her. My history, exam, and further recommendations are: I have reviewed and agree with the plans. Brett Osorio MD  11/26/18  6:48 PM

## 2018-11-26 NOTE — PROGRESS NOTES
Continued Stay Note  RASTA Gilbert     Patient Name: Purnima Sutherland  MRN: [id number]  Today's Date: 11/26/2018    Admit Date: 11/24/2018    Discharge Plan     Row Name 11/26/18 1159       Plan    Plan Comments  Patient desires to go home with Home Health. OT rec home. PT rec home with HH vs outpt rehab.  No placement indicated at this time. Will follow for additional evals and MD recommendations. Patient will to go for rehab if needed, rodrigue if rec per Anita MIKE.         Discharge Codes    No documentation.             Meghan Laguerre RN

## 2018-11-26 NOTE — PLAN OF CARE
Problem: Patient Care Overview  Goal: Plan of Care Review   11/26/18 1018   Coping/Psychosocial   Plan of Care Reviewed With patient   Plan of Care Review   Progress no change   OTHER   Outcome Summary PT eval completed. Pt is alert and oriented x4. Pt has tremors of head, trunk, and BUEs. Pt performed bed mobility with CGA, verbal cues, and HOB elevated. Pt transferred and ambulated in the burden (~75') with CGA. Pt demo's unsteady narrow KAYLEY, scissoring gait pattern. Pt demo's decreased strength in LLE and decreased ROM in R shoulder. Pt would benefit from skilled PT to address balance, endurance, strength, and fxl mobility in order to improve pt's safety and independence. Pt reports having a couple of falls in the past year and has never recieved therapy for her Parkinson's. PT recommends home with assist and PT HH vs SNF, upon d/c.

## 2018-11-26 NOTE — PROGRESS NOTES
Neurology Progress Note      Chief Complaint:  hallucinations    Subjective     Subjective:    Hallucinations resolved.  Back to baseline.  Son at bedside.    Medications:  Current Facility-Administered Medications   Medication Dose Route Frequency Provider Last Rate Last Dose   • amantadine (SYMMETREL) tablet 100 mg  100 mg Oral Q12H Neil Howard MD   100 mg at 11/26/18 1020   • carbidopa-levodopa (SINEMET)  MG per tablet 2 tablet  2 tablet Oral Q8H Alfredo Grover MD   2 tablet at 11/26/18 0531   • cefTRIAXone (ROCEPHIN) 1 g/10mL IV PUSH syringe  1 g Intravenous Q24H Alfredo Grover MD   1 g at 11/25/18 2113   • famotidine (PEPCID) tablet 40 mg  40 mg Oral Daily Alfredo Grover MD   40 mg at 11/26/18 1020   • heparin (porcine) 5000 UNIT/ML injection 5,000 Units  5,000 Units Subcutaneous Q12H Renetta Mancilla DO   5,000 Units at 11/26/18 1239   • lactated ringers infusion  125 mL/hr Intravenous Continuous Yady Collins APRN 125 mL/hr at 11/26/18 0203 125 mL/hr at 11/26/18 0203   • levothyroxine (SYNTHROID, LEVOTHROID) tablet 75 mcg  75 mcg Oral Q AM Alfredo Grover MD   75 mcg at 11/26/18 0531   • ondansetron (ZOFRAN) injection 4 mg  4 mg Intravenous Q6H PRN Alfredo Grover MD       • PARoxetine (PAXIL) tablet 20 mg  20 mg Oral Daily Alfredo Grover MD   20 mg at 11/26/18 1020   • rOPINIRole (REQUIP) tablet 0.25 mg  0.25 mg Oral Q8H Alfredo Grover MD   0.25 mg at 11/26/18 0531   • sodium chloride 0.9 % flush 3 mL  3 mL Intravenous Q12H Alfredo Grover MD   3 mL at 11/26/18 1020   • sodium chloride 0.9 % flush 3-10 mL  3-10 mL Intravenous PRN Alfredo Grover MD           Review of Systems:   -A 14 point review of systems is completed and is negative except for hallucinations      Objective      Vital Signs  Temp:  [97.4 °F (36.3 °C)-98.9 °F (37.2 °C)] 97.4 °F (36.3 °C)  Heart Rate:  [73-78] 74  Resp:  [16-18] 16  BP: (119-142)/(59-72) 142/72    Physical  Exam:    General Exam:  Head:  Normal cephalic, atraumatic  HEENT:  Neck supple  Fundoscopic Exam:  No signs of disc edema  CVS:  Regular rate and rhythm.  No murmurs  Carotid Examination:  No bruits  Lungs:  Clear to auscultation  Abdomen:  Non-tender, Non-distended  Extremities:  No signs of peripheral edema  Skin:  No rashes     Neurologic Exam:     Mental Status:    -Awake, Alert, Oriented X 3  -No word finding difficulties  -No aphasia  -No dysarthria  -Follows simple and complex commands     CN II:  Visual fields full.  Pupils equally reactive to light  CN III, IV, VI:  Extraocular Muscles full with no signs of nystagmus  CN V:  Facial sensory is symmetric with no asymmetries.  CN VII:  Facial motor symmetric  CN VIII:  Gross hearing intact bilaterally  CN IX:  Palate elevates symmetrically  CN X:  Palate elevates symmetrically  CN XI:  Shoulder shrug symmetric  CN XII:  Tongue is midline on protrusion     Motor: (strength out of 5:  1= minimal movement, 2 = movement in plane of gravity, 3 = movement against gravity, 4 = movement against some resistance, 5 = full strength)     -Right Upper Ext: Proximal: 5 Distal: 5  -Left Upper Ext: Proximal: 5   Distal: 5     -Right Lower Ext: Proximal: 5 Distal: 5  -Left Lower Ext: Proximal: 5   Distal: 5     Tone examination shows cogwheel rigidity throughout.     DTR:  -Right              Bicep: 2+         Tricep: 2+        Brachoradialis: 2+              Patella: 2+       Ankle: 2+         Neg Babinski  -Left              Bicep: 2+         Tricep: 2+        Brachoradialis: 2+              Patella: 2+       Ankle: 2+         Neg Babinski     Sensory:  -Intact to light touch, pinprick, temperature, pain, and proprioception     Coordination:  -The patient has significant resting tremors in all 4 extremities.            Results Review:    I reviewed the patient's new clinical results.    Results from last 7 days   Lab Units  11/26/18   0435  11/25/18   0609  11/24/18   194    WBC 10*3/mm3  11.07*  14.63*  16.40*   HEMOGLOBIN g/dL  11.6*  13.1  15.3   HEMATOCRIT %  36.8*  39.3  44.8   PLATELETS 10*3/mm3  156  113*  253        Results from last 7 days   Lab Units  11/26/18   0435  11/25/18   0609  11/24/18   1949   SODIUM mmol/L  142  146*  146*   POTASSIUM mmol/L  3.6  3.6  3.9   CHLORIDE mmol/L  107  111*  104   CO2 mmol/L  30.0  26.0  25.0   BUN mg/dL  25*  33*  32*   CREATININE mg/dL  0.77  0.85  1.18   CALCIUM mg/dL  8.2*  8.5  10.1   BILIRUBIN mg/dL  0.7  1.3*  1.7*   ALK PHOS U/L  65  76  107   ALT (SGPT) U/L  25  65*  36   AST (SGOT) U/L  167*  208*  159*   GLUCOSE mg/dL  77  111*  163*        Lab Results   Component Value Date    PHOS 3.6 11/25/2018    MG 2.3 (H) 11/25/2018    PROTIME 13.0 11/24/2018    INR 0.95 11/24/2018     No components found for: POCGLUC  No components found for: A1C  No results found for: HDL, LDL  No components found for: B12  No results found for: TSH    Assessment/Plan     Hospital Problem List      Non-traumatic rhabdomyolysis    Fall    Impression:  1.  Severe Parkinson's Disease  2.  Metabolic encephalopathy from UTI  3.  UTI  4.  Recent addition of Amantadine ER to medication profile    Plan:  · Patient's mental status resolved despite continued Amantadine use.  Much more likely confusion from UTI  · Offered to stop Amantadine still as family was concerned, they are OK with keeping it for now as it likely is benefiting gait and dyskinesias  · Spoke with patient and son again about the possibility of a DBS in the future.        Neil Howard MD  11/26/18  2:09 PM

## 2018-11-26 NOTE — PLAN OF CARE
Problem: Patient Care Overview  Goal: Plan of Care Review   11/26/18 0426   Coping/Psychosocial   Plan of Care Reviewed With patient   Plan of Care Review   Progress no change   OTHER   Outcome Summary Remains confused. Disoriented to time and situation. Juarez to BSD. Telemetry shows A-fib,A-flutter. No c/o discomfort. Safety maintained.

## 2018-11-26 NOTE — PLAN OF CARE
Problem: Patient Care Overview  Goal: Plan of Care Review  Outcome: Ongoing (interventions implemented as appropriate)   11/26/18 1005   Coping/Psychosocial   Plan of Care Reviewed With patient   Plan of Care Review   Progress no change   OTHER   Outcome Summary OT eval completed. Pt is alert and oriented to person, place and year. She has intention tremors of head, trunk, and UEs. Pt came to EOB with S with HOB elevated. Transferred and ambulated with CGA. She is unsteady due to h/o Parkinson's but had no LOB. She can don/doff socks at EOB with SBA. OT will continue to work with pt on home safety education. She appears to be at baseline function but reports increased falls in past year and has never had PT/OT for Parkinson's diagnosis. Anticipate discharge home when stable.

## 2018-11-26 NOTE — THERAPY TREATMENT NOTE
Acute Care - Physical Therapy Treatment Note  [facility]     Patient Name: Purnima Sutherland  : 1945  MRN: [id number]  Today's Date: 2018  Onset of Illness/Injury or Date of Surgery: 18  Date of Referral to PT: 18  Referring Physician: Yady PACK    Admit Date: 2018    Visit Dx:    ICD-10-CM ICD-9-CM   1. Fall, initial encounter W19.XXXA E888.9   2. Cystitis N30.90 595.9   3. Altered mental status, unspecified altered mental status type R41.82 780.97   4. Dysphagia, unspecified type R13.10 787.20   5. Decreased activities of daily living (ADL) R68.89 780.99   6. Impaired functional mobility, balance, gait, and endurance Z74.09 V49.89     Patient Active Problem List   Diagnosis   • Osteoporosis, post-menopausal   • Fall   • Non-traumatic rhabdomyolysis       Therapy Treatment    Rehabilitation Treatment Summary     Row Name 18 1546 18 1000          Treatment Time/Intention    Discipline  physical therapy assistant  -KJ  speech language pathologist  -MB,BISHOP,MB2     Document Type  therapy note (daily note)  -KJ2  therapy note (daily note)  -BISHOP RIVAS,MB2     Subjective Information  no complaints  -KJ2  no complaints  -BISHOP RIVAS,MB2     Mode of Treatment  physical therapy  -KJ2  individual therapy;speech-language pathology  -BISHOP RIVAS,MB2     Patient/Family Observations  --  son present  -BISHOP RIVAS,MB2     Patient Effort  good  -KJ2  good  -BISHOP RIVAS,MB2     Existing Precautions/Restrictions  fall  -KJ2  --     Recorded by [KJ] Grace Ogden, PTA 18 1556  [KJ2] Grace Ogden, PTA 18 1609 [MB,BISHOP,MB2] Carissa Gore, CCC-SLP (r) Ann Church, Speech Therapy Student (t) Carissa Gore, CCC-SLP (c) 18 1249     Row Name 18 1546             Bed Mobility Assessment/Treatment    Supine-Sit [place] (Bed Mobility)  verbal cues;contact guard  -KJ      Sit-Supine [place] (Bed Mobility)  verbal cues;contact guard  -KJ      Recorded by [KJ] Grace Ogden,  [facility] 11/26/18 1609      Row Name 11/26/18 1546             Sit-Stand Transfer    Sit-Stand [place] (Transfers)  verbal cues;contact guard;minimum assist (75% patient effort)  -KJ      Recorded by [KJ] Grace Ogden, [facility] 11/26/18 1609      Row Name 11/26/18 1546             Stand-Sit Transfer    Stand-Sit [place] (Transfers)  verbal cues;contact guard  -KJ      Recorded by [KJ] Grace Ogden, [facility] 11/26/18 1609      Row Name 11/26/18 1546             Gait/Stairs Assessment/Training    [place] Level (Gait)  contact guard;minimum assist (75% patient effort)  -KJ      Distance in Feet (Gait)  50' x 2  -KJ      Deviations/Abnormal Patterns (Gait)  scissoring gait speed increased  -KJ      Recorded by [KJ] Grace Ogden, [facility] 11/26/18 1609      [facility] 11/26/18 1546             Motor Skills Assessment/Interventions    Additional Documentation  Therapeutic Exercise (Group)  -KJ      Recorded by [KJ] Grace Ogden, [facility] 11/26/18 1609      [facility] 11/26/18 1546             Therapeutic Exercise    Exercise Type (Therapeutic Exercise)  AROM (active range of motion)  -KJ      Position (Therapeutic Exercise)  seated;standing  -KJ      Sets/Reps (Therapeutic Exercise)  10  -KJ      Recorded by [KJ] Grace Ogden, [facility] 11/26/18 1609      [facility] 11/26/18 1546             Positioning and Restraints    Pre-Treatment Position  in bed  -KJ      Post Treatment Position  bed  -KJ      Recorded by [KJ] Grace Ogden, [facility] 11/26/18 1609      [facility] 11/26/18 1000             Pain Assessment    Additional Documentation  Pain Scale: FACES Pre/Post-Treatment (Group)  -MB,MH,MB2      Recorded by [MB,MH,MB2] Carissa Gore CCC-SLP (r) Ann Church, Speech Therapy Student (t) Carissa Gore CCC-SLP (c) 11/26/18 1249      Row Name 11/26/18 1546             Pain Scale: Numbers Pre/Post-Treatment    Pain Scale: Numbers, Pretreatment  0/10 - no pain  -KJ      Recorded by [KJ] Grace Ogden, [facility] 11/26/18 1608       Row Name 11/26/18 1000             Pain Scale: FACES Pre/Post-Treatment    Pain: FACES Scale, Pretreatment  0-->no hurt  -BISHOP RIVAS MB2      Pain: FACES Scale, Post-Treatment  0-->no hurt  -BISHOP RIVAS,MB2      Recorded by [BISHOP RIVAS,MB2] Carissa Gore, [facility]-SLP (r) Ann Church, Speech Therapy Student (t) Carissa Gore, CCC-SLP (c) 11/26/18 1249      Row Name                Wound 11/24/18 2000 Right lower;lateral arm abrasion    Wound - Properties Group Date first assessed: 11/24/18 [JT] Time first assessed: 2000 [JT] Present On Admission : yes;picture taken [JT] Side: Right [JT] Orientation: lower;lateral [JT] Location: arm [JT] Type: abrasion [JT] Recorded by:  [JT] Iveth Clarke RN 11/24/18 2032    Row Name                Wound 11/24/18 2000 Right other (see comments) elbow abrasion    Wound - Properties Group Date first assessed: 11/24/18 [JT] Time first assessed: 2000 [JT] Present On Admission : yes;picture taken [JT] Side: Right [JT] Orientation: other (see comments) [JT], ELBOW  Location: elbow [JT] Type: abrasion [JT] Recorded by:  [JT] Iveth Clarke RN 11/24/18 2034    Row Name                Wound 11/24/18 2000 Right other (see comments) hip abrasion    Wound - Properties Group Date first assessed: 11/24/18 [JT] Time first assessed: 2000 [JT] Present On Admission : yes;picture taken [JT] Side: Right [JT] Orientation: other (see comments) [JT], HIP  Location: hip [JT] Type: abrasion [JT], BRUISING  Recorded by:  [JT] Iveth Clarke RN 11/24/18 2036    Row Name 11/26/18 1000             Outcome Summary/Treatment Plan (SLP)    Daily Summary of Progress (SLP)  progress toward functional goals is good  -BISHOP RIVAS,MB2      Barriers to Overall Progress (SLP)  n/a  -BISHOP RIVAS,MB2      Plan for Continued Treatment (SLP)  Continue diet tolerance  -BISHOP RIVAS MB2      Anticipated Dischage Disposition  unknown  -BISHOP RIVAS MB2      Recorded by [BISHOP RIVAS,MB2] Carissa Gore, [facility]-SLP (r) Ann Church, Speech Therapy  Student (t) Carissa Gore, CCC-SLP (c) 11/26/18 1249        User Key  (r) = Recorded By, (t) = Taken By, (c) = Cosigned By    Initials Name Effective Dates Discipline    MB Carissa Gore, CCC-SLP 08/02/16 -  SLP    Grace Jones, PTA 08/02/16 -  PT    vIeth Chacko, RN 06/13/18 -  Nurse    Ann Ritchie, Speech Therapy Student 07/30/18 -  SLP          Wound 11/24/18 2000 Right lower;lateral arm abrasion (Active)   Dressing Appearance dry;intact 11/26/2018  7:51 AM   Base clean;pink 11/26/2018  7:51 AM   Drainage Amount none 11/26/2018  7:51 AM   Dressing Care, Wound gauze 11/26/2018  7:51 AM       Wound 11/24/18 2000 Right other (see comments) elbow abrasion (Active)   Dressing Appearance dry;intact 11/26/2018  7:51 AM   Drainage Amount none 11/26/2018  7:51 AM   Dressing Care, Wound gauze 11/26/2018  7:51 AM       Wound 11/24/18 2000 Right other (see comments) hip abrasion (Active)   Dressing Appearance dry;intact 11/26/2018  7:51 AM   Drainage Amount none 11/26/2018  7:51 AM   Dressing Care, Wound gauze 11/26/2018  7:51 AM       PT Rehab Goals     Row Name 11/26/18 0849             Bed Mobility Goal 1 (PT)    Activity/Assistive Device (Bed Mobility Goal 1, PT)  bed mobility activities, all  -JE (r) TD (t) JE (c)      [place] Level/Cues Needed (Bed Mobility Goal 1, PT)  independent  -JE (r) TD (t) JE (c)      Time Frame (Bed Mobility Goal 1, PT)  by discharge  -JE (r) TD (t) JE (c)      Progress/Outcomes (Bed Mobility Goal 1, PT)  goal ongoing  -JE (r) TD (t) JE (c)         Transfer Goal 1 (PT)    Activity/Assistive Device (Transfer Goal 1, PT)  transfers, all  -JE (r) TD (t) JE (c)      [place] Level/Cues Needed (Transfer Goal 1, PT)  supervision required  -JE (r) TD (t) JE (c)      Time Frame (Transfer Goal 1, PT)  by discharge  -JE (r) TD (t) JE (c)      Progress/Outcome (Transfer Goal 1, PT)  goal ongoing  -JE (r) TD (t) JE (c)         Gait Training Goal 1 (PT)     Activity/Assistive Device (Gait Training Goal 1, PT)  gait (walking locomotion);decrease fall risk;improve balance and speed;increase endurance/gait distance;increase energy conservation;diminish gait deviation  -JE (r) TD (t) JE (c)      [place] Level (Gait Training Goal 1, PT)  standby assist  -JE (r) TD (t) JE (c)      Distance (Gait Goal 1, PT)  200'  -JE (r) TD (t) JE (c)      Time Frame (Gait Training Goal 1, PT)  by discharge  -JE (r) TD (t) JE (c)      Progress/Outcome (Gait Training Goal 1, PT)  goal ongoing  -JE (r) TD (t) JE (c)         Stairs Goal 1 (PT)    Activity/Assistive Device (Stairs Goal 1, PT)  stairs, all skills  -JE (r) TD (t) JE (c)      [place] Level/Cues Needed (Stairs Goal 1, PT)  standby assist  -JE (r) TD (t) JE (c)      Number of Stairs (Stairs Goal 1, PT)  3  -JE (r) TD (t) JE (c)      Time Frame (Stairs Goal 1, PT)  by discharge  -JE (r) TD (t) JE (c)      Progress/Outcome (Stairs Goal 1, PT)  goal ongoing  -JE (r) TD (t) JE (c)        User Key  (r) = Recorded By, (t) = Taken By, (c) = Cosigned By    Initials Name Provider Type Discipline    Milagros Zepeda, PT Physical Therapist PT    Theresa Lyons, FIFI Student PT Student PT          Physical Therapy Education     Title: PT OT SLP Therapies (Done)     Topic: Physical Therapy (Done)     Point: Mobility training (Done)     Learning Progress Summary           Patient Acceptance, E, VU,DU by ELIO at 11/26/2018 10:08 AM    Comment:  Pt was educated on benefits of PT and PT POC. Pt was educated on safety precautions and body mechanics with transfers and ambulation. Pt was educated on gait pattern. Pt encouraged to call for assist when needed.                   Point: Body mechanics (Done)     Learning Progress Summary           Patient Acceptance, E, VU,DU by ELIO at 11/26/2018 10:08 AM    Comment:  Pt was educated on benefits of PT and PT POC. Pt was educated on safety precautions and body mechanics with transfers and  ambulation. Pt was educated on gait pattern. Pt encouraged to call for assist when needed.                   Point: Precautions (Done)     Learning Progress Summary           Patient Acceptance, PRADEEP DIONI NORRIS by TD at 11/26/2018 10:08 AM    Comment:  Pt was educated on benefits of PT and PT POC. Pt was educated on safety precautions and body mechanics with transfers and ambulation. Pt was educated on gait pattern. Pt encouraged to call for assist when needed.                               User Key     Initials Effective Dates Name Provider Type Discipline    TD 09/07/18 -  Theresa Wu, PT Student PT Student PT                PT Recommendation and Plan        Outcome Measures     Row Name 11/26/18 1007 11/26/18 1003          How much help from another person do you currently need...    Turning from your back to your side while in flat bed without using bedrails?  4  -JE (r) TD (t) JE (c)  --     Moving from lying on back to sitting on the side of a flat bed without bedrails?  4  -JE (r) TD (t) JE (c)  --     Moving to and from a bed to a chair (including a wheelchair)?  3  -JE (r) TD (t) JE (c)  --     Standing up from a chair using your arms (e.g., wheelchair, bedside chair)?  4  -JE (r) TD (t) JE (c)  --     Climbing 3-5 steps with a railing?  3  -JE (r) TD (t) JE (c)  --     To walk in hospital room?  3  -JE (r) TD (t) JE (c)  --     AM-PAC 6 Clicks Score  21  -JE (r) TD (t)  --        How much help from another is currently needed...    Putting on and taking off regular lower body clothing?  --  3  -AC     Bathing (including washing, rinsing, and drying)  --  3  -AC     Toileting (which includes using toilet bed pan or urinal)  --  3  -AC     Putting on and taking off regular upper body clothing  --  4  -AC     Taking care of personal grooming (such as brushing teeth)  --  4  -AC     Eating meals  --  4  -AC     Score  --  21  -AC        Functional Assessment    Outcome Measure Options  AM-PAC 6 Clicks Basic  Mobility (PT)  -JE (r) TD (t) JE (c)  AM-PAC 6 Clicks Daily Activity (OT)  -AC       User Key  (r) = Recorded By, (t) = Taken By, (c) = Cosigned By    Initials Name Provider Type    AC Sam Liu, OTR/L Occupational Therapist    Milagros Zepeda, PT Physical Therapist    Theresa Lyons, PT Student PT Student         Time Calculation:   PT Charges     Row Name 11/26/18 1609 11/26/18 1009          Time Calculation    Start Time  1546  -KJ  0926 chart review: 0824-0836  -JE (r) TD (t) JE (c)     Stop Time  1609  -KJ  0952  -JE (r) TD (t) JE (c)     Time Calculation (min)  23 min  -KJ  26 min  -JE (r) TD (t)     PT Received On  11/26/18  -KJ  11/26/18  -JE (r) TD (t) JE (c)     PT Goal Re-Cert Due Date  12/06/18  -KJ  12/06/18  -JE (r) TD (t) JE (c)        Time Calculation- PT    Total Timed Code Minutes- PT  23 minute(s)  -KJ  --       User Key  (r) = Recorded By, (t) = Taken By, (c) = Cosigned By    Initials Name Provider Type    Grace Jones, PTA Physical Therapy Assistant    Milagros Zepeda, PT Physical Therapist    Theresa Lyons, PT Student PT Student        Therapy Suggested Charges     Code   Minutes Charges    None           Therapy Charges for Today     Code Description Service Date Service Provider Modifiers Qty    57538981490 HC GAIT TRAINING EA 15 MIN 11/26/2018 Grace Ogden, PTA GP, KX 1    65099959940 HC PT THER PROC EA 15 MIN 11/26/2018 Grace Ogden, PTA GP, KX 1          PT G-Codes  PT Professional Judgement Used?: Yes  Outcome Measure Options: AM-PAC 6 Clicks Basic Mobility (PT)  AM-PAC 6 Clicks Score: 21  Score: 21  Functional Limitation: Mobility: Walking and moving around  Mobility: Walking and Moving Around Current Status (): At least 20 percent but less than 40 percent impaired, limited or restricted  Mobility: Walking and Moving Around Goal Status (): At least 1 percent but less than 20 percent impaired, limited or restricted    Grace Ogden,  PTA  11/26/2018

## 2018-11-26 NOTE — THERAPY TREATMENT NOTE
"Acute Care - Speech Language Pathology   Swallow Treatment Note [facility]     Patient Name: Purnima Sutherland  : 1945  MRN: [id number]  Today's Date: 2018  Onset of Illness/Injury or Date of Surgery: 18     Referring Physician: Yady PACK      Admit Date: 2018  Pt was upright in chair with son present in room. Pt was conversing fluently and oriented to person, place, and time. She states that she is back to her basline, and son states that \"she is 100x better\". No slc eval is needed. Pt stated that her slurred speech is dt Parkinsons disease and also at baseline. Diet tolerance was assessed with thin liquids and mech soft. Pt had no overt s/s of aspiration. Continue diet tolerance. If any changes in mental status, notify SLP to do eval.  Ann Church Speech Therapy Student  2018  10:44 AM  Visit Dx:      ICD-10-CM ICD-9-CM   1. Fall, initial encounter W19.XXXA E888.9   2. Cystitis N30.90 595.9   3. Altered mental status, unspecified altered mental status type R41.82 780.97   4. Dysphagia, unspecified type R13.10 787.20   5. Decreased activities of daily living (ADL) R68.89 780.99   6. Impaired functional mobility, balance, gait, and endurance Z74.09 V49.89     Patient Active Problem List   Diagnosis   • Osteoporosis, post-menopausal   • Fall   • Non-traumatic rhabdomyolysis       Therapy Treatment  Rehabilitation Treatment Summary     Row Name 18 1000             Treatment Time/Intention    Discipline  speech language pathologist  (Pended)   -      Document Type  therapy note (daily note)  (Pended)   -      Subjective Information  no complaints  (Pended)   -      Mode of Treatment  individual therapy;speech-language pathology  (Pended)   -      Patient/Family Observations  son present  (Pended)   -      Patient Effort  good  (Pended)   -      Recorded by [] Ann Church, Speech Therapy Student 18 1037      Row Name 18 1000             Pain " Assessment    Additional Documentation  Pain Scale: FACES Pre/Post-Treatment (Group)  (Pended)   -      Recorded by [] Ann Church, Speech Therapy Student 11/26/18 1037      Row Name 11/26/18 1000             Pain Scale: FACES Pre/Post-Treatment    Pain: FACES Scale, Pretreatment  0-->no hurt  (Pended)   -      Pain: FACES Scale, Post-Treatment  0-->no hurt  (Pended)   -      Recorded by [] Ann Church, Speech Therapy Student 11/26/18 1037      Row Name                Wound 11/24/18 2000 Right lower;lateral arm abrasion    Wound - Properties Group Date first assessed: 11/24/18 [JT] Time first assessed: 2000 [JT] Present On Admission : yes;picture taken [JT] Side: Right [JT] Orientation: lower;lateral [JT] Location: arm [JT] Type: abrasion [JT] Recorded by:  [JT] Iveth Clarke RN 11/24/18 2032    Row Name                Wound 11/24/18 2000 Right other (see comments) elbow abrasion    Wound - Properties Group Date first assessed: 11/24/18 [JT] Time first assessed: 2000 [JT] Present On Admission : yes;picture taken [JT] Side: Right [JT] Orientation: other (see comments) [JT], ELBOW  Location: elbow [JT] Type: abrasion [JT] Recorded by:  [JT] Iveth Clarke RN 11/24/18 2034    Row Name                Wound 11/24/18 2000 Right other (see comments) hip abrasion    Wound - Properties Group Date first assessed: 11/24/18 [JT] Time first assessed: 2000 [JT] Present On Admission : yes;picture taken [JT] Side: Right [JT] Orientation: other (see comments) [JT], HIP  Location: hip [JT] Type: abrasion [JT], BRUISING  Recorded by:  [JT] Iveth Clarke RN 11/24/18 2036    Row Name 11/26/18 1000             Outcome Summary/Treatment Plan (SLP)    Daily Summary of Progress (SLP)  progress toward functional goals is good  (Pended)   -      Barriers to Overall Progress (SLP)  n/a  (Pended)   -      Plan for Continued Treatment (SLP)  Continue diet tolerance  (Pended)   -      Anticipated Dischage  Disposition  unknown  (Pended)   -      Recorded by [] Ann Church, Speech Therapy Student 11/26/18 1037        User Key  (r) = Recorded By, (t) = Taken By, (c) = Cosigned By    Initials Name Effective Dates Discipline    Iveth Chacko RN 06/13/18 -  Nurse    Ann Ritchie, Speech Therapy Student 07/30/18 -  SLP          Outcome Summary  Outcome Summary/Treatment Plan (SLP)  Daily Summary of Progress (SLP): (P) progress toward functional goals is good (11/26/18 1000 : Ann Church, Speech Therapy Student)  Barriers to Overall Progress (SLP): (P) n/a (11/26/18 1000 : Ann Church, Speech Therapy Student)  Plan for Continued Treatment (SLP): (P) Continue diet tolerance (11/26/18 1000 : Ann Church, Speech Therapy Student)  Anticipated Dischage Disposition: (P) unknown (11/26/18 1000 : Ann Church, Speech Therapy Student)      SLP GOALS     Row Name 11/26/18 1000 11/25/18 0653          Oral Nutrition/Hydration Goal 1 (SLP)    Oral Nutrition/Hydration Goal 1, SLP  LTG: Patient will tolerate LRD without s/s of aspiration.  (Pended)   -  LTG: Patient will tolerate LRD without s/s of aspiration.  -     Time Frame (Oral Nutrition/Hydration Goal 1, SLP)  by discharge  (Pended)   -  by discharge  -MM     Barriers (Oral Nutrition/Hydration Goal 1, SLP)  n/a  (Pended)   -  n/a  -MM     Progress/Outcomes (Oral Nutrition/Hydration Goal 1, SLP)  continuing progress toward goal  (Pended)   -  goal ongoing  -MM       User Key  (r) = Recorded By, (t) = Taken By, (c) = Cosigned By    Initials Name Provider Type    MM Shae Barrios, MS, CFY-SLP Speech and Language Pathologist    Ann Ritchie, Speech Therapy Student Speech Therapy Student          EDUCATION  The patient has been educated in the following areas:   Dysphagia (Swallowing Impairment).    SLP Recommendation and Plan                       Anticipated Dischage Disposition: (P) unknown                Plan of Care Reviewed  "With: (P) patient, son  Plan of Care Review  Plan of Care Reviewed With: (P) patient, son  Daily Summary of Progress (SLP): (P) progress toward functional goals is good  Plan for Continued Treatment (SLP): (P) Continue diet tolerance  Progress: (P) no change  Outcome Summary: (P) Pt was upright in chair with son present in room. Pt was conversing fluently and oriented to person, place, and time. She states that she is back to her basline, and son states that \"she is 100x better\". No slc eval is needed. Pt stated that her slurred speech is dt Parkinsons disease and also at baseline. Diet tolerance was assessed with thin liquids and mech soft. Pt had no overt s/s of aspiration. Continue diet tolerance. If any changes in mental status, notify SLP to do eval.       SLP Outcome Measures (last 72 hours)      SLP Outcome Measures     Row Name 11/25/18 0700             SLP Outcome Measures    Outcome Measure Used?  Adult NOMS  -MM         Adult FCM Scores    FCM Chosen  Swallowing  -MM      Swallowing FCM Score  5  -MM        User Key  (r) = Recorded By, (t) = Taken By, (c) = Cosigned By    Initials Name Effective Dates     Shae Barrios, MS, CFY-SLP 07/03/18 -              Time Calculation:   Time Calculation- SLP     Row Name 11/26/18 1043             Time Calculation- [facility]    SLP Start Time  1000  (Pended)   -      SLP Stop Time  1014  (Pended)   -      SLP Time Calculation (min)  14 min  (Pended)   -      SLP Received On  11/26/18  (Pended)   -        User Key  (r) = Recorded By, (t) = Taken By, (c) = Cosigned By    Initials Name Provider Type     Ann Church, Speech Therapy Student Speech Therapy Student          Therapy Charges for Today     Code Description Service Date Service Provider Modifiers Qty    94799811589 HC ST TREATMENT SWALLOW 1 11/26/2018 Ann Church, Speech Therapy Student CECILIA MIDDLETON 1          SLP G-Codes  SLP NOMS Used?: Yes  Functional Limitations: Swallowing  Swallow Current " Status (): At least 20 percent but less than 40 percent impaired, limited or restricted  Swallow Goal Status (): At least 1 percent but less than 20 percent impaired, limited or restricted      Ann Church, Speech Therapy Student  11/26/2018

## 2018-11-26 NOTE — PLAN OF CARE
"Problem: Patient Care Overview  Goal: Plan of Care Review  Outcome: Ongoing (interventions implemented as appropriate)   11/26/18 1036   Coping/Psychosocial   Plan of Care Reviewed With patient;son   Plan of Care Review   Progress no change   OTHER   Outcome Summary Pt was upright in chair with son present in room. Pt was conversing fluently and oriented to person, place, and time. She states that she is back to her basline, and son states that \"she is 100x better\". No slc eval is needed. Pt stated that her slurred speech is dt Parkinsons disease and also at baseline. Diet tolerance was assessed with thin liquids and mech soft. Pt had no overt s/s of aspiration. Continue diet tolerance. If any changes in mental status, notify SLP to do eval.         "

## 2018-11-26 NOTE — PLAN OF CARE
Problem: Patient Care Overview  Goal: Plan of Care Review  Outcome: Ongoing (interventions implemented as appropriate)   11/25/18 4953   Coping/Psychosocial   Plan of Care Reviewed With patient   Plan of Care Review   Progress no change   OTHER   Outcome Summary A&Ox3 this shift. F/c placed this shift to monitor accurate output. Pt tolerating soft texture + thin liquid diets. Pt had 5 seconds of PAT's with . Dr. Mancilla called. Continue to monitor. Tele on. UA sent, lab called to add on culture. Waiting for results. Safety maintained. Son @ bedside.     Goal: Individualization and Mutuality  Outcome: Ongoing (interventions implemented as appropriate)    Goal: Discharge Needs Assessment  Outcome: Ongoing (interventions implemented as appropriate)      Problem: Fall Risk (Adult)  Goal: Absence of Fall  Outcome: Ongoing (interventions implemented as appropriate)      Problem: Confusion, Acute (Adult)  Goal: Cognitive/Functional Impairments Minimized  Outcome: Ongoing (interventions implemented as appropriate)    Goal: Safety  Outcome: Ongoing (interventions implemented as appropriate)

## 2018-11-26 NOTE — THERAPY EVALUATION
Acute Care - Occupational Therapy Initial Evaluation  [facility]     Patient Name: Purnima Sutherland  : 1945  MRN: [id number]  Today's Date: 2018  Onset of Illness/Injury or Date of Surgery: 18  Date of Referral to OT: 18  Referring Physician: Yady PACK    Admit Date: 2018       ICD-10-CM ICD-9-CM   1. Fall, initial encounter W19.XXXA E888.9   2. Cystitis N30.90 595.9   3. Altered mental status, unspecified altered mental status type R41.82 780.97   4. Dysphagia, unspecified type R13.10 787.20   5. Decreased activities of daily living (ADL) R68.89 780.99     Patient Active Problem List   Diagnosis   • Osteoporosis, post-menopausal   • Fall   • Non-traumatic rhabdomyolysis     Past Medical History:   Diagnosis Date   • Depression    • Disease of thyroid gland    • Parkinson disease (CMS/HCC)      Past Surgical History:   Procedure Laterality Date   • CHOLECYSTECTOMY     • CYST REMOVAL            OT ASSESSMENT FLOWSHEET (last 72 hours)      Occupational Therapy Evaluation     Row Name 18 0929                   OT Evaluation Time/Intention    Subjective Information  complains of;fatigue;pain  -AC        Document Type  evaluation  -AC        Mode of Treatment  occupational therapy  -AC        Patient Effort  good  -AC           General Information    Patient Profile Reviewed?  yes  -AC        Onset of Illness/Injury or Date of Surgery  18  -        Referring Physician  Yady PACK  -        Patient Observations  alert;cooperative;agree to therapy  -AC        Patient/Family Observations  no family present  -        General Observations of Patient  lying in fowlers in bed, no apparent distress  -AC        Prior Level of Function  independent:;all household mobility;community mobility;gait;transfer;ADL's;home management;cooking;cleaning;driving;shopping  -AC        Equipment Currently Used at Home  walker, rolling;cane, quad;shower chair  -AC        Pertinent  History of Current Functional Problem  cc: altered mental status. Dx: UTI, leukocytosis, hypernatremia, rhabdomyolysis. PMH: depression, disease of thyroid, Parkinson's disease.   -AC        Existing Precautions/Restrictions  fall  -AC        Risks Reviewed  patient:;LOB;nausea/vomiting;dizziness;increased discomfort;change in vital signs;lines disloged  -AC        Benefits Reviewed  patient:;improve function;increase independence;increase strength;increase balance;decrease pain;decrease risk of DVT;improve skin integrity;increase knowledge  -AC        Barriers to Rehab  none identified  -           Relationship/Environment    Lives With  alone  -AC        Family Caregiver if Needed  child(yris), adult children call daily  -           Resource/Environmental Concerns    Current Living Arrangements  home/apartment/condo  -           Home Main Entrance    Number of Stairs, Main Entrance  three  -AC        Stair Railings, Main Entrance  railing on right side (ascending)  -           Cognitive Assessment/Interventions    Additional Documentation  Cognitive Assessment/Intervention (Group)  -           Cognitive Assessment/Intervention- PT/OT    Orientation Status (Cognition)  oriented x 4  -AC        Follows Commands (Cognition)  WNL  -AC        Personal Safety Interventions  elopement precautions initiated;fall prevention program maintained;gait belt;muscle strengthening facilitated;nonskid shoes/slippers when out of bed;supervised activity  -AC           Safety Issues, Functional Mobility    Impairments Affecting Function (Mobility)  balance;motor control;shortness of breath;endurance/activity tolerance  -           Bed Mobility Assessment/Treatment    Bed Mobility Assessment/Treatment  supine-sit;scooting/bridging  -        Scooting/Bridging [place] (Bed Mobility)  supervision  -        Supine-Sit [place] (Bed Mobility)  contact guard;verbal cues  -        Assistive Device (Bed Mobility)   head of bed elevated  -           Functional Mobility    Functional Mobility- Ind. Level  contact guard assist  -        Functional Mobility- Comment  in hallway, back to chair  -           Transfer Assessment/Treatment    Transfer Assessment/Treatment  sit-stand transfer;stand-sit transfer  -           Sit-Stand Transfer    Sit-Stand [place] (Transfers)  contact guard;verbal cues  -           Stand-Sit Transfer    Stand-Sit [place] (Transfers)  contact guard;verbal cues  -AC           ADL Assessment/Intervention    BADL Assessment/Intervention  lower body dressing  -           Lower Body Dressing Assessment/Training    Lower Body Dressing [place] Level  don;doff;socks;supervision  -        Lower Body Dressing Position  edge of bed sitting  -AC           BADL Safety/Performance    Impairments, BADL Safety/Performance  balance;motor control  -           General ROM    GENERAL ROM COMMENTS  WFL AROM BUE except R shoulder limited by 50% with flexion, abduction  -           MMT (Manual Muscle Testing)    General MMT Comments  5/5 BUE except R shoulder not tested  -           Motor Assessment/Interventions    Additional Documentation  Balance (Group);Gross Motor Coordination (Group)  -           Gross Motor Coordination    Gross Motor Impairments  motor control;coordination;finger to nose  -        Gross Motor Skill, Impairments Detail  slight impairment finger to nose B; pt has writhing movements of trunk and head with impaired motor control/tremors in B UE  -AC           Balance    Balance  static sitting balance;static standing balance;dynamic sitting balance;dynamic standing balance  -           Static Sitting Balance    Level of [place] (Unsupported Sitting, Static Balance)  independent  -        Sitting Position (Unsupported Sitting, Static Balance)  sitting on edge of bed  -           Dynamic Sitting Balance    Level of [place], Reaches Outside Midline  (Sitting, Dynamic Balance)  standby assist  -AC        Sitting Position, Reaches Outside Midline (Sitting, Dynamic Balance)  sitting on edge of bed  -AC           Static Standing Balance    Level of [place] (Supported Standing, Static Balance)  contact guard assist  -AC           Dynamic Standing Balance    Level of [place], Reaches Outside Midline (Standing, Dynamic Balance)  contact guard assist  -AC           Fine Motor Testing & Training    Comment, Fine Motor Coordination  opposition intact B  -AC           Sensory Assessment/Intervention    Sensory General Assessment  no sensation deficits identified  -AC           Positioning and Restraints    Pre-Treatment Position  in bed  -AC        Post Treatment Position  chair  -AC        In Chair  sitting;call light within reach;encouraged to call for assist;with family/caregiver  -AC           Pain Assessment    Additional Documentation  Pain Scale: Numbers Pre/Post-Treatment (Group)  -AC           Pain Scale: Numbers Pre/Post-Treatment    Pain Scale: Numbers, Pretreatment  2/10  -AC        Pain Location - Side  Right  -AC        Pain Location  shoulder  -AC        Pain Intervention(s)  Repositioned;Ambulation/increased activity  -AC           Wound 11/24/18 2000 Right lower;lateral arm abrasion    Wound - Properties Group Date first assessed: 11/24/18  -JT Time first assessed: 2000  -JT Present On Admission : yes;picture taken  -JT Side: Right  -JT Orientation: lower;lateral  -JT Location: arm  -JT Type: abrasion  -JT       Wound 11/24/18 2000 Right other (see comments) elbow abrasion    Wound - Properties Group Date first assessed: 11/24/18  -JT Time first assessed: 2000  -JT Present On Admission : yes;picture taken  -JT Side: Right  -JT Orientation: other (see comments)  -JT, ELBOW  Location: elbow  -JT Type: abrasion  -JT       Wound 11/24/18 2000 Right other (see comments) hip abrasion    Wound - Properties Group Date first assessed: 11/24/18  -JT Time  first assessed: 2000  -JT Present On Admission : yes;picture taken  -JT Side: Right  -JT Orientation: other (see comments)  -JT, HIP  Location: hip  -JT Type: abrasion  -JT, BRUISING        Plan of Care Review    Plan of Care Reviewed With  patient  -AC           Clinical Impression (OT)    Date of Referral to OT  11/25/18  -AC        OT Diagnosis  decreased adl  -AC        Prognosis (OT Eval)  good  -AC        Criteria for Skilled Therapeutic Interventions Met (OT Eval)  yes;treatment indicated  -AC        Rehab Potential (OT Eval)  good, to achieve stated therapy goals  -AC        Therapy Frequency (OT Eval)  3 times/wk  -AC        Predicted Duration of Therapy Intervention (Therapy Eval)  10 days  -AC        Care Plan Review (OT)  evaluation/treatment results reviewed;care plan/treatment goals reviewed;risks/benefits reviewed;current/potential barriers reviewed;patient/other agree to care plan  -AC        Anticipated Discharge Disposition (OT)  home  -AC           Planned OT Interventions    Planned Therapy Interventions (OT Eval)  activity tolerance training;BADL retraining;functional balance retraining;occupation/activity based interventions;patient/caregiver education/training;transfer/mobility retraining  -AC           Patient Education Goal (OT)    Activity (Patient Education Goal, OT)  home safety  -AC        Bell/Cues/Accuracy (Memory Goal 2, OT)  demonstrates adequately;independent;verbalizes understanding  -AC        Time Frame (Patient Education Goal, OT)  long term goal (LTG);10 days  -AC        Progress/Outcome (Patient Education Goal, OT)  goal ongoing  -AC           Living Environment    Home Accessibility  stairs to enter home;tub/shower is not walk in  -AC          User Key  (r) = Recorded By, (t) = Taken By, (c) = Cosigned By    Initials Name Effective Dates    AC Sam Liu, OTR/L 06/22/15 -     JT Iveth Clarke RN 06/13/18 -          Occupational Therapy Education     Title:  PT OT SLP Therapies (Done)     Topic: Occupational Therapy (Done)     Point: ADL training (Done)     Description: Instruct learner(s) on proper safety adaptation and remediation techniques during self care or transfers.   Instruct in proper use of assistive devices.    Learning Progress Summary           Patient Acceptance, E,TB, VU by  at 11/26/2018 10:04 AM    Comment:  OT POC, benefits of activity                               User Key     Initials Effective Dates Name Provider Type Discipline     06/22/15 -  Sam Liu, OTR/L Occupational Therapist OT                  OT Recommendation and Plan  Outcome Summary/Treatment Plan (OT)  Anticipated Discharge Disposition (OT): home  Planned Therapy Interventions (OT Eval): activity tolerance training, BADL retraining, functional balance retraining, occupation/activity based interventions, patient/caregiver education/training, transfer/mobility retraining  Therapy Frequency (OT Eval): 3 times/wk  Plan of Care Review  Plan of Care Reviewed With: patient  Plan of Care Reviewed With: patient  Outcome Summary: OT eval completed.  Pt is alert and oriented to person, place and year.  She has intention tremors of head, trunk, and UEs.  Pt came to EOB with S with HOB elevated.  Transferred and ambulated with CGA.  She is unsteady due to h/o Parkinson's but had no LOB.  She can don/doff socks at EOB with SBA.  OT will continue to work with pt on home safety education.  She appears to be at baseline function but reports increased falls in past year and has never had PT/OT for Parkinson's diagnosis.  Anticipate discharge home when stable.     Outcome Measures     Row Name 11/26/18 1003             How much help from another is currently needed...    Putting on and taking off regular lower body clothing?  3  -AC      Bathing (including washing, rinsing, and drying)  3  -AC      Toileting (which includes using toilet bed pan or urinal)  3  -AC      Putting on and taking  off regular upper body clothing  4  -AC      Taking care of personal grooming (such as brushing teeth)  4  -AC      Eating meals  4  -AC      Score  21  -AC         Functional Assessment    Outcome Measure Options  AM-PAC 6 Clicks Daily Activity (OT)  -AC        User Key  (r) = Recorded By, (t) = Taken By, (c) = Cosigned By    Initials Name Provider Type     Sam Liu, OTR/L Occupational Therapist          Time Calculation:   Time Calculation- OT     Row Name 11/26/18 1004             Time Calculation- OT    OT Start Time  0925  -AC      OT Stop Time  1004  -AC      OT Time Calculation (min)  39 min  -AC      OT Received On  11/26/18  -      OT Goal Re-Cert Due Date  12/06/18  -        User Key  (r) = Recorded By, (t) = Taken By, (c) = Cosigned By    Initials Name Provider Type    Sam Jules, OTR/L Occupational Therapist        Therapy Suggested Charges     Code   Minutes Charges    None           Therapy Charges for Today     Code Description Service Date Service Provider Modifiers Qty    97606557463 HC OT SELFCARE CURRENT 11/26/2018 Sam Liu OTR/L GO, CJ 1    91977602155 HC OT SELFCARE PROJECTED 11/26/2018 Sam Liu, OTR/L GO, CI 1    [id number]  OT EVAL LOW COMPLEXITY 3 11/26/2018 Sam Liu OTR/L GO, KX 1          OT G-codes  OT Professional Judgement Used?: Yes  OT Functional Scales Options: AM-PAC 6 Clicks Daily Activity (OT)  Score: 21  Functional Limitation: Self care  Self Care Current Status (): At least 20 percent but less than 40 percent impaired, limited or restricted  Self Care Goal Status (): At least 1 percent but less than 20 percent impaired, limited or restricted    Sam Liu OTR/L  11/26/2018

## 2018-11-26 NOTE — THERAPY EVALUATION
Acute Care - Physical Therapy Initial Evaluation  [facility]     Patient Name: Purnima Sutherland  : 1945  MRN: [id number]  Today's Date: 2018   Onset of Illness/Injury or Date of Surgery: 18  Date of Referral to PT: 18  Referring Physician: Yady PACK      Admit Date: 2018    Visit Dx:     ICD-10-CM ICD-9-CM   1. Fall, initial encounter W19.XXXA E888.9   2. Cystitis N30.90 595.9   3. Altered mental status, unspecified altered mental status type R41.82 780.97   4. Dysphagia, unspecified type R13.10 787.20   5. Decreased activities of daily living (ADL) R68.89 780.99   6. Impaired functional mobility, balance, gait, and endurance Z74.09 V49.89     Patient Active Problem List   Diagnosis   • Osteoporosis, post-menopausal   • Fall   • Non-traumatic rhabdomyolysis     Past Medical History:   Diagnosis Date   • Depression    • Disease of thyroid gland    • Parkinson disease (CMS/HCC)      Past Surgical History:   Procedure Laterality Date   • CHOLECYSTECTOMY     • CYST REMOVAL          PT ASSESSMENT (last 12 hours)      Physical Therapy Evaluation     Row Name 18 0849          PT Evaluation Time/Intention    Subjective Information  complains of;fatigue;pain  -JE (r) TD (t) JE (c)     Document Type  evaluation  -JE (r) TD (t) JE (c)     Mode of Treatment  physical therapy  -JE (r) TD (t) JE (c)     Patient Effort  good  -JE (r) TD (t) JE (c)     Symptoms Noted During/After Treatment  fatigue;shortness of breath  -JE (r) TD (t) JE (c)     Row Name 18 0849          General Information    Patient Profile Reviewed?  yes  -JE (r) TD (t) JE (c)     Onset of Illness/Injury or Date of Surgery  18  -JE (r) TD (t) JE (c)     Referring Physician  SIRENA Sharma  -JE (r) TD (t) JE (c)     Patient Observations  alert;cooperative;agree to therapy  -JE (r) TD (t) JE (c)     Patient/Family Observations  no family present  -JE (r) TD (t) JE (c)     General Observations of  Patient  lying in fowlers in bed, catheter   -JE (r) TD (t) JE (c)     Prior Level of Function  independent:;all household mobility;community mobility;gait;transfer;ADL's;home management;cooking;cleaning;driving  -JE (r) TD (t) JE (c)     Equipment Currently Used at Home  cane, quad;walker, rolling;shower chair  -JE (r) TD (t) JE (c)     Pertinent History of Current Functional Problem  cc: altered mental status. Dx: UTI, leukocytosis, hypernatremia, rhabdomyolysis. PMH: depression, disease of thyroid, Parkinson's disease.   -JE (r) TD (t) JE (c)     Existing Precautions/Restrictions  fall  -JE (r) TD (t) JE (c)     Risks Reviewed  patient:;LOB;nausea/vomiting;dizziness;increased discomfort;change in vital signs;increased drainage;lines disloged  -JE (r) TD (t) JE (c)     Benefits Reviewed  patient:;improve function;increase independence;increase strength;increase balance;decrease pain;decrease risk of DVT;improve skin integrity;increase knowledge  -JE (r) TD (t) JE (c)     Barriers to Rehab  none identified  -JE (r) TD (t) JE (c)     Row Name 11/26/18 0849          Relationship/Environment    Lives With  alone  -JE (r) TD (t) JE (c)     Family Caregiver if Needed  child(yris), adult children call daily  -JE (r) TD (t) JE (c)     Row Name 11/26/18 0849          Resource/Environmental Concerns    Current Living Arrangements  home/apartment/condo  -JE (r) TD (t) JE (c)     Row Name 11/26/18 0849          Home Main Entrance    Number of Stairs, Main Entrance  three  -JE (r) TD (t) JE (c)     Stair Railings, Main Entrance  railing on right side (ascending)  -JE (r) TD (t) JE (c)     Row Name 11/26/18 0849          Cognitive Assessment/Interventions    Additional Documentation  Cognitive Assessment/Intervention (Group)  -JE (r) TD (t) JE (c)     Row Name 11/26/18 0849          Cognitive Assessment/Intervention- PT/OT    Affect/Mental Status (Cognitive)  WFL  -JE (r) TD (t) JE (c)     Orientation Status (Cognition)   oriented x 4  -JE (r) TD (t) JE (c)     Follows Commands (Cognition)  WFL  -JE (r) TD (t) JE (c)     Cognitive Function (Cognitive)  WFL  -JE (r) TD (t) JE (c)     Personal Safety Interventions  elopement precautions initiated;fall prevention program maintained;gait belt;muscle strengthening facilitated;nonskid shoes/slippers when out of bed;supervised activity  -JE (r) TD (t) JE (c)     Row Name 11/26/18 0849          Safety Issues, Functional Mobility    Impairments Affecting Function (Mobility)  balance;endurance/activity tolerance;strength;range of motion (ROM);pain;motor control;shortness of breath  -JE (r) TD (t) JE (c)     Row Name 11/26/18 0849          Bed Mobility Assessment/Treatment    Bed Mobility Assessment/Treatment  supine-sit;scooting/bridging  -JE (r) TD (t) JE (c)     Scooting/Bridging Clinch (Bed Mobility)  supervision  -JE (r) TD (t) JE (c)     Supine-Sit Clinch (Bed Mobility)  contact guard;verbal cues  -JE (r) TD (t) JE (c)     Assistive Device (Bed Mobility)  head of bed elevated  -JE (r) TD (t) JE (c)     Row Name 11/26/18 0849          Transfer Assessment/Treatment    Transfer Assessment/Treatment  sit-stand transfer;stand-sit transfer  -JE (r) TD (t) JE (c)     Sit-Stand Clinch (Transfers)  contact guard;verbal cues  -JE (r) TD (t) JE (c)     Stand-Sit Clinch (Transfers)  contact guard;verbal cues  -JE (r) TD (t) JE (c)     Row Name 11/26/18 0849          Gait/Stairs Assessment/Training    Gait/Stairs Assessment/Training  gait/ambulation independence  -JE (r) TD (t) JE (c)     Clinch Level (Gait)  contact guard  -JE (r) TD (t) JE (c)     Distance in Feet (Gait)  75'  -JE (r) TD (t) JE (c)     Pattern (Gait)  step-through  -JE (r) TD (t) JE (c)     Deviations/Abnormal Patterns (Gait)  scissoring;base of support, narrow gait speed increased  -JE (r) TD (t) JE (c)     Bilateral Gait Deviations  -- Looks down at floor.   -JE (r) TD (t) JE (c)     Comment  (Gait/Stairs)  Pt ambulated ~75' w/ CGA and verbal cues. Demo'd narrow KAYLEY, scissoring gait pattern w/ increased speed requiring vc to slow down.   -JE (r) TD (t) JE (c)     Row Name 11/26/18 0849          General ROM    GENERAL ROM COMMENTS  WFL AROM BUE except R shoulder limited by 50% with flexion, abduction. BLE AROM WFL  -JE (r) TD (t) JE (c)     Row Name 11/26/18 0849          MMT (Manual Muscle Testing)    General MMT Comments  BUE 5/5 except R shoulder not tested. RLE strength 4+/5, LLE strength 3+/5 for hip flexion and knee extension w/ pain noted in L quad. 5/5 for ankle  -JE (r) TD (t) JE (c)     Row Name 11/26/18 0849          Motor Assessment/Intervention    Additional Documentation  Fine Motor Testing & Training (Group);Gross Motor Coordination (Group);Balance (Group)  -JE (r) TD (t) JE (c)     Row Name 11/26/18 0849          Gross Motor Coordination    Gross Motor Impairments  finger to nose;rapid alternating  -JE (r) TD (t) JE (c)     Gross Motor Skill, Impairments Detail  slight impairment finger to nose B; pt has writhing movements of trunk and head with impaired motor control/tremors in B UE  -JE (r) TD (t) JE (c)     Row Name 11/26/18 0849          Balance    Balance  static sitting balance;static standing balance;dynamic sitting balance;dynamic standing balance  -JE (r) TD (t) JE (c)     Row Name 11/26/18 0849          Static Sitting Balance    Level of [place] (Unsupported Sitting, Static Balance)  independent  -JE (r) TD (t) JE (c)     Sitting Position (Unsupported Sitting, Static Balance)  sitting on edge of bed  -JE (r) TD (t) JE (c)     Row Name 11/26/18 0849          Dynamic Sitting Balance    Level of [place], Reaches Outside Midline (Sitting, Dynamic Balance)  standby assist  -JE (r) TD (t) JE (c)     Sitting Position, Reaches Outside Midline (Sitting, Dynamic Balance)  sitting on edge of bed  -JE (r) TD (t) JE (c)     Row Name 11/26/18 0849          Static Standing Balance     Level of [place] (Supported Standing, Static Balance)  contact guard assist  -JE (r) TD (t) JE (c)     [facility] Name 11/26/18 0849          Dynamic Standing Balance    Level of [place], Reaches Outside Midline (Standing, Dynamic Balance)  contact guard assist  -JE (r) TD (t) JE (c)     Row Name 11/26/18 0849          Fine Motor Testing & Training    Fine Motor Tests  --  -JE (r) TD (t) JE (c)     Comment, Fine Motor Coordination  Opposition intact B  -JE (r) TD (t) JE (c)     Row Name 11/26/18 0849          Sensory Assessment/Intervention    Sensory General Assessment  no sensation deficits identified  -JE (r) TD (t) JE (c)     Row Name 11/26/18 0849          Pain Scale: Numbers Pre/Post-Treatment    Pain Scale: Numbers, Pretreatment  2/10  -JE (r) TD (t) JE (c)     Pain Location - Side  Right  -JE (r) TD (t) JE (c)     Pain Location  shoulder  -JE (r) TD (t) JE (c)     Pain Intervention(s)  Repositioned;Ambulation/increased activity  -JE (r) TD (t) JE (c)     Row Name             Wound 11/24/18 2000 Right lower;lateral arm abrasion    Wound - Properties Group Date first assessed: 11/24/18  -JT Time first assessed: 2000  -JT Present On Admission : yes;picture taken  -JT Side: Right  -JT Orientation: lower;lateral  -JT Location: arm  -JT Type: abrasion  -JT    Row Name             Wound 11/24/18 2000 Right other (see comments) elbow abrasion    Wound - Properties Group Date first assessed: 11/24/18  -JT Time first assessed: 2000  -JT Present On Admission : yes;picture taken  -JT Side: Right  -JT Orientation: other (see comments)  -JT, ELBOW  Location: elbow  -JT Type: abrasion  -JT    Row Name             Wound 11/24/18 2000 Right other (see comments) hip abrasion    Wound - Properties Group Date first assessed: 11/24/18  -JT Time first assessed: 2000  -JT Present On Admission : yes;picture taken  -JT Side: Right  -JT Orientation: other (see comments)  -JT, HIP  Location: hip  -JT Type: abrasion  -JT, BRUISING      Row Name 11/26/18 0849          Plan of Care Review    Plan of Care Reviewed With  patient  -JE (r) TD (t) JE (c)     Row Name 11/26/18 0849          Physical Therapy Clinical Impression    Date of Referral to PT  11/25/18  -JE (r) TD (t) JE (c)     PT Diagnosis (PT Clinical Impression)  impaired endurance, strength, balance, and gait  -JE (r) TD (t) JE (c)     Patient/Family Goals Statement (PT Clinical Impression)  return home  -JE (r) TD (t) JE (c)     Criteria for Skilled Interventions Met (PT Clinical Impression)  yes;treatment indicated to address impairements listed above  -JE (r) TD (t) JE (c)     Pathology/Pathophysiology Noted (Describe Specifically for Each System)  endocrine/metabolic  -JE (r) TD (t) JE (c)     Impairments Found (describe specific impairments)  aerobic capacity/endurance;gait, locomotion, and balance;muscle performance;ROM  -JE (r) TD (t) JE (c)     Rehab Potential (PT Clinical Summary)  good, to achieve stated therapy goals  -JE (r) TD (t) JE (c)     Predicted Duration of Therapy (PT)  until d/c  -JE (r) TD (t) JE (c)     Care Plan Review (PT)  evaluation/treatment results reviewed;care plan/treatment goals reviewed;risks/benefits reviewed;current/potential barriers reviewed;patient/other agree to care plan  -JE (r) TD (t) JE (c)     Row Name 11/26/18 0849          Physical Therapy Goals    Bed Mobility Goal Selection (PT)  bed mobility, PT goal 1  -JE (r) TD (t) JE (c)     Transfer Goal Selection (PT)  transfer, PT goal 1  -JE (r) TD (t) JE (c)     Gait Training Goal Selection (PT)  gait training, PT goal 1  -JE (r) TD (t) JE (c)     Stairs Goal Selection (PT)  stairs, PT goal 1  -JE (r) TD (t) JE (c)     Additional Documentation  Stairs Goal Selection (PT) (Row)  -JE (r) TD (t) JE (c)     Row Name 11/26/18 0849          Bed Mobility Goal 1 (PT)    Activity/Assistive Device (Bed Mobility Goal 1, PT)  bed mobility activities, all  -JE (r) TD (t) JE (c)     [place] Level/Cues  Needed (Bed Mobility Goal 1, PT)  independent  -JE (r) TD (t) JE (c)     Time Frame (Bed Mobility Goal 1, PT)  by discharge  -JE (r) TD (t) JE (c)     Progress/Outcomes (Bed Mobility Goal 1, PT)  goal ongoing  -JE (r) TD (t) JE (c)     Row Name 11/26/18 0849          Transfer Goal 1 (PT)    Activity/Assistive Device (Transfer Goal 1, PT)  transfers, all  -JE (r) TD (t) JE (c)     [place] Level/Cues Needed (Transfer Goal 1, PT)  supervision required  -JE (r) TD (t) JE (c)     Time Frame (Transfer Goal 1, PT)  by discharge  -JE (r) TD (t) JE (c)     Progress/Outcome (Transfer Goal 1, PT)  goal ongoing  -JE (r) TD (t) JE (c)     Row Name 11/26/18 0849          Gait Training Goal 1 (PT)    Activity/Assistive Device (Gait Training Goal 1, PT)  gait (walking locomotion);decrease fall risk;improve balance and speed;increase endurance/gait distance;increase energy conservation;diminish gait deviation  -JE (r) TD (t) JE (c)     [place] Level (Gait Training Goal 1, PT)  standby assist  -JE (r) TD (t) JE (c)     Distance (Gait Goal 1, PT)  200'  -JE (r) TD (t) JE (c)     Time Frame (Gait Training Goal 1, PT)  by discharge  -JE (r) TD (t) JE (c)     Progress/Outcome (Gait Training Goal 1, PT)  goal ongoing  -JE (r) TD (t) JE (c)     Row Name 11/26/18 0849          Stairs Goal 1 (PT)    Activity/Assistive Device (Stairs Goal 1, PT)  stairs, all skills  -JE (r) TD (t) JE (c)     [place] Level/Cues Needed (Stairs Goal 1, PT)  standby assist  -JE (r) TD (t) JE (c)     Number of Stairs (Stairs Goal 1, PT)  3  -JE (r) TD (t) JE (c)     Time Frame (Stairs Goal 1, PT)  by discharge  -JE (r) TD (t) JE (c)     Progress/Outcome (Stairs Goal 1, PT)  goal ongoing  -JE (r) TD (t) JE (c)     Row Name 11/26/18 0849          Positioning and Restraints    Pre-Treatment Position  in bed  -JE (r) TD (t) JE (c)     Post Treatment Position  chair  -JE (r) TD (t) JE (c)     In Chair  sitting;call light within reach;encouraged to call  for assist;with family/caregiver  -JE (r) TD (t) MORGAN (c)     Row Name 11/26/18 0849          Living Environment    Home Accessibility  stairs to enter home;tub/shower is not walk in  -JE (r) TD (t) JE (c)       User Key  (r) = Recorded By, (t) = Taken By, (c) = Cosigned By    Initials Name Provider Type    Iveth Chacko, RN Registered Nurse    Milagros Zepeda, PT Physical Therapist    Theresa Lyons, PT Student PT Student        Physical Therapy Education     Title: PT OT SLP Therapies (Done)     Topic: Physical Therapy (Done)     Point: Mobility training (Done)     Learning Progress Summary           Patient Acceptance, E, VU,DU by TD at 11/26/2018 10:08 AM    Comment:  Pt was educated on benefits of PT and PT POC. Pt was educated on safety precautions and body mechanics with transfers and ambulation. Pt was educated on gait pattern. Pt encouraged to call for assist when needed.                   Point: Body mechanics (Done)     Learning Progress Summary           Patient Acceptance, E, VU,DU by TD at 11/26/2018 10:08 AM    Comment:  Pt was educated on benefits of PT and PT POC. Pt was educated on safety precautions and body mechanics with transfers and ambulation. Pt was educated on gait pattern. Pt encouraged to call for assist when needed.                   Point: Precautions (Done)     Learning Progress Summary           Patient Acceptance, E, VU,DU by TD at 11/26/2018 10:08 AM    Comment:  Pt was educated on benefits of PT and PT POC. Pt was educated on safety precautions and body mechanics with transfers and ambulation. Pt was educated on gait pattern. Pt encouraged to call for assist when needed.                               User Key     Initials Effective Dates Name Provider Type Discipline    TD 09/07/18 -  Theresa Wu, PT Student PT Student PT              PT Recommendation and Plan  Anticipated Discharge Disposition (PT): home with assist, home with home health, skilled nursing  facility  Planned Therapy Interventions (PT Eval): balance training, bed mobility training, gait training, ROM (range of motion), stair training, strengthening, patient/family education, transfer training  Therapy Frequency (PT Clinical Impression): 2 times/day  Outcome Summary/Treatment Plan (PT)  Anticipated Discharge Disposition (PT): home with assist, home with home health, skilled nursing facility  Plan of Care Reviewed With: patient  Progress: no change  Outcome Summary: PT eval completed. Pt is alert and oriented x4. Pt has tremors of head, trunk, and BUEs. Pt performed bed mobility with CGA, verbal cues, and HOB elevated. Pt transferred and ambulated in the burden (~75') with CGA. Pt demo's unsteady narrow KAYLEY, scissoring gait pattern. Pt demo's decreased strength in LLE and decreased ROM in R shoulder. Pt would benefit from skilled PT to address balance, endurance, strength, and fxl mobility in order to improve pt's safety and independence. Pt reports having a couple of falls in the past year and has never recieved therapy for her Parkinson's. PT recommends home with assist and PT HH vs SNF, upon d/c.  Outcome Measures     Row Name 11/26/18 1007 11/26/18 1003          How much help from another person do you currently need...    Turning from your back to your side while in flat bed without using bedrails?  4  -JE (r) TD (t) JE (c)  --     Moving from lying on back to sitting on the side of a flat bed without bedrails?  4  -JE (r) TD (t) JE (c)  --     Moving to and from a bed to a chair (including a wheelchair)?  3  -JE (r) TD (t) JE (c)  --     Standing up from a chair using your arms (e.g., wheelchair, bedside chair)?  4  -JE (r) TD (t) JE (c)  --     Climbing 3-5 steps with a railing?  3  -JE (r) TD (t) JE (c)  --     To walk in hospital room?  3  -JE (r) TD (t) JE (c)  --     AM-PAC 6 Clicks Score  21  -JE (r) TD (t)  --        How much help from another is currently needed...    Putting on and taking off  regular lower body clothing?  --  3  -AC     Bathing (including washing, rinsing, and drying)  --  3  -AC     Toileting (which includes using toilet bed pan or urinal)  --  3  -AC     Putting on and taking off regular upper body clothing  --  4  -AC     Taking care of personal grooming (such as brushing teeth)  --  4  -AC     Eating meals  --  4  -AC     Score  --  21  -AC        Functional Assessment    Outcome Measure Options  AM-PAC 6 Clicks Basic Mobility (PT)  -JE (r) TD (t) JE (c)  AM-PAC 6 Clicks Daily Activity (OT)  -AC       User Key  (r) = Recorded By, (t) = Taken By, (c) = Cosigned By    Initials Name Provider Type    Sam Jules, OTR/L Occupational Therapist    Milagros Zepeda, PT Physical Therapist    Theresa Lyons, PT Student PT Student         Time Calculation:   PT Charges     Row Name 11/26/18 1009             Time Calculation    Start Time  0926 chart review: 0824-0836  -JE (r) TD (t) JE (c)      Stop Time  0952  -JE (r) TD (t) JE (c)      Time Calculation (min)  26 min  -JE (r) TD (t)      PT Received On  11/26/18  -JE (r) TD (t) JE (c)      PT Goal Re-Cert Due Date  12/06/18  -JE (r) TD (t) JE (c)        User Key  (r) = Recorded By, (t) = Taken By, (c) = Cosigned By    Initials Name Provider Type    Milagros Zepeda, PT Physical Therapist    Theresa Lyons, PT Student PT Student        Therapy Suggested Charges     Code   Minutes Charges    None           Therapy Charges for Today     Code Description Service Date Service Provider Modifiers Qty    94024663310 HC PT EVAL LOW COMPLEXITY 3 11/26/2018 Theresa Wu, PT Student GP, KX 1          PT G-Codes  PT Professional Judgement Used?: Yes  Outcome Measure Options: AM-PAC 6 Clicks Basic Mobility (PT)  AM-PAC 6 Clicks Score: 21  Score: 21  Functional Limitation: Mobility: Walking and moving around  Mobility: Walking and Moving Around Current Status (): At least 20 percent but less than 40 percent impaired, limited or  restricted  Mobility: Walking and Moving Around Goal Status (): At least 1 percent but less than 20 percent impaired, limited or restricted      Theresa Wu, PT Student  11/26/2018

## 2018-11-26 NOTE — PLAN OF CARE
Problem: Patient Care Overview  Goal: Plan of Care Review  Outcome: Ongoing (interventions implemented as appropriate)   11/26/18 8155   Plan of Care Review   Progress no change   OTHER   Outcome Summary PO intake is poor at this time, 10% recorded for dinner last PM. No significant wt loss noted per wt hx report. Ordered Boost Plus daily with breakfast and lunch. Will continue to follow.

## 2018-11-27 VITALS
OXYGEN SATURATION: 95 % | TEMPERATURE: 97.8 F | BODY MASS INDEX: 24.65 KG/M2 | DIASTOLIC BLOOD PRESSURE: 70 MMHG | SYSTOLIC BLOOD PRESSURE: 144 MMHG | WEIGHT: 153.4 LBS | HEART RATE: 69 BPM | RESPIRATION RATE: 18 BRPM | HEIGHT: 66 IN

## 2018-11-27 LAB
ALBUMIN SERPL-MCNC: 2.8 G/DL (ref 3.5–5)
ALBUMIN/GLOB SERPL: 1.1 G/DL (ref 1.1–2.5)
ALP SERPL-CCNC: 64 U/L (ref 24–120)
ALT SERPL W P-5'-P-CCNC: 21 U/L (ref 0–54)
ANION GAP SERPL CALCULATED.3IONS-SCNC: 3 MMOL/L (ref 4–13)
AST SERPL-CCNC: 135 U/L (ref 7–45)
BACTERIA SPEC AEROBE CULT: NORMAL
BASOPHILS # BLD AUTO: 0.01 10*3/MM3 (ref 0–0.2)
BASOPHILS NFR BLD AUTO: 0.1 % (ref 0–2)
BILIRUB SERPL-MCNC: 0.6 MG/DL (ref 0.1–1)
BUN BLD-MCNC: 12 MG/DL (ref 5–21)
BUN/CREAT SERPL: 17.1 (ref 7–25)
CALCIUM SPEC-SCNC: 8.1 MG/DL (ref 8.4–10.4)
CHLORIDE SERPL-SCNC: 103 MMOL/L (ref 98–110)
CK SERPL-CCNC: 2841 U/L (ref 0–203)
CO2 SERPL-SCNC: 33 MMOL/L (ref 24–31)
CREAT BLD-MCNC: 0.7 MG/DL (ref 0.5–1.4)
DEPRECATED RDW RBC AUTO: 44.6 FL (ref 40–54)
EOSINOPHIL # BLD AUTO: 0.19 10*3/MM3 (ref 0–0.7)
EOSINOPHIL NFR BLD AUTO: 2.4 % (ref 0–4)
ERYTHROCYTE [DISTWIDTH] IN BLOOD BY AUTOMATED COUNT: 12.9 % (ref 12–15)
GFR SERPL CREATININE-BSD FRML MDRD: 82 ML/MIN/1.73
GLOBULIN UR ELPH-MCNC: 2.6 GM/DL
GLUCOSE BLD-MCNC: 92 MG/DL (ref 70–100)
HCT VFR BLD AUTO: 33.4 % (ref 37–47)
HGB BLD-MCNC: 11.1 G/DL (ref 12–16)
LYMPHOCYTES # BLD AUTO: 1.37 10*3/MM3 (ref 0.72–4.86)
LYMPHOCYTES NFR BLD AUTO: 17.6 % (ref 15–45)
MCH RBC QN AUTO: 31.4 PG (ref 28–32)
MCHC RBC AUTO-ENTMCNC: 33.2 G/DL (ref 33–36)
MCV RBC AUTO: 94.6 FL (ref 82–98)
MONOCYTES # BLD AUTO: 0.75 10*3/MM3 (ref 0.19–1.3)
MONOCYTES NFR BLD AUTO: 9.6 % (ref 4–12)
NEUTROPHILS # BLD AUTO: 5.45 10*3/MM3 (ref 1.87–8.4)
NEUTROPHILS NFR BLD AUTO: 69.9 % (ref 39–78)
PLATELET # BLD AUTO: 116 10*3/MM3 (ref 130–400)
PMV BLD AUTO: 10.2 FL (ref 6–12)
POTASSIUM BLD-SCNC: 3.6 MMOL/L (ref 3.5–5.3)
PROT SERPL-MCNC: 5.4 G/DL (ref 6.3–8.7)
RBC # BLD AUTO: 3.53 10*6/MM3 (ref 4.2–5.4)
SODIUM BLD-SCNC: 139 MMOL/L (ref 135–145)
WBC NRBC COR # BLD: 7.8 10*3/MM3 (ref 4.8–10.8)

## 2018-11-27 PROCEDURE — 25010000002 HEPARIN (PORCINE) PER 1000 UNITS: Performed by: FAMILY MEDICINE

## 2018-11-27 PROCEDURE — 97535 SELF CARE MNGMENT TRAINING: CPT

## 2018-11-27 PROCEDURE — 85025 COMPLETE CBC W/AUTO DIFF WBC: CPT | Performed by: NURSE PRACTITIONER

## 2018-11-27 PROCEDURE — 80053 COMPREHEN METABOLIC PANEL: CPT | Performed by: NURSE PRACTITIONER

## 2018-11-27 PROCEDURE — 82550 ASSAY OF CK (CPK): CPT | Performed by: NURSE PRACTITIONER

## 2018-11-27 RX ADMIN — SODIUM CHLORIDE, POTASSIUM CHLORIDE, SODIUM LACTATE AND CALCIUM CHLORIDE 125 ML/HR: 600; 310; 30; 20 INJECTION, SOLUTION INTRAVENOUS at 05:33

## 2018-11-27 RX ADMIN — SODIUM CHLORIDE, PRESERVATIVE FREE 3 ML: 5 INJECTION INTRAVENOUS at 09:34

## 2018-11-27 RX ADMIN — ROPINIROLE HYDROCHLORIDE 0.25 MG: 0.25 TABLET, FILM COATED ORAL at 05:32

## 2018-11-27 RX ADMIN — PAROXETINE HYDROCHLORIDE 20 MG: 20 TABLET, FILM COATED ORAL at 09:33

## 2018-11-27 RX ADMIN — HEPARIN SODIUM 5000 UNITS: 5000 INJECTION, SOLUTION INTRAVENOUS; SUBCUTANEOUS at 09:33

## 2018-11-27 RX ADMIN — FAMOTIDINE 40 MG: 20 TABLET, FILM COATED ORAL at 09:33

## 2018-11-27 RX ADMIN — CARBIDOPA AND LEVODOPA 2 TABLET: 25; 100 TABLET ORAL at 05:32

## 2018-11-27 RX ADMIN — LEVOTHYROXINE SODIUM 75 MCG: 0.07 TABLET ORAL at 05:32

## 2018-11-27 RX ADMIN — ROPINIROLE HYDROCHLORIDE 0.25 MG: 0.25 TABLET, FILM COATED ORAL at 13:14

## 2018-11-27 RX ADMIN — CARBIDOPA AND LEVODOPA 2 TABLET: 25; 100 TABLET ORAL at 13:14

## 2018-11-27 RX ADMIN — AMANTADINE HYDROCHLORIDE 100 MG: 100 TABLET ORAL at 09:33

## 2018-11-27 NOTE — PLAN OF CARE
Problem: Patient Care Overview  Goal: Plan of Care Review  Outcome: Ongoing (interventions implemented as appropriate)   11/26/18 7097   Coping/Psychosocial   Plan of Care Reviewed With patient   Plan of Care Review   Progress improving   OTHER   Outcome Summary Patient A&O X3. Safety maintained. FC dc'd. IV abx given as ordered.     Goal: Individualization and Mutuality  Outcome: Ongoing (interventions implemented as appropriate)    Goal: Discharge Needs Assessment  Outcome: Ongoing (interventions implemented as appropriate)    Goal: Interprofessional Rounds/Family Conf  Outcome: Ongoing (interventions implemented as appropriate)      Problem: Fall Risk (Adult)  Goal: Absence of Fall  Outcome: Ongoing (interventions implemented as appropriate)      Problem: Confusion, Acute (Adult)  Goal: Cognitive/Functional Impairments Minimized  Outcome: Ongoing (interventions implemented as appropriate)    Goal: Safety  Outcome: Ongoing (interventions implemented as appropriate)      Problem: Urinary Tract Infection (Adult)  Goal: Signs and Symptoms of Listed Potential Problems Will be Absent, Minimized or Managed (Urinary Tract Infection)  Outcome: Ongoing (interventions implemented as appropriate)

## 2018-11-27 NOTE — PLAN OF CARE
Problem: Patient Care Overview  Goal: Plan of Care Review  Outcome: Ongoing (interventions implemented as appropriate)   11/27/18 0326   Coping/Psychosocial   Plan of Care Reviewed With patient   Plan of Care Review   Progress improving   OTHER   Outcome Summary Patient rested well through the night. no c/o pain, no neuro changes, remains disoriented to situation, VSS, Safety maintained.       Problem: Fall Risk (Adult)  Goal: Absence of Fall  Outcome: Ongoing (interventions implemented as appropriate)      Problem: Confusion, Acute (Adult)  Goal: Cognitive/Functional Impairments Minimized  Outcome: Ongoing (interventions implemented as appropriate)    Goal: Safety  Outcome: Ongoing (interventions implemented as appropriate)      Problem: Urinary Tract Infection (Adult)  Goal: Signs and Symptoms of Listed Potential Problems Will be Absent, Minimized or Managed (Urinary Tract Infection)  Outcome: Ongoing (interventions implemented as appropriate)

## 2018-11-27 NOTE — PROGRESS NOTES
Continued Stay Note  [facility]     Patient Name: Purnima Sutherland  MRN: [id number]  Today's Date: 11/27/2018    Admit Date: 11/24/2018    Discharge Plan     Row Name 11/27/18 1002       Plan    Plan  [facility]    Final Discharge Disposition Code  06 - home with home health care    Final Note  Plan for discharge home today. ROB spoke to patient's son, Ender [phone], to notify of this. Ender has requested [facility] and SIRENA Conteh does plan to order home health. ROB called DIMITRI Ashford with [facility] to provide referral information. Patient will be set up with a schedule to be seen by [facility] at discharge. No other discharge planning needs.         Discharge Codes    No documentation.             DIEGO Bernard

## 2018-11-27 NOTE — DISCHARGE SUMMARY
[facility] Medicine Services  DISCHARGE SUMMARY       Date of Admission: 11/24/2018  Date of Discharge:  11/27/2018  Primary Care Physician: Beny Garner MD    Presenting Problem/History of Present Illness:  Fall, initial encounter [W19.XXXA]     Discharge Diagnoses:  1.  Acute nontraumatic rhabdomyolysis secondary to suspected fall, CPK 9522, myoglobin 2116  2.  Suspected fall  3.  Acute cystitis, pyuria, present on admission. 1+ bacteria, negative nitrates, WBC 6-12, culture no growth  4.  Mental status changes with hallucinations and metabolic encephalopathy suspect secondary to urinary tract infection, recent addition of amantadine may also be  contributing  factor, resolved and at baseline at discharge  5.  Leukocytosis suspect secondary to fall and rhabdomyolysis  6.  Mild hypernatremia secondary to volume depletion and #1, resolved  7.  Elevated LFTs, resolved  8.  Parkinson's disease  9.  Hypothyroidism    Chief Complaint on Day of Discharge: No complaints.  History of Present Illness on Day of Discharge:   Sitting up in bed eating.  No family in room.  She answers questions appropriately.  She denies nausea, vomiting or abdominal pain.  She denies chest pain, palpitations or shortness of breath.  Feels as though she is at baseline.  Denies dysuria.  Ambulated with physical therapy.  If her home with home health.    Consults: Dr. Howard, neurology    Procedures Performed: None    Pertinent Test Results:    Results for orders placed during the hospital encounter of 11/24/18   Adult Transthoracic Echo Complete W/ Cont if Necessary Per Protocol    Narrative · Left ventricular systolic function is normal. Estimated EF = 60%.  · Left ventricular diastolic dysfunction.  · No evidence of pulmonary hypertension is present.        Noninvasive carotids less than 50% stenosis bilaterally    Laboratory Data:    Results from last 7 days   Lab Units  11/27/18   0647  11/26/18    0435  11/25/18   0609   WBC 10*3/mm3  7.80  11.07*  14.63*   HEMOGLOBIN g/dL  11.1*  11.6*  13.1   HEMATOCRIT %  33.4*  36.8*  39.3   PLATELETS 10*3/mm3  116*  156  113*        Results from last 7 days   Lab Units  11/27/18   0647  11/26/18   0435  11/25/18   0609   SODIUM mmol/L  139  142  146*   POTASSIUM mmol/L  3.6  3.6  3.6   CHLORIDE mmol/L  103  107  111*   CO2 mmol/L  33.0*  30.0  26.0   BUN mg/dL  12  25*  33*   CREATININE mg/dL  0.70  0.77  0.85   CALCIUM mg/dL  8.1*  8.2*  8.5   BILIRUBIN mg/dL  0.6  0.7  1.3*   ALK PHOS U/L  64  65  76   ALT (SGPT) U/L  21  25  65*   AST (SGOT) U/L  135*  167*  208*   GLUCOSE mg/dL  92  77  111*     Imaging Results (all)     Procedure Component Value Units Date/Time    US Carotid Bilateral [[id number]] Updated:  11/26/18 0830    US Abdomen Limited [[id number]] Updated:  11/26/18 0806    XR Hip With or Without Pelvis 2 - 3 View Right [[id number]] Collected:  11/24/18 2116     Updated:  11/24/18 2119    Narrative:       EXAMINATION: XR HIP W OR WO PELVIS 2-3 VIEW RIGHT-     11/24/2018 8:44 PM CST     HISTORY: Fall injury. Pelvis and right hip, 3 views.     The pelvic ring is intact.   No pubic symphysis or sacroiliac joint diastasis.     No discrete soft tissue abnormality is seen.  No hip fracture is seen.  Mild right femoral head and acetabular spurring     Summary :  1. No acute bony abnormality is seen.  This report was finalized on 11/24/2018 21:16 by Dr. Shivam Melgar MD.    XR Elbow 2 View Right [[id number]] Collected:  11/24/18 2115     Updated:  11/24/18 2119    Narrative:       EXAMINATION: XR ELBOW 2 VW RIGHT-     11/24/2018 8:44 PM CST     HISTORY: RIGHT ELBOW ABRASION, SWELLING AND PAIN.  Fall injury.     Right elbow, 2 views.     The distal humerus and proximal radius and ulna have a normal  appearance.   The soft tissues are appropriate.  There is a normal appearance of the elbow joint.  No significant joint effusion is seen.       Impression:       1. No acute  bony abnormality is seen.  This report was finalized on 11/24/2018 21:15 by Dr. Shivam Melgar MD.    XR Humerus Right [[id number]] Collected:  11/24/18 2115     Updated:  11/24/18 2118    Narrative:       EXAMINATION: XR HUMERUS RIGHT-     11/24/2018 8:44 PM CST     HISTORY: RIGHT ARM PAIN AND SWELLING.  Fall injury.     Right humerus, 2 views.     No humerus fracture is seen.     The shoulder and elbow are normal, to the extent visualized.   No discrete soft tissue abnormality is seen.     Summary:  1. No acute bony abnormality.   This report was finalized on 11/24/2018 21:15 by Dr. Shivam Melgar MD.    XR Chest 1 View [[id number]] Collected:  11/24/18 2114     Updated:  11/24/18 2118    Narrative:       EXAMINATION: XR CHEST 1 VW-     11/24/2018 8:43 PM CST     HISTORY: Fall injury. Blunt trauma.     One view chest x-ray compared with 5/26/2017.     Heart size is normal.  The mediastinum is within normal limits.      The lungs are normally expanded with no pneumonia or pneumothorax.       No congestive failure changes.                                                                       Impression:       1. No acute disease.        This report was finalized on 11/24/2018 21:14 by Dr. Shivam Melgar MD.    CT Cervical Spine Without Contrast [[id number]] Collected:  11/24/18 2027     Updated:  11/24/18 2031    Narrative:       EXAMINATION: CT CERVICAL SPINE WO CONTRAST-      11/24/2018 8:03 PM CST     HISTORY: C-spine trauma, NEXUS/CCR negative, low risk.  Fall injury. Head and neck trauma.     In order to have a CT radiation dose as low as reasonably achievable  Automated Exposure Control was utilized for adjustment of the mA and/or  KV according to patient size.     DLP in mGycm= 309.     Axial, sagittal, and coronal noncontrast CT imaging.     Vertebral bodies and posterior elements are intact.     Reconstructed sagittal images show normal curvature.   There is no malalignment. Prevertebral soft tissues are normal.    Facet joints align normally.     Moderate degenerative disc, endplate, and facet joint degenerative  change.     Reconstructed coronal images show normal lateral mass alignment.       Impression:       1. No acute fracture.    This report was finalized on 11/24/2018 20:28 by Dr. Shivma Melgar MD.    CT Head Without Contrast [[id number]] Collected:  11/24/18 2026     Updated:  11/24/18 2030    Narrative:       EXAMINATION: CT HEAD WO CONTRAST-      11/24/2018 8:03 PM CST     HISTORY: Head trauma, ataxia     In order to have a CT radiation dose as low as reasonably achievable  Automated Exposure Control was utilized for adjustment of the mA and/or  KV according to patient size.     DLP in mGycm= noncontrast head CT compared with 9/6/2018.     Axial, sagittal, and coronal noncontrast CT imaging of the head.     The visualized paranasal sinuses are clear.     The brain and ventricles have an age appropriate appearance.   There is no hemorrhage or mass-effect.   No acute infarction is seen.     No calvarial abnormality.       Impression:       1. No acute intracranial abnormality is seen.   This report was finalized on 11/24/2018 20:27 by Dr. Shivam Melgar MD.        Culture Data:   Blood Culture   Date Value Ref Range Status   11/24/2018 No growth at 2 days  Preliminary   11/24/2018 No growth at 2 days  Preliminary     Urine Culture   Date Value Ref Range Status   11/25/2018 No growth at 2 days  Final       Hospital Course  Patient is a 73 y.o. female presented to [facility] emergency room 11/24/18 after family members found her in the floor in the closet home at 8 PM.  Son reported the patient was in her usual state on 11/23 at 8 PM when he last saw her.  The following day when family checked on her she was found in the closet and was unsure how she had gotten there.  Son reported that she was in a twisted position.  Patient reported that she thought she had fallen and late in the floor all night.  She has  a history of Parkinson's and son reported that she was recently started on amantane by .  Son reports patient has been having intermittent hallucinations, seeing bugs and thought she was in a recent car accident.  She reported right hip pain and right elbow pain.  All x-rays negative in the emergency room.  CK 7203 on admission, myoglobin 2116, urinalysis 1+ bacteria.  She received lactated Ringer's fluid bolus and Rocephin IV in the emergency room.    She was admitted to the neurology floor with mental status changes, acute nontraumatic rhabdomyolysis secondary to suspected fall, suspected urinary tract infection.  She was hydrated with IV fluids.  CT scan of the cervical spine negative, CT scan of the head negative, x-ray right elbow no abnormality noted.  Right hip x-ray no abnormality, right humerus x-ray no abnormality.  Ongoing workup included echocardiogram reported ejection fraction 60%.  No pulmonary hypertension.  Left ventricular diastolic dysfunction.  Noninvasive carotids less than 50% stenosis bilaterally.  Speech therapy evaluated patient with no abnormality noted.  She was seen in consultation by Dr. Howard with neurology.  He reported patient has long-standing history of Parkinson's disease diagnosed over 5 years ago followed by Dr. Ritchie.  She is on high-dose Sinemet and Requip with addition of amantadine likely the end of September.  Patient had contacted Dr. Ritchie office 11/21 with suggestions of hallucinations.  Family was concerned regarding amantadine.  Recommendations at that time were that the symptoms were temporary and would pass.  Patient takes regular Sinemet 25/102 pills 3 times daily, Sinemet CR 50/203 times a day with staggering doses.  She also takes Xadago.  The following morning after admission symptoms were improving.  Dr. Howard felt so hallucinations or and metabolic encephalopathy possibly secondary to urinary tract infection although he could not completely rule  out  Amantadine.  However, he desired to if symptoms improve with treatment of urinary tract infection.  Plans were to discontinue amantadine if confusion worsens or continues despite treatment.  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  Defer this to primary neurologist.    Over the course of the next 24 hours hallucinations resolved, patient seemed to be at baseline per son.  Physical therapy and occupational therapy consulted.  Patient was able to ambulate.  Patient has tremors of head and trunk.  Recommend home with home health with physical therapy and home health.  She was seen by occupational therapy and speech therapy with recommendations for home health.  Patient and son declines SNF and were agreeable to home health with physical therapy and occupational therapy at discharge.    She presented with acute nontraumatic rhabdomyolysis secondary to suspected fall.  CPK 9522 with myoglobin 2016.  CK trended down to 2841 by discharge.  Myoglobin 529.  She was hydrated with IV fluids.  She was able to tolerate regular diet without nausea, vomiting or abdominal pain.  She was noted to have abrasion right hip, right elbow.    Urinalysis 6-12 WBC, 1+ bacteria.  She was placed on Rocephin and completed treatment for acute cystitis present on admission during hospitalization.  Urine culture no growth and blood cultures remain no growth at 2 days.    On 11/27/18 she is medically stable for discharge. Mental status at baseline per patient and per family members.  She has completed treatment for acute cystitis.  No adjustment in home medications for parkinsonism.  Physical therapy and occupational therapy recommended home health at discharge and family agreeable.  Follow-up with her primary care provider Beny Garner 11/30/18 and keep scheduled appointment with Dr. Ritchie in December.    "Anita suggested that patient may be a candidate for deep brain stimulation but will defer to primary neurologist with follow-up appointment.  Discussed with Dr. Howard prior to discharge.    Physical Exam on Discharge:  /67 (BP Location: Right arm, Patient Position: Lying)   Pulse 72   Temp 97.5 °F (36.4 °C) (Oral)   Resp 18   Ht 167.6 cm (66\")   Wt 69.6 kg (153 lb 6.4 oz)   SpO2 91%   BMI 24.76 kg/m²   Physical Exam  Constitutional: She is oriented to person, place, and time. She appears well-developed.   Ill appearing   HENT:   Head: Normocephalic and atraumatic.   Eyes: EOM are normal. Pupils are equal, round, and reactive to light.   Neck: Normal range of motion. Neck supple.   Cardiovascular: Normal rate, regular rhythm, normal heart sounds and intact distal pulses. Exam reveals no gallop and no friction rub.   No murmur heard.  Normal sinus rhythm 69-80 on telemetry   Pulmonary/Chest: Effort normal and breath sounds normal. No respiratory distress. She has no wheezes. She has no rales.   No oxygen in place.   Abdominal: Soft. Bowel sounds are normal. She exhibits no distension. There is no tenderness.   Musculoskeletal: She exhibits tenderness (Right hip, right forearm). She exhibits no edema.   Neurological: She is oriented to person, place, and time.   Answers most questions appropriately.  At baseline.  Skin:   Bruising left lower extremity.  Abrasion right hip, abrasion right elbow     Condition on Discharge: Stable    Discharge Disposition:  Home with home health    Discharge Diet:   Diet Instructions     Advance Diet As Tolerated            Activity at Discharge:   Activity Instructions     Activity as Tolerated            Discharge Care Plan / Instructions:   1.  For worsening returning symptoms of confusion seek medical attention.  2.  Home medications unchanged.  3.  Keep scheduled appointment with Dr. Ritchie. May be candidate for deep brain stimulation.    Discharge Medications:   "   Discharge Medications      Continue These Medications      Instructions Start Date   carbidopa-levodopa  MG per tablet  Commonly known as:  SINEMET   2 tablets, Oral, 3 Times Daily      carbidopa-levodopa CR  MG per CR tablet  Commonly known as:  SINEMET CR   1 tablet, Oral, 3 Times Daily      GOCOVRI 137 MG capsule sustained-release 24 hr  Generic drug:  Amantadine HCl ER   2 capsules, Oral, Nightly, Started 2-3 weeks ago family thinks causing all the problems      levothyroxine 75 MCG tablet  Commonly known as:  SYNTHROID, LEVOTHROID   75 mcg, Oral, Daily      PARoxetine 20 MG tablet  Commonly known as:  PAXIL   20 mg, Oral, Daily      rOPINIRole 0.25 MG tablet  Commonly known as:  REQUIP   0.25 mg, Oral, 3 Times Daily, Take 1 hour before bedtime.      Safinamide Mesylate 50 MG tablet   50 mg, Oral, 2 Times Daily           Follow-up Appointments:   Follow-up Information     Beny Garner MD Follow up on 11/30/2018.    Specialty:  Internal Medicine  Why:  FRIDAY NOVEMBER 30 @ 12:45  Contact information:  [address]  [place] [address]  [phone]             Beny Ritchie MD Follow up.    Specialty:  Neurology  Why:  Keep scheduled appointment in Dec 2018  Contact information:  [address]  [place] [address]  [phone]                   Future Appointments:  No future appointments.    Test Results Pending at Discharge: none    The above documentation resulted from a face-to-face encounter by latrell PACK, [facility].    SIRENA Klein  11/27/18  11:43 AM    Time:  This discharge process required 40 minutes for completion.     Plan discussed with Dr. Osorio, Dr. Howard, patient, and son Ender.    Time spent in face-to-face evaluation, chart review, planning and education 40 minutes.  Please note that portions of this note may have been completed with a voice recognition program. Efforts were made to edit the dictations, but occasionally words are  terrence.    I personally evaluated and examined the patient in conjunction with SIRENA Hernandez and agree with the assessment, treatment plan, and disposition of the patient as recorded by her. My history, exam, and further recommendations are: I have reviewed and agree with the plans. Brett Osorio MD  11/27/18  7:14 PM

## 2018-11-27 NOTE — THERAPY DISCHARGE NOTE
Acute Care - Occupational Therapy Discharge Summary  [facility]     Patient Name: Purnima Sutherland  : 1945  MRN: [id number]    Today's Date: 2018  Onset of Illness/Injury or Date of Surgery: 18    Date of Referral to OT: 18  Referring Physician: Yady PACK      Admit Date: 2018        OT Recommendation and Plan    Visit Dx:    ICD-10-CM ICD-9-CM   1. Fall, initial encounter W19.XXXA E888.9   2. Cystitis N30.90 595.9   3. Altered mental status, unspecified altered mental status type R41.82 780.97   4. Dysphagia, unspecified type R13.10 787.20   5. Decreased activities of daily living (ADL) R68.89 780.99   6. Impaired functional mobility, balance, gait, and endurance Z74.09 V49.89         Time Calculation- OT     Row Name 18 1127             Time Calculation- OT    OT Start Time  0951  -TS      OT Stop Time  1030  -TS      OT Time Calculation (min)  39 min  -TS      Total Timed Code Minutes- OT  39 minute(s)  -TS      OT Received On  18  -TS         Timed Charges    03422 - OT Self Care/Mgmt Minutes  39  -TS        User Key  (r) = Recorded By, (t) = Taken By, (c) = Cosigned By    Initials Name Provider Type    TS Yesenia Rios VILLA/L Occupational Therapy Assistant            OT Rehab Goals     Row Name 18 1500             Patient Education Goal (OT)    Activity (Patient Education Goal, OT)  home safety  -TS      [place]/Cues/Accuracy (Memory Goal 2, OT)  demonstrates adequately;independent;verbalizes understanding  -TS      Time Frame (Patient Education Goal, OT)  long term goal (LTG);10 days  -TS      Progress/Outcome (Patient Education Goal, OT)  goal not met  -TS        User Key  (r) = Recorded By, (t) = Taken By, (c) = Cosigned By    Initials Name Provider Type Discipline    TS Yesenia Rios VILLA/L Occupational Therapy Assistant OT          Outcome Measures     Row Name 18 1100 18 1007 18 1003       How much help from  another person do you currently need...    Turning from your back to your side while in flat bed without using bedrails?  --  4  -JE (r) TD (t) JE (c)  --    Moving from lying on back to sitting on the side of a flat bed without bedrails?  --  4  -JE (r) TD (t) JE (c)  --    Moving to and from a bed to a chair (including a wheelchair)?  --  3  -JE (r) TD (t) JE (c)  --    Standing up from a chair using your arms (e.g., wheelchair, bedside chair)?  --  4  -JE (r) TD (t) JE (c)  --    Climbing 3-5 steps with a railing?  --  3  -JE (r) TD (t) JE (c)  --    To walk in hospital room?  --  3  -JE (r) TD (t) JE (c)  --    AM-PAC 6 Clicks Score  --  21  -JE (r) TD (t)  --       How much help from another is currently needed...    Putting on and taking off regular lower body clothing?  3  -TS  --  3  -AC    Bathing (including washing, rinsing, and drying)  3  -TS  --  3  -AC    Toileting (which includes using toilet bed pan or urinal)  3  -TS  --  3  -AC    Putting on and taking off regular upper body clothing  4  -TS  --  4  -AC    Taking care of personal grooming (such as brushing teeth)  4  -TS  --  4  -AC    Eating meals  4  -TS  --  4  -AC    Score  21  -TS  --  21  -AC       Functional Assessment    Outcome Measure Options  AM-PAC 6 Clicks Daily Activity (OT)  -TS  AM-PAC 6 Clicks Basic Mobility (PT)  -JE (r) TD (t) JE (c)  AM-PAC 6 Clicks Daily Activity (OT)  -AC      User Key  (r) = Recorded By, (t) = Taken By, (c) = Cosigned By    Initials Name Provider Type    Sam Jules, OTR/L Occupational Therapist    Yesenia Crawford, VILLA/L Occupational Therapy Assistant    Milagros Zepeda, PT Physical Therapist    TD Theresa Wu, PT Student PT Student          Therapy Suggested Charges     Code   Minutes Charges    59646 (CPT®) Hc Ot Neuromusc Re Education Ea 15 Min      32891 (CPT®) Hc Ot Ther Proc Ea 15 Min      43937 (CPT®) Hc Ot Therapeutic Act Ea 15 Min      00295 (CPT®) Hc Ot Manual Therapy Ea 15  Min      49592 (CPT®) Hc Ot Iontophoresis Ea 15 Min      66852 (CPT®) Hc Ot Elec Stim Ea-Per 15 Min      57923 (CPT®) Hc Ot Ultrasound Ea 15 Min      05978 (CPT®) Hc Ot Self Care/Mgmt/Train Ea 15 Min 39 3    Total  39 3          Therapy Charges for Today     Code Description Service Date Service Provider Modifiers Qty    [id number] HC OT SELF CARE/MGMT/TRAIN EA 15 MIN 11/27/2018 Yesenia Rios COTA/L GO, KX 3          OT Discharge Summary  Reason for Discharge: Discharge from facility  Outcomes Achieved: Refer to plan of care for updates on goals achieved  Discharge Destination: Home      DAISY Williamson/TAYLOR  11/27/2018

## 2018-11-27 NOTE — THERAPY TREATMENT NOTE
Acute Care - Occupational Therapy Treatment Note  [facility]     Patient Name: Purnima Sutherland  : 1945  MRN: [id number]  Today's Date: 2018  Onset of Illness/Injury or Date of Surgery: 18  Date of Referral to OT: 18  Referring Physician: Yady PACK    Admit Date: 2018       ICD-10-CM ICD-9-CM   1. Fall, initial encounter W19.XXXA E888.9   2. Cystitis N30.90 595.9   3. Altered mental status, unspecified altered mental status type R41.82 780.97   4. Dysphagia, unspecified type R13.10 787.20   5. Decreased activities of daily living (ADL) R68.89 780.99   6. Impaired functional mobility, balance, gait, and endurance Z74.09 V49.89     Patient Active Problem List   Diagnosis   • Osteoporosis, post-menopausal   • Fall   • Non-traumatic rhabdomyolysis     Past Medical History:   Diagnosis Date   • Depression    • Disease of thyroid gland    • Parkinson disease (CMS/HCC)      Past Surgical History:   Procedure Laterality Date   • CHOLECYSTECTOMY     • CYST REMOVAL         Therapy Treatment    Rehabilitation Treatment Summary     Row Name 18 0951             Treatment Time/Intention    Discipline  occupational therapy assistant  -TS      Document Type  therapy note (daily note)  -TS      Subjective Information  no complaints  -TS2      Patient Effort  good  -TS2      Existing Precautions/Restrictions  fall  -TS2      Recorded by [TS] Yesenia Rios COTA/L 18 1012  [TS2] Yesenia Rios VILLA/L 18 1102      Row Name 18 0951             Cognitive Assessment/Intervention- PT/OT    Personal Safety Interventions  fall prevention program maintained;nonskid shoes/slippers when out of bed;gait belt  -TS      Recorded by [TS] Yesenia Rios VILLA/L 18 1125      Row Name 18 0951             Bed Mobility Assessment/Treatment    Bed Mobility Assessment/Treatment  supine-sit  -TS      Supine-Sit [place] (Bed Mobility)  conditional independence   -TS      Assistive Device (Bed Mobility)  head of bed elevated  -TS      Recorded by [TS] Yesenia Rios VILLA/L 11/27/18 1125      Row Name 11/27/18 0951             Functional Mobility    Functional Mobility- Ind. Level  supervision required  -TS      Functional Mobility- Comment  in room  -TS      Recorded by [TS] Yesenia Rios, VILLA/L 11/27/18 1125      Row Name 11/27/18 0951             Transfer Assessment/Treatment    Transfer Assessment/Treatment  sit-stand transfer;stand-sit transfer  -TS      Comment (Transfers)  discussed shower transfer along with BSC frame over toilet for increased stability with toilet transfer  -TS      Recorded by [TS] Yesenia Rios VILLA/L 11/27/18 1125      Row Name 11/27/18 0951             Sit-Stand Transfer    Sit-Stand [place] (Transfers)  stand by assist  -TS      Recorded by [TS] Yesenia Rios VILLA/L 11/27/18 1125      Row Name 11/27/18 0951             Stand-Sit Transfer    Stand-Sit [place] (Transfers)  stand by assist  -TS      Recorded by [TS] Yesenia Rios VILLA/L 11/27/18 1125      Row Name 11/27/18 0951             ADL Assessment/Intervention    BADL Assessment/Intervention  upper body dressing;lower body dressing  -TS      Recorded by [TS] Yesenia Rios, VILLA/L 11/27/18 1125      Row Name 11/27/18 0951             Upper Body Dressing Assessment/Training    Upper Body Dressing [place] Level  don;pajama/robe;set up  -TS      Upper Body Dressing Position  unsupported standing  -TS      Recorded by [TS] Yesenia Rios, VILLA/L 11/27/18 1125      Row Name 11/27/18 0951             Lower Body Dressing Assessment/Training    Lower Body Dressing [place] Level  don;undergarment;supervision  -TS      Lower Body Dressing Position  edge of bed sitting;unsupported standing  -TS      Recorded by [TS] Yesenia Rios, VILLA/L 11/27/18 1125      Row Name 11/27/18 0951             Positioning and Restraints     Pre-Treatment Position  in bed  -TS      Post Treatment Position  chair  -TS      In Chair  sitting;call light within reach;encouraged to call for assist  -TS      Recorded by [TS] Yesenia Rios COTA/L 11/27/18 1125      Row Name 11/27/18 0951             Pain Scale: Numbers Pre/Post-Treatment    Pain Scale: Numbers, Pretreatment  0/10 - no pain  -TS      Recorded by [TS] Yesenia Rios COTA/L 11/27/18 1125      Row Name                Wound 11/24/18 2000 Right lower;lateral arm abrasion    Wound - Properties Group Date first assessed: 11/24/18 [JT] Time first assessed: 2000 [JT] Present On Admission : yes;picture taken [JT] Side: Right [JT] Orientation: lower;lateral [JT] Location: arm [JT] Type: abrasion [JT] Recorded by:  [JT] Iveth Clarke RN 11/24/18 2032    Row Name                Wound 11/24/18 2000 Right other (see comments) elbow abrasion    Wound - Properties Group Date first assessed: 11/24/18 [JT] Time first assessed: 2000 [JT] Present On Admission : yes;picture taken [JT] Side: Right [JT] Orientation: other (see comments) [JT], ELBOW  Location: elbow [JT] Type: abrasion [JT] Recorded by:  [JT] Iveth Clarke RN 11/24/18 2034    Row Name                Wound 11/24/18 2000 Right other (see comments) hip abrasion    Wound - Properties Group Date first assessed: 11/24/18 [JT] Time first assessed: 2000 [JT] Present On Admission : yes;picture taken [JT] Side: Right [JT] Orientation: other (see comments) [JT], HIP  Location: hip [JT] Type: abrasion [JT], BRUISING  Recorded by:  [JT] Iveth Clarke RN 11/24/18 2036    Row Name 11/27/18 0951             Outcome Summary/Treatment Plan (OT)    Daily Summary of Progress (OT)  progress toward functional goals is good  -TS      Recorded by [TS] Yesenia iRos COTA/L 11/27/18 1125        User Key  (r) = Recorded By, (t) = Taken By, (c) = Cosigned By    Initials Name Effective Dates Discipline    Yesenia Crawford, DAISY/TAYLOR  08/02/16 -  OT    Iveth Chacko RN 06/13/18 -  Nurse        Wound 11/24/18 2000 Right lower;lateral arm abrasion (Active)   Dressing Appearance dry;intact;no drainage 11/26/2018  8:00 PM   Drainage Amount none 11/26/2018  8:00 PM   Dressing Care, Wound gauze 11/26/2018  8:00 PM       Wound 11/24/18 2000 Right other (see comments) elbow abrasion (Active)   Dressing Appearance dry;intact;no drainage 11/26/2018  8:00 PM   Drainage Amount none 11/26/2018  8:00 PM   Dressing Care, Wound gauze 11/26/2018  8:00 PM       Wound 11/24/18 2000 Right other (see comments) hip abrasion (Active)   Dressing Appearance dry;intact;no drainage 11/26/2018  8:00 PM   Drainage Amount none 11/26/2018  8:00 PM   Dressing Care, Wound gauze 11/26/2018  8:00 PM       Occupational Therapy Education     Title: PT OT SLP Therapies (Done)     Topic: Occupational Therapy (Done)     Point: ADL training (Done)     Description: Instruct learner(s) on proper safety adaptation and remediation techniques during self care or transfers.   Instruct in proper use of assistive devices.    Learning Progress Summary           Patient Acceptance, E, VU by  at 11/27/2018 11:25 AM    Comment:  home safety, DME, parkinson's support group    Acceptance, E,TB, VU by  at 11/26/2018 10:04 AM    Comment:  OT POC, benefits of activity                               User Key     Initials Effective Dates Name Provider Type Discipline     06/22/15 -  Sam Liu, OTR/L Occupational Therapist OT     08/02/16 -  Yesenia Rios, VILLA/L Occupational Therapy Assistant OT                OT Recommendation and Plan  Outcome Summary/Treatment Plan (OT)  Daily Summary of Progress (OT): progress toward functional goals is good  Daily Summary of Progress (OT): progress toward functional goals is good  Outcome Measures     Row Name 11/27/18 1100 11/26/18 1007 11/26/18 1003       How much help from another person do you currently need...    Turning from your  back to your side while in flat bed without using bedrails?  --  4  -JE (r) TD (t) JE (c)  --    Moving from lying on back to sitting on the side of a flat bed without bedrails?  --  4  -JE (r) TD (t) JE (c)  --    Moving to and from a bed to a chair (including a wheelchair)?  --  3  -JE (r) TD (t) JE (c)  --    Standing up from a chair using your arms (e.g., wheelchair, bedside chair)?  --  4  -JE (r) TD (t) JE (c)  --    Climbing 3-5 steps with a railing?  --  3  -JE (r) TD (t) JE (c)  --    To walk in hospital room?  --  3  -JE (r) TD (t) JE (c)  --    AM-PAC 6 Clicks Score  --  21  -JE (r) TD (t)  --       How much help from another is currently needed...    Putting on and taking off regular lower body clothing?  3  -TS  --  3  -AC    Bathing (including washing, rinsing, and drying)  3  -TS  --  3  -AC    Toileting (which includes using toilet bed pan or urinal)  3  -TS  --  3  -AC    Putting on and taking off regular upper body clothing  4  -TS  --  4  -AC    Taking care of personal grooming (such as brushing teeth)  4  -TS  --  4  -AC    Eating meals  4  -TS  --  4  -AC    Score  21  -TS  --  21  -AC       Functional Assessment    Outcome Measure Options  AM-PAC 6 Clicks Daily Activity (OT)  -TS  AM-PAC 6 Clicks Basic Mobility (PT)  -JE (r) TD (t) JE (c)  AM-PAC 6 Clicks Daily Activity (OT)  -AC      User Key  (r) = Recorded By, (t) = Taken By, (c) = Cosigned By    Initials Name Provider Type    Sam Jules, OTR/L Occupational Therapist    Yesenia Crawford, VILLA/L Occupational Therapy Assistant    Milagros Zepeda, PT Physical Therapist    TD Theresa Wu, PT Student PT Student           Time Calculation:   Time Calculation- OT     Row Name 11/27/18 8167             Time Calculation- OT    OT Start Time  0951  -TS      OT Stop Time  1030  -TS      OT Time Calculation (min)  39 min  -TS      Total Timed Code Minutes- OT  39 minute(s)  -TS      OT Received On  11/27/18  -TS         Timed  Charges    27387 - OT Self Care/Mgmt Minutes  39  -TS        User Key  (r) = Recorded By, (t) = Taken By, (c) = Cosigned By    Initials Name Provider Type    TS Yesenia Rios COTA/L Occupational Therapy Assistant           Therapy Suggested Charges     Code   Minutes Charges    10596 (CPT®) Hc Ot Neuromusc Re Education Ea 15 Min      94929 (CPT®) Hc Ot Ther Proc Ea 15 Min      88373 (CPT®) Hc Ot Therapeutic Act Ea 15 Min      42581 (CPT®) Hc Ot Manual Therapy Ea 15 Min      04652 (CPT®) Hc Ot Iontophoresis Ea 15 Min      61460 (CPT®) Hc Ot Elec Stim Ea-Per 15 Min      49893 (CPT®) Hc Ot Ultrasound Ea 15 Min      66150 (CPT®) Hc Ot Self Care/Mgmt/Train Ea 15 Min 39 3    Total  39 3        Therapy Charges for Today     Code Description Service Date Service Provider Modifiers Qty    38443996880 HC OT SELF CARE/MGMT/TRAIN EA 15 MIN 11/27/2018 Yesenia Rios COTA/L GO, KX 3          OT G-codes  OT Professional Judgement Used?: Yes  OT Functional Scales Options: AM-PAC 6 Clicks Daily Activity (OT)  Score: 21  Functional Limitation: Self care  Self Care Current Status (): At least 20 percent but less than 40 percent impaired, limited or restricted  Self Care Goal Status (): At least 1 percent but less than 20 percent impaired, limited or restricted    AMADEO Williamson  11/27/2018

## 2018-11-27 NOTE — THERAPY DISCHARGE NOTE
Acute Care - Physical Therapy Discharge Summary  [facility]       Patient Name: Purnima Sutherland  : 1945  MRN: [id number]    Today's Date: 2018  Onset of Illness/Injury or Date of Surgery: 18    Date of Referral to PT: 18  Referring Physician: Yady PACK      Admit Date: 2018      PT Recommendation and Plan    Visit Dx:    ICD-10-CM ICD-9-CM   1. Fall, initial encounter W19.XXXA E888.9   2. Cystitis N30.90 595.9   3. Altered mental status, unspecified altered mental status type R41.82 780.97   4. Dysphagia, unspecified type R13.10 787.20   5. Decreased activities of daily living (ADL) R68.89 780.99   6. Impaired functional mobility, balance, gait, and endurance Z74.09 V49.89       Outcome Measures     Row Name 18 1100 18 1007 18 1003       How much help from another person do you currently need...    Turning from your back to your side while in flat bed without using bedrails?  --  4  -JE (r) TD (t) JE (c)  --    Moving from lying on back to sitting on the side of a flat bed without bedrails?  --  4  -JE (r) TD (t) JE (c)  --    Moving to and from a bed to a chair (including a wheelchair)?  --  3  -JE (r) TD (t) JE (c)  --    Standing up from a chair using your arms (e.g., wheelchair, bedside chair)?  --  4  -JE (r) TD (t) JE (c)  --    Climbing 3-5 steps with a railing?  --  3  -JE (r) TD (t) JE (c)  --    To walk in hospital room?  --  3  -JE (r) TD (t) JE (c)  --    AM-PAC 6 Clicks Score  --  21  -JE (r) TD (t)  --       How much help from another is currently needed...    Putting on and taking off regular lower body clothing?  3  -TS  --  3  -AC    Bathing (including washing, rinsing, and drying)  3  -TS  --  3  -AC    Toileting (which includes using toilet bed pan or urinal)  3  -TS  --  3  -AC    Putting on and taking off regular upper body clothing  4  -TS  --  4  -AC    Taking care of personal grooming (such as brushing teeth)  4  -TS  --  4  -AC     Eating meals  4  -TS  --  4  -AC    Score  21  -TS  --  21  -AC       Functional Assessment    Outcome Measure Options  AM-PAC 6 Clicks Daily Activity (OT)  -TS  AM-PAC 6 Clicks Basic Mobility (PT)  -JE (r) TD (t) JE (c)  AM-PAC 6 Clicks Daily Activity (OT)  -AC      User Key  (r) = Recorded By, (t) = Taken By, (c) = Cosigned By    Initials Name Provider Type    AC Sam Liu N, OTR/L Occupational Therapist    TS Yesenia Rios, VILLA/L Occupational Therapy Assistant    Milagros Zepeda, PT Physical Therapist    TD Theresa Wu, PT Student PT Student            Therapy Suggested Charges     Code   Minutes Charges    None             PT Rehab Goals     Row Name 11/27/18 1521             Bed Mobility Goal 1 (PT)    Activity/Assistive Device (Bed Mobility Goal 1, PT)  bed mobility activities, all  -KJ      [place] Level/Cues Needed (Bed Mobility Goal 1, PT)  independent  -KJ      Time Frame (Bed Mobility Goal 1, PT)  by discharge  -KJ      Progress/Outcomes (Bed Mobility Goal 1, PT)  goal not met  -KJ         Transfer Goal 1 (PT)    Activity/Assistive Device (Transfer Goal 1, PT)  transfers, all  -KJ      [place] Level/Cues Needed (Transfer Goal 1, PT)  supervision required  -KJ      Time Frame (Transfer Goal 1, PT)  by discharge  -KJ      Progress/Outcome (Transfer Goal 1, PT)  goal not met  -KJ         Gait Training Goal 1 (PT)    Activity/Assistive Device (Gait Training Goal 1, PT)  gait (walking locomotion);decrease fall risk;improve balance and speed;increase endurance/gait distance;increase energy conservation;diminish gait deviation  -KJ      [place] Level (Gait Training Goal 1, PT)  standby assist  -KJ      Distance (Gait Goal 1, PT)  200'  -KJ      Time Frame (Gait Training Goal 1, PT)  by discharge  -KJ      Progress/Outcome (Gait Training Goal 1, PT)  goal not met  -KJ         Stairs Goal 1 (PT)    Activity/Assistive Device (Stairs Goal 1, PT)  stairs, all skills  -KJ       [place] Level/Cues Needed (Stairs Goal 1, PT)  standby assist  -KJ      Number of Stairs (Stairs Goal 1, PT)  3  -KJ      Time Frame (Stairs Goal 1, PT)  by discharge  -KJ      Progress/Outcome (Stairs Goal 1, PT)  goal not met  -KJ        User Key  (r) = Recorded By, (t) = Taken By, (c) = Cosigned By    Initials Name Provider Type Discipline    Grace Jones, PTA Physical Therapy Assistant PT          Therapy Charges for Today     Code Description Service Date Service Provider Modifiers Qty    07950435111 HC GAIT TRAINING EA 15 MIN 11/26/2018 Grace Ogden, PTA GP, KX 1    72520286881 HC PT THER PROC EA 15 MIN 11/26/2018 Grace Ogden, PTA GP, KX 1          PT Discharge Summary  Anticipated Discharge Disposition (PT): home  Reason for Discharge: Discharge from facility  Outcomes Achieved: Refer to plan of care for updates on goals achieved  Discharge Destination: Home      Grace Ogden PTA   11/27/2018

## 2018-11-28 ENCOUNTER — READMISSION MANAGEMENT (OUTPATIENT)
Dept: CALL CENTER | Facility: HOSPITAL | Age: 73
End: 2018-11-28

## 2018-11-28 ENCOUNTER — TELEPHONE (OUTPATIENT)
Dept: NEUROLOGY | Age: 73
End: 2018-11-28

## 2018-11-28 LAB — MYOGLOBIN UR-MCNC: 3 NG/ML (ref 0–13)

## 2018-11-28 NOTE — OUTREACH NOTE
Prep Survey      Responses   Facility patient discharged from?  [facility]   Is patient eligible?  Yes   Discharge diagnosis  Acute nontraumatic rhabdomyolysis secondary to suspected fall, CPK 9522, myoglobin 2116   Does the patient have one of the following disease processes/diagnoses(primary or secondary)?  Other   Does the patient have Home health ordered?  Yes   What is the Home health agency?   [facility]   Is there a DME ordered?  No   Prep survey completed?  Yes          Vanessa Ariza RN

## 2018-11-29 ENCOUNTER — TELEPHONE (OUTPATIENT)
Dept: NEUROLOGY | Age: 73
End: 2018-11-29

## 2018-11-30 ENCOUNTER — READMISSION MANAGEMENT (OUTPATIENT)
Dept: CALL CENTER | Facility: HOSPITAL | Age: 73
End: 2018-11-30

## 2018-11-30 LAB
BACTERIA SPEC AEROBE CULT: NORMAL
BACTERIA SPEC AEROBE CULT: NORMAL

## 2018-11-30 NOTE — OUTREACH NOTE
Medical Week 1 Survey      Responses   Facility patient discharged from?  [facility]   Does the patient have one of the following disease processes/diagnoses(primary or secondary)?  Other   Is there a successful TCM telephone encounter documented?  No   Week 1 attempt successful?  No   Unsuccessful attempts  Attempt 1          Corinne Aceves RN

## 2018-11-30 NOTE — THERAPY DISCHARGE NOTE
Acute Care - Speech Language Pathology Discharge Summary  [facility]       Patient Name: Purnima Sutherland  : 1945  MRN: [id number]    Today's Date: 2018  Onset of Illness/Injury or Date of Surgery: 18       Referring Physician: Yady PACK        Admit Date: 2018      SLP Recommendation and Plan  Mechanical soft diet with thin liquids.  Tolu Lorenzo MS CCC-SLP 2018 8:51 AM    Visit Dx:    ICD-10-CM ICD-9-CM   1. Fall, initial encounter W19.XXXA E888.9   2. Cystitis N30.90 595.9   3. Altered mental status, unspecified altered mental status type R41.82 780.97   4. Dysphagia, unspecified type R13.10 787.20   5. Decreased activities of daily living (ADL) R68.89 780.99   6. Impaired functional mobility, balance, gait, and endurance Z74.09 V49.89               SLP GOALS     Row Name 18 0800             Oral Nutrition/Hydration Goal 1 (SLP)    Oral Nutrition/Hydration Goal 1, SLP  LTG: Patient will tolerate LRD without s/s of aspiration.  -CS      Time Frame (Oral Nutrition/Hydration Goal 1, SLP)  by discharge  -CS      Barriers (Oral Nutrition/Hydration Goal 1, SLP)  n/a  -CS      Progress/Outcomes (Oral Nutrition/Hydration Goal 1, SLP)  discharged from facility;goal partially met  -CS        User Key  (r) = Recorded By, (t) = Taken By, (c) = Cosigned By    Initials Name Provider Type    CS Tolu Lorenzo MS CCC-SLP Speech and Language Pathologist                  SLP Discharge Summary  Anticipated Dischage Disposition: home  Reason for Discharge: discharge from this facility  Progress Toward Achieving Short/long Term Goals: goals partially met within established timelines  Discharge Destination: home      Tolu Lorenzo MS CCC-SLP  2018

## 2018-12-03 ENCOUNTER — READMISSION MANAGEMENT (OUTPATIENT)
Dept: CALL CENTER | Facility: HOSPITAL | Age: 73
End: 2018-12-03

## 2018-12-03 NOTE — OUTREACH NOTE
Medical Week 1 Survey      Responses   Facility patient discharged from?  [facility]   Does the patient have one of the following disease processes/diagnoses(primary or secondary)?  Other   Is there a successful TCM telephone encounter documented?  No   Week 1 attempt successful?  Yes   Call start time  1032   Call end time  1038   Discharge diagnosis  Acute nontraumatic rhabdomyolysis secondary to suspected fall, CPK 9522, myoglobin 2116   Is patient permission given to speak with other caregiver?  Yes   List who call center can speak with  son   Meds reviewed with patient/caregiver?  Yes   Is the patient having any side effects they believe may be caused by any medication additions or changes?  No   Does the patient have all medications ordered at discharge?  Yes   Is the patient taking all medications as directed (includes completed medication regime)?  Yes   Does the patient have a primary care provider?   Yes   Does the patient have an appointment with their PCP within 7 days of discharge?  Greater than 7 days   Comments regarding PCP  Dr. Ritchie   What is preventing the patient from scheduling follow up appointments within 7 days of discharge?  Earlier appointment not available   Nursing Interventions  Verified appointment date/time/provider   What is the Home health agency?   [facility]   Has home health visited the patient within 72 hours of discharge?  No   Home health comments  Pt stated that a RN was coming this morning for the first time, but has declined  services. Pt was encouraged to have an open mind and allow the nurse to visit with her.   Psychosocial issues?  No   Did the patient receive a copy of their discharge instructions?  Yes   Nursing interventions  Reviewed instructions with patient   What is the patient's perception of their health status since discharge?  Improving   Is the patient/caregiver able to teach back signs and symptoms related to disease process for when to call PCP?  Yes   Is the  patient/caregiver able to teach back signs and symptoms related to disease process for when to call 911?  Yes   Is the patient/caregiver able to teach back the hierarchy of who to call/visit for symptoms/problems? PCP, Specialist, Home health nurse, Urgent Care, ED, 911  Yes   Week 1 call completed?  Yes          Grace Moon RN

## 2018-12-05 ENCOUNTER — TELEPHONE (OUTPATIENT)
Dept: NEUROLOGY | Age: 73
End: 2018-12-05

## 2018-12-07 ENCOUNTER — TELEPHONE (OUTPATIENT)
Dept: NEUROLOGY | Age: 73
End: 2018-12-07

## 2018-12-13 ENCOUNTER — LAB REQUISITION (OUTPATIENT)
Dept: LAB | Facility: HOSPITAL | Age: 73
End: 2018-12-13

## 2018-12-13 DIAGNOSIS — Z00.00 ENCOUNTER FOR GENERAL ADULT MEDICAL EXAMINATION WITHOUT ABNORMAL FINDINGS: ICD-10-CM

## 2018-12-13 LAB
BACTERIA UR QL AUTO: ABNORMAL /HPF
BILIRUB UR QL STRIP: NEGATIVE
CLARITY UR: ABNORMAL
COD CRY URNS QL: ABNORMAL /HPF
COLOR UR: ABNORMAL
GLUCOSE UR STRIP-MCNC: NEGATIVE MG/DL
HGB UR QL STRIP.AUTO: ABNORMAL
HYALINE CASTS UR QL AUTO: ABNORMAL /LPF
KETONES UR QL STRIP: ABNORMAL
LEUKOCYTE ESTERASE UR QL STRIP.AUTO: ABNORMAL
NITRITE UR QL STRIP: NEGATIVE
PH UR STRIP.AUTO: 6 [PH] (ref 5–8)
PROT UR QL STRIP: ABNORMAL
RBC # UR: ABNORMAL /HPF
REF LAB TEST METHOD: ABNORMAL
SP GR UR STRIP: >1.03 (ref 1–1.03)
SQUAMOUS #/AREA URNS HPF: ABNORMAL /HPF
UROBILINOGEN UR QL STRIP: ABNORMAL
WBC UR QL AUTO: ABNORMAL /HPF

## 2018-12-13 PROCEDURE — 81001 URINALYSIS AUTO W/SCOPE: CPT | Performed by: INTERNAL MEDICINE

## 2018-12-13 PROCEDURE — 87086 URINE CULTURE/COLONY COUNT: CPT | Performed by: INTERNAL MEDICINE

## 2018-12-15 LAB — BACTERIA SPEC AEROBE CULT: ABNORMAL

## 2018-12-19 ENCOUNTER — OFFICE VISIT (OUTPATIENT)
Dept: NEUROLOGY | Age: 73
End: 2018-12-19
Payer: MEDICARE

## 2018-12-19 VITALS — HEART RATE: 143 BPM | OXYGEN SATURATION: 92 % | BODY MASS INDEX: 23.91 KG/M2 | WEIGHT: 148.8 LBS | HEIGHT: 66 IN

## 2018-12-19 DIAGNOSIS — G25.81 RESTLESS LEG: ICD-10-CM

## 2018-12-19 DIAGNOSIS — G20 DYSKINESIA DUE TO PARKINSON'S DISEASE (HCC): ICD-10-CM

## 2018-12-19 DIAGNOSIS — G24.9 DYSKINESIA DUE TO PARKINSON'S DISEASE (HCC): ICD-10-CM

## 2018-12-19 DIAGNOSIS — R44.1 VISUAL HALLUCINATION: ICD-10-CM

## 2018-12-19 DIAGNOSIS — G20 PARKINSON DISEASE (HCC): Primary | ICD-10-CM

## 2018-12-19 DIAGNOSIS — G25.81 RESTLESS LEG SYNDROME: ICD-10-CM

## 2018-12-19 PROCEDURE — 3017F COLORECTAL CA SCREEN DOC REV: CPT | Performed by: PSYCHIATRY & NEUROLOGY

## 2018-12-19 PROCEDURE — 1123F ACP DISCUSS/DSCN MKR DOCD: CPT | Performed by: PSYCHIATRY & NEUROLOGY

## 2018-12-19 PROCEDURE — 1036F TOBACCO NON-USER: CPT | Performed by: PSYCHIATRY & NEUROLOGY

## 2018-12-19 PROCEDURE — G8484 FLU IMMUNIZE NO ADMIN: HCPCS | Performed by: PSYCHIATRY & NEUROLOGY

## 2018-12-19 PROCEDURE — G8400 PT W/DXA NO RESULTS DOC: HCPCS | Performed by: PSYCHIATRY & NEUROLOGY

## 2018-12-19 PROCEDURE — G8420 CALC BMI NORM PARAMETERS: HCPCS | Performed by: PSYCHIATRY & NEUROLOGY

## 2018-12-19 PROCEDURE — 99214 OFFICE O/P EST MOD 30 MIN: CPT | Performed by: PSYCHIATRY & NEUROLOGY

## 2018-12-19 PROCEDURE — 1101F PT FALLS ASSESS-DOCD LE1/YR: CPT | Performed by: PSYCHIATRY & NEUROLOGY

## 2018-12-19 PROCEDURE — G8427 DOCREV CUR MEDS BY ELIG CLIN: HCPCS | Performed by: PSYCHIATRY & NEUROLOGY

## 2018-12-19 PROCEDURE — 4040F PNEUMOC VAC/ADMIN/RCVD: CPT | Performed by: PSYCHIATRY & NEUROLOGY

## 2018-12-19 PROCEDURE — 1090F PRES/ABSN URINE INCON ASSESS: CPT | Performed by: PSYCHIATRY & NEUROLOGY

## 2019-01-07 ENCOUNTER — TELEPHONE (OUTPATIENT)
Dept: NEUROLOGY | Age: 74
End: 2019-01-07

## 2019-01-07 NOTE — TELEPHONE ENCOUNTER
Patient called stating pharmacy needs prescription card so she can continue getting the Rx at no cost. After looking in the chart [facility] was prior authorized.  Please complete or advise

## 2019-03-21 ENCOUNTER — OFFICE VISIT (OUTPATIENT)
Dept: NEUROLOGY | Age: 74
End: 2019-03-21
Payer: MEDICARE

## 2019-03-21 VITALS
SYSTOLIC BLOOD PRESSURE: 98 MMHG | HEART RATE: 74 BPM | HEIGHT: 66 IN | DIASTOLIC BLOOD PRESSURE: 65 MMHG | WEIGHT: 161 LBS | BODY MASS INDEX: 25.88 KG/M2

## 2019-03-21 DIAGNOSIS — G24.9 DYSKINESIA DUE TO PARKINSON'S DISEASE (HCC): ICD-10-CM

## 2019-03-21 DIAGNOSIS — G25.81 RESTLESS LEG SYNDROME: ICD-10-CM

## 2019-03-21 DIAGNOSIS — G20 PARKINSON DISEASE (HCC): Primary | ICD-10-CM

## 2019-03-21 DIAGNOSIS — G20 DYSKINESIA DUE TO PARKINSON'S DISEASE (HCC): ICD-10-CM

## 2019-03-21 PROCEDURE — G8427 DOCREV CUR MEDS BY ELIG CLIN: HCPCS | Performed by: PSYCHIATRY & NEUROLOGY

## 2019-03-21 PROCEDURE — G8417 CALC BMI ABV UP PARAM F/U: HCPCS | Performed by: PSYCHIATRY & NEUROLOGY

## 2019-03-21 PROCEDURE — 99214 OFFICE O/P EST MOD 30 MIN: CPT | Performed by: PSYCHIATRY & NEUROLOGY

## 2019-03-21 PROCEDURE — 1090F PRES/ABSN URINE INCON ASSESS: CPT | Performed by: PSYCHIATRY & NEUROLOGY

## 2019-03-21 PROCEDURE — 1101F PT FALLS ASSESS-DOCD LE1/YR: CPT | Performed by: PSYCHIATRY & NEUROLOGY

## 2019-03-21 PROCEDURE — 4040F PNEUMOC VAC/ADMIN/RCVD: CPT | Performed by: PSYCHIATRY & NEUROLOGY

## 2019-03-21 PROCEDURE — 1036F TOBACCO NON-USER: CPT | Performed by: PSYCHIATRY & NEUROLOGY

## 2019-03-21 PROCEDURE — G8400 PT W/DXA NO RESULTS DOC: HCPCS | Performed by: PSYCHIATRY & NEUROLOGY

## 2019-03-21 PROCEDURE — G8484 FLU IMMUNIZE NO ADMIN: HCPCS | Performed by: PSYCHIATRY & NEUROLOGY

## 2019-03-21 PROCEDURE — 1123F ACP DISCUSS/DSCN MKR DOCD: CPT | Performed by: PSYCHIATRY & NEUROLOGY

## 2019-04-01 RX ORDER — CARBIDOPA AND LEVODOPA 50; 200 MG/1; MG/1
TABLET, EXTENDED RELEASE ORAL
Qty: 90 TABLET | Refills: 11 | Status: SHIPPED | OUTPATIENT
Start: 2019-04-01 | End: 2020-03-10

## 2019-04-01 RX ORDER — PAROXETINE HYDROCHLORIDE 20 MG/1
TABLET, FILM COATED ORAL
Qty: 30 TABLET | Refills: 11 | Status: SHIPPED | OUTPATIENT
Start: 2019-04-01 | End: 2020-04-16

## 2019-04-01 NOTE — TELEPHONE ENCOUNTER
Requested Prescriptions     Pending Prescriptions Disp Refills    PARoxetine (PAXIL) 20 MG tablet 30 tablet 11     Sig: TAKE ONE TABLET BY MOUTH ONCE DAILY IN THE MORNING    carbidopa-levodopa (SINEMET CR)  MG per extended release tablet 90 tablet 11     Last OV  3/21/19  Next OV  6/21/19  Last Rx   Paxil -  5/25/18 with 5 refills       Sinemet - 4/11/18 with 11 refills

## 2019-05-13 ENCOUNTER — OFFICE VISIT (OUTPATIENT)
Dept: NEUROLOGY | Age: 74
End: 2019-05-13
Payer: MEDICARE

## 2019-05-13 VITALS
DIASTOLIC BLOOD PRESSURE: 60 MMHG | WEIGHT: 158 LBS | HEIGHT: 66 IN | BODY MASS INDEX: 25.39 KG/M2 | SYSTOLIC BLOOD PRESSURE: 100 MMHG | HEART RATE: 81 BPM

## 2019-05-13 DIAGNOSIS — G20 PARKINSON DISEASE (HCC): Primary | ICD-10-CM

## 2019-05-13 DIAGNOSIS — G20 DYSKINESIA DUE TO PARKINSON'S DISEASE (HCC): ICD-10-CM

## 2019-05-13 DIAGNOSIS — R44.1 VISUAL HALLUCINATION: ICD-10-CM

## 2019-05-13 DIAGNOSIS — G25.81 RESTLESS LEG SYNDROME: ICD-10-CM

## 2019-05-13 DIAGNOSIS — G24.9 DYSKINESIA DUE TO PARKINSON'S DISEASE (HCC): ICD-10-CM

## 2019-05-13 PROCEDURE — G8427 DOCREV CUR MEDS BY ELIG CLIN: HCPCS | Performed by: PSYCHIATRY & NEUROLOGY

## 2019-05-13 PROCEDURE — G8417 CALC BMI ABV UP PARAM F/U: HCPCS | Performed by: PSYCHIATRY & NEUROLOGY

## 2019-05-13 PROCEDURE — G8400 PT W/DXA NO RESULTS DOC: HCPCS | Performed by: PSYCHIATRY & NEUROLOGY

## 2019-05-13 PROCEDURE — 1036F TOBACCO NON-USER: CPT | Performed by: PSYCHIATRY & NEUROLOGY

## 2019-05-13 PROCEDURE — 99214 OFFICE O/P EST MOD 30 MIN: CPT | Performed by: PSYCHIATRY & NEUROLOGY

## 2019-05-13 PROCEDURE — 1090F PRES/ABSN URINE INCON ASSESS: CPT | Performed by: PSYCHIATRY & NEUROLOGY

## 2019-05-13 PROCEDURE — 4040F PNEUMOC VAC/ADMIN/RCVD: CPT | Performed by: PSYCHIATRY & NEUROLOGY

## 2019-05-13 PROCEDURE — 1123F ACP DISCUSS/DSCN MKR DOCD: CPT | Performed by: PSYCHIATRY & NEUROLOGY

## 2019-05-13 NOTE — PROGRESS NOTES
REVIEW OF SYSTEMS    Constitutional: []Fever []Sweats []Chills [] Recent Injury   [x] Denies all unless marked  HENT:[]Headache  [] Head Injury  [] Sore Throat  [] Ear Pain  [] Dizziness [] Hearing Loss   [x] Denies all unless marked  Spine:  [] Neck pain  [] Back pain  [] Sciaticia  [x] Denies all unless marked  Cardiovascular:[]Chest Pain []Palpitations [] Heart Disease  [x] Denies all unless marked  Pulmonary: []Shortness of Breath []Cough   [x] Denies all unless marked  Gastrointestinal:  []Abdominal Pain  []Blood in Stool  []Diarrhea []Constipation []Nausea  []Vomiting  [x] Denies all unless marked  Genitourinary:  [] Dysuria [] Frequency  [] Incontinence [] Urgency   [x] Denies all unless marked  Musculoskeletal: [] Arthralgia  [] Myalgias [x] Muscle cramps  [x] Muscle twitches   [] Denies all unless marked   Extremities:   [] Pain   [] Swelling   [x] Denies all unless marked  Skin:[] Rash  [] Color Change  [x] Denies all unless marked  Neurological:[] Visual Disturbance [] Double Vision [] Slurred Speech [] Trouble swallowing  [] Vertigo [] Tingling [] Numbness [] Weakness [] Loss of Balance   [] Loss of Consciousness [] Memory Loss [] Seizures  [x] Denies all unless marked  Psychiatric/Behavioral:[] Depression [] Anxiety  [x] Denies all unless marked  Sleep: []  Insomnia [] Sleep Disturbance [] Snoring [] Restless Legs [] Daytime Sleepiness [] Sleep Apnea  [x] Denies all unless marked

## 2019-05-14 NOTE — PROGRESS NOTES
Jaylin Steven is a 66y.o. year old female who is seen for evaluation of Parkinson's disease with occasional palpitations and dyskinesias. Currently taking regular Sinemet 25/100 and CR 50/200  one of each, 3 times a day. She is no longer on Requip. She continues on [place]. Visual hallucinations are rare. She continues to have prominent dyskinesias. She changed her mind about deep brain stimulation. On her last visit I wanted her to decrease her Sinemet CR to a half pill 3 times a day to help with dyskinesias. This has not been done. .restless leg symptoms and muscle cramps are doing better. She has a history of hallucinations related to [place]. Jaylin Steven is a 66 y.o. female with the following history as recorded in [facility]: There are no active problems to display for this patient. Current Outpatient Medications   Medication Sig Dispense Refill    Safinamide Mesylate (XADAGO) 50 MG TABS Take 1 tablet by mouth 2 times daily 60 tablet 11    carbidopa-levodopa (SINEMET)  MG per tablet Take 1 tablet by mouth 3 times daily 180 tablet 11    PARoxetine (PAXIL) 20 MG tablet TAKE ONE TABLET BY MOUTH ONCE DAILY IN THE MORNING 30 tablet 11    carbidopa-levodopa (SINEMET CR)  MG per extended release tablet TAKE ONE TABLET BY MOUTH THREE TIMES DAILY 90 tablet 11    levothyroxine (SYNTHROID) 75 MCG tablet Take 75 mcg by mouth daily        No current facility-administered medications for this visit. Allergies: Contrast [iodides] and Sulfa antibiotics  Past Medical History:   Diagnosis Date    Hyperthyroidism     Parkinson disease ([address]      Past Surgical History:   Procedure Laterality Date    BRAIN SURGERY      CHOLECYSTECTOMY      THYROIDECTOMY, PARTIAL       History reviewed. No pertinent family history.   Social History     Tobacco Use    Smoking status: Never Smoker    Smokeless tobacco: Never Used   Substance Use Topics    Alcohol use: No       All old records and recent tests

## 2019-05-17 ENCOUNTER — TELEPHONE (OUTPATIENT)
Dept: NEUROLOGY | Age: 74
End: 2019-05-17

## 2019-05-17 NOTE — TELEPHONE ENCOUNTER
Pt called and stated her medication was 500 dollars that was sent in. I know Dilshad Carpenter has assistance but could not see if she had the forms filled out for it.

## 2019-05-20 NOTE — TELEPHONE ENCOUNTER
I will reach out to her pharmacy tomorrow to see if this is possibly just unmet deductible or if this will be her out of pocket expense for this medication. Samples are waiting to be picked up until this is sorted out.

## 2019-05-21 NOTE — TELEPHONE ENCOUNTER
I contacted the patients pharmacy, the medication was prior authorized and is covered but she still has unmet deductible on top of what ever her usual co-pay is. I called the patient and discussed this with her, she will come and  some samples for now that were already set aside for her to help until her deductible is met. She states she should be able to afford the xadago after she fills another month or so of her other medications as this should take care of her remaining deductible.

## 2019-06-05 ENCOUNTER — TELEPHONE (OUTPATIENT)
Dept: NEUROLOGY | Age: 74
End: 2019-06-05

## 2019-06-05 NOTE — TELEPHONE ENCOUNTER
Called patient to let her know that her appointment for 06-21-19 with Dr. Liana Avalos had to be rescheduled. I have rescheduled patient to the same day, just moved the appointment time up. No answer. Left a VM regarding this information.

## 2019-06-21 ENCOUNTER — OFFICE VISIT (OUTPATIENT)
Dept: NEUROLOGY | Age: 74
End: 2019-06-21
Payer: MEDICARE

## 2019-06-21 VITALS
WEIGHT: 158.4 LBS | HEART RATE: 74 BPM | DIASTOLIC BLOOD PRESSURE: 64 MMHG | BODY MASS INDEX: 25.46 KG/M2 | SYSTOLIC BLOOD PRESSURE: 101 MMHG | RESPIRATION RATE: 16 BRPM | HEIGHT: 66 IN

## 2019-06-21 DIAGNOSIS — G20 PARKINSON DISEASE (HCC): Primary | ICD-10-CM

## 2019-06-21 DIAGNOSIS — G24.9 DYSKINESIA DUE TO PARKINSON'S DISEASE (HCC): ICD-10-CM

## 2019-06-21 DIAGNOSIS — G25.81 RESTLESS LEG SYNDROME: ICD-10-CM

## 2019-06-21 DIAGNOSIS — G20 DYSKINESIA DUE TO PARKINSON'S DISEASE (HCC): ICD-10-CM

## 2019-06-21 PROCEDURE — G8427 DOCREV CUR MEDS BY ELIG CLIN: HCPCS | Performed by: PSYCHIATRY & NEUROLOGY

## 2019-06-21 PROCEDURE — G8400 PT W/DXA NO RESULTS DOC: HCPCS | Performed by: PSYCHIATRY & NEUROLOGY

## 2019-06-21 PROCEDURE — 1123F ACP DISCUSS/DSCN MKR DOCD: CPT | Performed by: PSYCHIATRY & NEUROLOGY

## 2019-06-21 PROCEDURE — G8417 CALC BMI ABV UP PARAM F/U: HCPCS | Performed by: PSYCHIATRY & NEUROLOGY

## 2019-06-21 PROCEDURE — 4040F PNEUMOC VAC/ADMIN/RCVD: CPT | Performed by: PSYCHIATRY & NEUROLOGY

## 2019-06-21 PROCEDURE — 1036F TOBACCO NON-USER: CPT | Performed by: PSYCHIATRY & NEUROLOGY

## 2019-06-21 PROCEDURE — 1090F PRES/ABSN URINE INCON ASSESS: CPT | Performed by: PSYCHIATRY & NEUROLOGY

## 2019-06-21 PROCEDURE — 99214 OFFICE O/P EST MOD 30 MIN: CPT | Performed by: PSYCHIATRY & NEUROLOGY

## 2019-06-21 NOTE — PROGRESS NOTES
Keenan Kay is a 66y.o. year old female who is seen for evaluation of Parkinson's disease with occasional palpitations and dyskinesias. Currently taking regular Sinemet 25/100 and CR 50/200  one of each, 3 times a day. She continues to have some confusion as to whether or not she is taking a whole pill or 1/2 pill of the CR 50/200. She is no longer on Requip. She continues on [place]. Visual hallucinations are rare. She continues to have prominent dyskinesias. She changed her mind about deep brain stimulation. .restless leg symptoms and muscle cramps are doing better. She has a history of hallucinations related to [place]. Keenan Kay is a 66 y.o. female with the following history as [facility] in [facility]: There are no active problems to display for this patient. Current Outpatient Medications   Medication Sig Dispense Refill    Safinamide Mesylate (XADAGO) 50 MG TABS Take 1 tablet by mouth 2 times daily 60 tablet 11    carbidopa-levodopa (SINEMET)  MG per tablet Take 1 tablet by mouth 3 times daily 180 tablet 11    PARoxetine (PAXIL) 20 MG tablet TAKE ONE TABLET BY MOUTH ONCE DAILY IN THE MORNING 30 tablet 11    carbidopa-levodopa (SINEMET CR)  MG per extended release tablet TAKE ONE TABLET BY MOUTH THREE TIMES DAILY 90 tablet 11    levothyroxine (SYNTHROID) 75 MCG tablet Take 75 mcg by mouth daily        No current facility-administered medications for this visit. Allergies: Contrast [iodides]; Sulfa antibiotics; and Nickel  Past Medical History:   Diagnosis Date    Hyperthyroidism     Parkinson disease ([address]      Past Surgical History:   Procedure Laterality Date    BRAIN SURGERY      CHOLECYSTECTOMY      THYROIDECTOMY, PARTIAL       History reviewed. No pertinent family history. Social History     Tobacco Use    Smoking status: Never Smoker    Smokeless tobacco: Never Used   Substance Use Topics    Alcohol use: No       All old records and recent tests reviewed.   We had an extended discussion regarding these. Review of Systems    Constitutional: []Fever []Sweats []Chills [] Recent Injury   [x] Denies all unless marked  HENT:[]Headache  [] Head Injury  [] Sore Throat  [] Ear Pain  [] Dizziness [] Hearing Loss   [x] Denies all unless marked  Spine:  [] Neck pain  [] Back pain  [] Sciaticia  [x] Denies all unless marked  Cardiovascular:[]Chest Pain []Palpitations [] Heart Disease  [x] Denies all unless marked  Pulmonary: []Shortness of Breath []Cough   [x] Denies all unless marked  Gastrointestinal:  []Abdominal Pain  []Blood in Stool  []Diarrhea []Constipation []Nausea  []Vomiting  [x] Denies all unless marked  Genitourinary:  [] Dysuria [] Frequency  [] Incontinence [] Urgency   [x] Denies all unless marked  Musculoskeletal: [] Arthralgia  [] Myalgias [] Muscle cramps  [] Muscle twitches   [x] Denies all unless marked   Extremities:   [] Pain   [] Swelling   [x] Denies all unless marked  Skin:[] Rash  [] Color Change  [x] Denies all unless marked  Neurological:[] Visual Disturbance [] Double Vision [] Slurred Speech [] Trouble swallowing  [] Vertigo [] Tingling [] Numbness [] Weakness [] Loss of Balance   [] Loss of Consciousness [] Memory Loss [] Seizures  [x] Denies all unless marked  Psychiatric/Behavioral:[] Depression [] Anxiety  [x] Denies all unless marked  Sleep: []  Insomnia [] Sleep Disturbance [] Snoring [] Restless Legs [] Daytime Sleepiness [] Sleep Apnea  [x] Denies all unless marked            /64   Pulse 74   Resp 16   Ht 5' 6\" (1.676 m)   Wt 158 lb 6.4 oz (71.8 kg)   BMI 25.57 kg/m²     Constitutional - well developed, well nourished. HEENT - head normocephalic. Eyes - conjunctiva normal.  EOMS normal.  Neck- ROM appears normal, no tracheal deviation. Extremities -no edema     Musculoskeletal - ROM appears normal.  No significant edema. Skin - warm, dry, and intact. No rash, erythema, or pallor.   Psychiatric - mood, affect, and behavior

## 2019-06-27 ENCOUNTER — APPOINTMENT (OUTPATIENT)
Dept: GENERAL RADIOLOGY | Facility: HOSPITAL | Age: 74
End: 2019-06-27

## 2019-06-27 ENCOUNTER — APPOINTMENT (OUTPATIENT)
Dept: CT IMAGING | Facility: HOSPITAL | Age: 74
End: 2019-06-27

## 2019-06-27 ENCOUNTER — HOSPITAL ENCOUNTER (EMERGENCY)
Facility: HOSPITAL | Age: 74
Discharge: HOME | End: 2019-06-27
Admitting: EMERGENCY MEDICINE

## 2019-06-27 VITALS
RESPIRATION RATE: 16 BRPM | DIASTOLIC BLOOD PRESSURE: 79 MMHG | HEART RATE: 82 BPM | HEIGHT: 66 IN | SYSTOLIC BLOOD PRESSURE: 166 MMHG | WEIGHT: 160.8 LBS | BODY MASS INDEX: 25.84 KG/M2 | OXYGEN SATURATION: 98 % | TEMPERATURE: 98.1 F

## 2019-06-27 DIAGNOSIS — S42.032A CLOSED DISPLACED FRACTURE OF ACROMIAL END OF LEFT CLAVICLE, INITIAL ENCOUNTER: Primary | ICD-10-CM

## 2019-06-27 PROCEDURE — 73030 X-RAY EXAM OF SHOULDER: CPT

## 2019-06-27 PROCEDURE — 99283 EMERGENCY DEPT VISIT LOW MDM: CPT

## 2019-06-27 PROCEDURE — 70450 CT HEAD/BRAIN W/O DYE: CPT

## 2019-06-27 PROCEDURE — 72125 CT NECK SPINE W/O DYE: CPT

## 2019-06-27 PROCEDURE — 73000 X-RAY EXAM OF COLLAR BONE: CPT

## 2019-06-27 RX ORDER — HYDROCODONE BITARTRATE AND ACETAMINOPHEN 5; 325 MG/1; MG/1
1 TABLET ORAL EVERY 4 HOURS PRN
Qty: 15 TABLET | Refills: 0 | Status: ON HOLD | OUTPATIENT
Start: 2019-06-27 | End: 2021-04-27

## 2019-07-01 NOTE — ED PROVIDER NOTES
Subjective     Fall   Mechanism of injury: fall    Injury location:  Shoulder/arm  Shoulder/arm injury location:  L shoulder  Incident location:  Home  Arrived directly from scene: yes    Fall:     Fall occurred:  Walking (slipped on wet surface, reports slipping on coffee causing the fall)    Impact surface:  Hard floor (reports hitting head on brick wall as she was falling down without LOC)  Suspicion of alcohol use: no    Suspicion of drug use: no    Prior to arrival data:     Loss of consciousness: no    Associated symptoms: no abdominal pain, no back pain, no chest pain, no headaches, no loss of consciousness, no neck pain, no seizures and no vomiting    Risk factors: no anticoagulation therapy, no CHF, no COPD, no diabetes, no dialysis and no hemophilia    Risk factors comment:  Hx of parkinson's with tremor and fall risk      Review of Systems   Constitutional: Negative.  Negative for fever.   HENT: Negative.  Negative for congestion.    Eyes: Negative.    Respiratory: Negative.  Negative for cough and shortness of breath.    Cardiovascular: Negative for chest pain.   Gastrointestinal: Negative for abdominal pain and vomiting.   Endocrine: Negative.    Genitourinary: Negative.    Musculoskeletal: Negative for back pain, neck pain and neck stiffness.        Positive for left shoulder/ left clavicle pain   Skin: Negative.  Negative for pallor, rash and wound.   Allergic/Immunologic: Negative.    Neurological: Negative.  Negative for seizures, loss of consciousness and headaches.   Hematological: Negative.    Psychiatric/Behavioral: Negative.    All other systems reviewed and are negative.      Past Medical History:   Diagnosis Date   • Depression    • Disease of thyroid gland    • Parkinson disease (CMS/HCC)        Allergies   Allergen Reactions   • Iodinated Diagnostic Agents    • Sulfa Antibiotics        Past Surgical History:   Procedure Laterality Date   • CHOLECYSTECTOMY     • CYST REMOVAL         No  family history on file.    Social History     Socioeconomic History   • Marital status:      Spouse name: Not on file   • Number of children: Not on file   • Years of education: Not on file   • Highest education level: Not on file   Tobacco Use   • Smoking status: Never Smoker   Substance and Sexual Activity   • Alcohol use: No   • Drug use: No   • Sexual activity: Defer           Objective   Physical Exam   Constitutional: She is oriented to person, place, and time. She appears well-developed and well-nourished.   HENT:   Head: Normocephalic and atraumatic.   Right Ear: External ear normal.   Left Ear: External ear normal.   Nose: Nose normal.   Eyes: Conjunctivae and EOM are normal. Pupils are equal, round, and reactive to light.   Neck: Normal range of motion. Neck supple.   Cardiovascular: Normal rate, regular rhythm, normal heart sounds and intact distal pulses.   Pulmonary/Chest: Effort normal and breath sounds normal.   Abdominal: Soft. Bowel sounds are normal.   Musculoskeletal: Normal range of motion. She exhibits tenderness.   Pain to palpation to the left shoulder with limited range of motion secondary to pain, no obvious deformity noted, neurovascular intact   Neurological: She is alert and oriented to person, place, and time.   Skin: Skin is warm and dry. Capillary refill takes less than 2 seconds.   Psychiatric: She has a normal mood and affect. Her behavior is normal. Judgment and thought content normal.   Nursing note and vitals reviewed.      Procedures           ED Course patient placed in sling and recommendations to f/u with orthopedics for further evaluation. CT scans of head and neck are both read as negative for acute findings.                  MDM  Number of Diagnoses or Management Options  Closed displaced fracture of acromial end of left clavicle, initial encounter: new and requires workup     Amount and/or Complexity of Data Reviewed  Tests in the radiology section of CPT®: ordered  and reviewed  Discuss the patient with other providers: yes    Risk of Complications, Morbidity, and/or Mortality  Presenting problems: moderate  Diagnostic procedures: moderate  Management options: moderate    Patient Progress  Patient progress: improved        Final diagnoses:   Closed displaced fracture of acromial end of left clavicle, initial encounter            Yesenia Ritchie, SIRENA  07/01/19 1028

## 2019-08-01 ENCOUNTER — TELEPHONE (OUTPATIENT)
Dept: NEUROLOGY | Age: 74
End: 2019-08-01

## 2019-08-01 NOTE — TELEPHONE ENCOUNTER
Patient stating that she was taking sinemet 25-100mg 1 pill TID and the sinemet 50-200mg half tablet TID. She wanted to go back to taking her sinemet  1 pill TID because she felt that this worked better for her. I let her know it would be ok to do this and to call us back if she didn't get any improvement or has any further problems. Patient voiced understanding.

## 2019-09-10 ENCOUNTER — TELEPHONE (OUTPATIENT)
Dept: NEUROLOGY | Age: 74
End: 2019-09-10

## 2019-09-26 ENCOUNTER — TELEPHONE (OUTPATIENT)
Dept: NEUROLOGY | Age: 74
End: 2019-09-26

## 2019-09-26 NOTE — TELEPHONE ENCOUNTER
Patient called stating that she was seeing people in her home and they have been there for 3 months and she is ready to move out. She knows the people aren't really there but she stated she is tired of seeing them. She wants to know what Dr. Nile Chandler recommends. I [facility] with [facility] [place] and Dr. Nile Chandler and they thought it would be beneficial to change her meds and have her come in sooner. A new appointment has been scheduled for 10/10/19. Called patient and left voicemail  About new appointment.

## 2019-09-30 NOTE — TELEPHONE ENCOUNTER
Patient son Chrissie Vance called back today statign that he was been trying to call multiple times since Thursday I apologized and gave him my direct line. I updated him on the call we received from his mother and told him of her new appointment he voiced understanding. Laith Polanco also said his mother had a visit at [facility] and they concluded that a few of her medications were causing her to have hallucinations she she has stopped them but he didn't know which ones. He said patient only wants to take necessary medication and doesn't want anything added. Laith Polanco stated his mother is thinking about changing providers.

## 2019-10-10 ENCOUNTER — OFFICE VISIT (OUTPATIENT)
Dept: NEUROLOGY | Age: 74
End: 2019-10-10
Payer: MEDICARE

## 2019-10-10 VITALS
HEIGHT: 66 IN | WEIGHT: 162 LBS | DIASTOLIC BLOOD PRESSURE: 86 MMHG | SYSTOLIC BLOOD PRESSURE: 152 MMHG | BODY MASS INDEX: 26.03 KG/M2 | HEART RATE: 75 BPM

## 2019-10-10 DIAGNOSIS — G20 PARKINSON DISEASE (HCC): Primary | ICD-10-CM

## 2019-10-10 DIAGNOSIS — G20 DYSKINESIA DUE TO PARKINSON'S DISEASE (HCC): ICD-10-CM

## 2019-10-10 DIAGNOSIS — R44.1 VISUAL HALLUCINATION: ICD-10-CM

## 2019-10-10 DIAGNOSIS — G24.9 DYSKINESIA DUE TO PARKINSON'S DISEASE (HCC): ICD-10-CM

## 2019-10-10 PROCEDURE — G8484 FLU IMMUNIZE NO ADMIN: HCPCS | Performed by: PSYCHIATRY & NEUROLOGY

## 2019-10-10 PROCEDURE — 1036F TOBACCO NON-USER: CPT | Performed by: PSYCHIATRY & NEUROLOGY

## 2019-10-10 PROCEDURE — 1090F PRES/ABSN URINE INCON ASSESS: CPT | Performed by: PSYCHIATRY & NEUROLOGY

## 2019-10-10 PROCEDURE — G8417 CALC BMI ABV UP PARAM F/U: HCPCS | Performed by: PSYCHIATRY & NEUROLOGY

## 2019-10-10 PROCEDURE — 99214 OFFICE O/P EST MOD 30 MIN: CPT | Performed by: PSYCHIATRY & NEUROLOGY

## 2019-10-10 PROCEDURE — 1123F ACP DISCUSS/DSCN MKR DOCD: CPT | Performed by: PSYCHIATRY & NEUROLOGY

## 2019-10-10 PROCEDURE — 4040F PNEUMOC VAC/ADMIN/RCVD: CPT | Performed by: PSYCHIATRY & NEUROLOGY

## 2019-10-10 PROCEDURE — G8427 DOCREV CUR MEDS BY ELIG CLIN: HCPCS | Performed by: PSYCHIATRY & NEUROLOGY

## 2019-10-10 PROCEDURE — G8400 PT W/DXA NO RESULTS DOC: HCPCS | Performed by: PSYCHIATRY & NEUROLOGY

## 2019-10-22 ENCOUNTER — TELEPHONE (OUTPATIENT)
Dept: NEUROLOGY | Age: 74
End: 2019-10-22

## 2019-11-27 ENCOUNTER — TELEPHONE (OUTPATIENT)
Dept: NEUROLOGY | Age: 74
End: 2019-11-27

## 2019-12-04 ENCOUNTER — TELEPHONE (OUTPATIENT)
Dept: NEUROLOGY | Age: 74
End: 2019-12-04

## 2019-12-16 ENCOUNTER — TELEPHONE (OUTPATIENT)
Dept: NEUROLOGY | Age: 74
End: 2019-12-16

## 2020-01-01 ENCOUNTER — APPOINTMENT (OUTPATIENT)
Dept: CT IMAGING | Facility: HOSPITAL | Age: 75
End: 2020-01-01

## 2020-01-01 ENCOUNTER — HOSPITAL ENCOUNTER (EMERGENCY)
Facility: HOSPITAL | Age: 75
Discharge: HOME | End: 2020-01-02
Admitting: INTERNAL MEDICINE

## 2020-01-01 ENCOUNTER — APPOINTMENT (OUTPATIENT)
Dept: GENERAL RADIOLOGY | Facility: HOSPITAL | Age: 75
End: 2020-01-01

## 2020-01-01 DIAGNOSIS — N30.00 ACUTE CYSTITIS WITHOUT HEMATURIA: Primary | ICD-10-CM

## 2020-01-01 LAB
ALBUMIN SERPL-MCNC: 3.8 G/DL (ref 3.5–5.2)
ALBUMIN/GLOB SERPL: 1.3 G/DL
ALP SERPL-CCNC: 75 U/L (ref 39–117)
ALT SERPL W P-5'-P-CCNC: <5 U/L (ref 1–33)
ANION GAP SERPL CALCULATED.3IONS-SCNC: 11 MMOL/L (ref 5–15)
AST SERPL-CCNC: 20 U/L (ref 1–32)
BACTERIA UR QL AUTO: ABNORMAL /HPF
BASOPHILS # BLD AUTO: 0.02 10*3/MM3 (ref 0–0.2)
BASOPHILS NFR BLD AUTO: 0.2 % (ref 0–1.5)
BILIRUB SERPL-MCNC: 0.4 MG/DL (ref 0.2–1.2)
BILIRUB UR QL STRIP: NEGATIVE
BUN BLD-MCNC: 15 MG/DL (ref 8–23)
BUN/CREAT SERPL: 17.2 (ref 7–25)
CALCIUM SPEC-SCNC: 9.1 MG/DL (ref 8.6–10.5)
CHLORIDE SERPL-SCNC: 99 MMOL/L (ref 98–107)
CLARITY UR: CLEAR
CO2 SERPL-SCNC: 29 MMOL/L (ref 22–29)
COLOR UR: YELLOW
CREAT BLD-MCNC: 0.87 MG/DL (ref 0.57–1)
DEPRECATED RDW RBC AUTO: 45.8 FL (ref 37–54)
EOSINOPHIL # BLD AUTO: 0.19 10*3/MM3 (ref 0–0.4)
EOSINOPHIL NFR BLD AUTO: 2.1 % (ref 0.3–6.2)
ERYTHROCYTE [DISTWIDTH] IN BLOOD BY AUTOMATED COUNT: 13.5 % (ref 12.3–15.4)
FLUAV AG NPH QL: NEGATIVE
FLUBV AG NPH QL IA: NEGATIVE
GFR SERPL CREATININE-BSD FRML MDRD: 63 ML/MIN/1.73
GLOBULIN UR ELPH-MCNC: 2.9 GM/DL
GLUCOSE BLD-MCNC: 91 MG/DL (ref 65–99)
GLUCOSE UR STRIP-MCNC: NEGATIVE MG/DL
HCT VFR BLD AUTO: 39.1 % (ref 34–46.6)
HGB BLD-MCNC: 13.3 G/DL (ref 12–15.9)
HGB UR QL STRIP.AUTO: ABNORMAL
HYALINE CASTS UR QL AUTO: ABNORMAL /LPF
IMM GRANULOCYTES # BLD AUTO: 0.02 10*3/MM3 (ref 0–0.05)
IMM GRANULOCYTES NFR BLD AUTO: 0.2 % (ref 0–0.5)
KETONES UR QL STRIP: NEGATIVE
LEUKOCYTE ESTERASE UR QL STRIP.AUTO: ABNORMAL
LYMPHOCYTES # BLD AUTO: 2.35 10*3/MM3 (ref 0.7–3.1)
LYMPHOCYTES NFR BLD AUTO: 25.6 % (ref 19.6–45.3)
MCH RBC QN AUTO: 31.9 PG (ref 26.6–33)
MCHC RBC AUTO-ENTMCNC: 34 G/DL (ref 31.5–35.7)
MCV RBC AUTO: 93.8 FL (ref 79–97)
MONOCYTES # BLD AUTO: 0.86 10*3/MM3 (ref 0.1–0.9)
MONOCYTES NFR BLD AUTO: 9.4 % (ref 5–12)
NEUTROPHILS # BLD AUTO: 5.75 10*3/MM3 (ref 1.7–7)
NEUTROPHILS NFR BLD AUTO: 62.5 % (ref 42.7–76)
NITRITE UR QL STRIP: NEGATIVE
NRBC BLD AUTO-RTO: 0 /100 WBC (ref 0–0.2)
PH UR STRIP.AUTO: 5.5 [PH] (ref 5–8)
PLATELET # BLD AUTO: 255 10*3/MM3 (ref 140–450)
PMV BLD AUTO: 9.1 FL (ref 6–12)
POTASSIUM BLD-SCNC: 4.9 MMOL/L (ref 3.5–5.2)
PROT SERPL-MCNC: 6.7 G/DL (ref 6–8.5)
PROT UR QL STRIP: NEGATIVE
RBC # BLD AUTO: 4.17 10*6/MM3 (ref 3.77–5.28)
RBC # UR: ABNORMAL /HPF
REF LAB TEST METHOD: ABNORMAL
SODIUM BLD-SCNC: 139 MMOL/L (ref 136–145)
SP GR UR STRIP: 1.01 (ref 1–1.03)
SQUAMOUS #/AREA URNS HPF: ABNORMAL /HPF
UROBILINOGEN UR QL STRIP: ABNORMAL
WBC NRBC COR # BLD: 9.19 10*3/MM3 (ref 3.4–10.8)
WBC UR QL AUTO: ABNORMAL /HPF

## 2020-01-01 PROCEDURE — 87086 URINE CULTURE/COLONY COUNT: CPT

## 2020-01-01 PROCEDURE — 70450 CT HEAD/BRAIN W/O DYE: CPT

## 2020-01-01 PROCEDURE — 71045 X-RAY EXAM CHEST 1 VIEW: CPT

## 2020-01-01 PROCEDURE — 87804 INFLUENZA ASSAY W/OPTIC: CPT | Performed by: NURSE PRACTITIONER

## 2020-01-01 PROCEDURE — 81001 URINALYSIS AUTO W/SCOPE: CPT

## 2020-01-01 PROCEDURE — 99284 EMERGENCY DEPT VISIT MOD MDM: CPT

## 2020-01-01 PROCEDURE — 80053 COMPREHEN METABOLIC PANEL: CPT | Performed by: NURSE PRACTITIONER

## 2020-01-01 PROCEDURE — 36415 COLL VENOUS BLD VENIPUNCTURE: CPT | Performed by: NURSE PRACTITIONER

## 2020-01-01 PROCEDURE — 85025 COMPLETE CBC W/AUTO DIFF WBC: CPT | Performed by: NURSE PRACTITIONER

## 2020-01-01 PROCEDURE — 36415 COLL VENOUS BLD VENIPUNCTURE: CPT

## 2020-01-01 RX ORDER — SODIUM CHLORIDE 0.9 % (FLUSH) 0.9 %
10 SYRINGE (ML) INJECTION AS NEEDED
Status: DISCONTINUED | OUTPATIENT
Start: 2020-01-01 | End: 2020-01-02 | Stop reason: HOSPADM

## 2020-01-02 VITALS
SYSTOLIC BLOOD PRESSURE: 134 MMHG | BODY MASS INDEX: 25.07 KG/M2 | HEART RATE: 66 BPM | WEIGHT: 156 LBS | OXYGEN SATURATION: 98 % | RESPIRATION RATE: 16 BRPM | TEMPERATURE: 98.1 F | HEIGHT: 66 IN | DIASTOLIC BLOOD PRESSURE: 81 MMHG

## 2020-01-02 RX ORDER — CEFDINIR 300 MG/1
300 CAPSULE ORAL 2 TIMES DAILY
Qty: 14 CAPSULE | Refills: 0 | Status: SHIPPED | OUTPATIENT
Start: 2020-01-02 | End: 2020-01-09
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
## 2020-01-03 LAB — BACTERIA SPEC AEROBE CULT: NORMAL

## 2020-01-13 ENCOUNTER — TELEPHONE (OUTPATIENT)
Dept: NEUROSURGERY | Age: 75
End: 2020-01-13

## 2020-01-13 NOTE — TELEPHONE ENCOUNTER
Cheko Richardson son Kevin Cohen  requests that nurse  return their call. The best time to reach her is Anytime. Patient son said the patient symptom are worse. Needs to see if something can be done or if they need to see Dr Rosamaria Muro. Thank you.

## 2020-02-26 ENCOUNTER — OFFICE VISIT (OUTPATIENT)
Dept: NEUROSURGERY | Age: 75
End: 2020-02-26
Payer: MEDICARE

## 2020-02-26 VITALS — SYSTOLIC BLOOD PRESSURE: 151 MMHG | DIASTOLIC BLOOD PRESSURE: 85 MMHG | HEART RATE: 81 BPM

## 2020-02-26 PROCEDURE — 1123F ACP DISCUSS/DSCN MKR DOCD: CPT | Performed by: PSYCHIATRY & NEUROLOGY

## 2020-02-26 PROCEDURE — G8400 PT W/DXA NO RESULTS DOC: HCPCS | Performed by: PSYCHIATRY & NEUROLOGY

## 2020-02-26 PROCEDURE — 99214 OFFICE O/P EST MOD 30 MIN: CPT | Performed by: PSYCHIATRY & NEUROLOGY

## 2020-02-26 PROCEDURE — 4040F PNEUMOC VAC/ADMIN/RCVD: CPT | Performed by: PSYCHIATRY & NEUROLOGY

## 2020-02-26 PROCEDURE — G8484 FLU IMMUNIZE NO ADMIN: HCPCS | Performed by: PSYCHIATRY & NEUROLOGY

## 2020-02-26 PROCEDURE — G8427 DOCREV CUR MEDS BY ELIG CLIN: HCPCS | Performed by: PSYCHIATRY & NEUROLOGY

## 2020-02-26 PROCEDURE — 1090F PRES/ABSN URINE INCON ASSESS: CPT | Performed by: PSYCHIATRY & NEUROLOGY

## 2020-02-26 PROCEDURE — G8417 CALC BMI ABV UP PARAM F/U: HCPCS | Performed by: PSYCHIATRY & NEUROLOGY

## 2020-02-26 PROCEDURE — 1036F TOBACCO NON-USER: CPT | Performed by: PSYCHIATRY & NEUROLOGY

## 2020-02-26 RX ORDER — CALCIUM CARBONATE 500(1250)
500 TABLET ORAL DAILY
COMMUNITY
End: 2022-06-22

## 2020-02-26 RX ORDER — GABAPENTIN 100 MG/1
100 CAPSULE ORAL NIGHTLY
Qty: 90 CAPSULE | Refills: 1 | Status: SHIPPED | OUTPATIENT
Start: 2020-02-26 | End: 2020-04-16

## 2020-02-26 RX ORDER — LORATADINE 10 MG/1
10 CAPSULE, LIQUID FILLED ORAL DAILY
COMMUNITY
End: 2022-06-22

## 2020-02-26 RX ORDER — IBUPROFEN 200 MG
200 TABLET ORAL EVERY 6 HOURS PRN
COMMUNITY
End: 2020-12-07

## 2020-02-26 RX ORDER — QUETIAPINE FUMARATE 25 MG/1
12.5 TABLET, FILM COATED ORAL EVERY EVENING
Qty: 45 TABLET | Refills: 3 | Status: SHIPPED | OUTPATIENT
Start: 2020-02-26 | End: 2020-04-16 | Stop reason: SDUPTHER

## 2020-02-26 NOTE — PROGRESS NOTES
levothyroxine (SYNTHROID) 75 MCG tablet Take 75 mcg by mouth daily       Safinamide Mesylate (XADAGO) 50 MG TABS Take 1 tablet by mouth 2 times daily (Patient not taking: Reported on 2/26/2020) 60 tablet 11     No current facility-administered medications for this visit. Allergies   Allergen Reactions    Contrast [Iodides]     Sulfa Antibiotics     Nickel Rash         REVIEW OF SYSTEMS     Constitutional:  Denies fever or chills   Eyes:  Denies change in visual acuity   HENT:  Denies nasal congestion or sore throat   Respiratory:  Denies cough or shortness of breath   Cardiovascular:  Denies chest pain or edema   GI:  Denies abdominal pain, nausea, vomiting, bloody stools or diarrhea   :  Denies dysuria   Musculoskeletal:  Denies back pain   Integument:  Denies rash   Neurologic:  Denies headache, focal weakness or sensory changes   Endocrine:  Denies polyuria or polydipsia   Lymphatic:  Denies swollen glands   Psychiatric:  Denies depression or anxiety      I have reviewed the above ROS with the patient and agree with the ROS as documented above. PHYSICAL EXAM    Constitutional -   BP (!) 151/85   Pulse 81 , RR 14  General appearance: No acute distress   EYES -   Conjunctiva normal  Pupillary exam as below, see CN exam in the neurologic exam  ENT-    No scars, masses, or lesions over external nose or ears  Hearing normal bilaterally to finger rub  Cardiovascular -   No clubbing, cyanosis, or edema   Pulmonary-   Good expansion, normal effort without use of accessory muscles  Musculoskeletal -   No significant wasting of muscles noted  Gait as below, see gait exam in the neurologic exam  Muscle strength, tone, stability as below. No bony deformities  Skin -   Warm, dry, and intact to inspection and palpation.     No rash, erythema, or pallor  Psychiatric -   Mood, affect, and behavior appear normal    Memory as below see mental status examination in the neurologic exam    NEUROLOGICAL EXAM    Mental status   [x]Awake, alert, oriented   [x]Affect attention and concentration appear appropriate  [x]Recent and remote memory appears unremarkable  [x]Speech normal without dysarthria or aphasia, comprehension and repetition intact. COMMENTS:    Cranial Nerves [x]No VF deficit to confrontation,  no papilledema on fundoscopic exam.  [x]PERRLA, EOMI, no nystagmus, conjugate eye movements, no ptosis  [x]Face symmetric  [x]Facial sensation intact  [x]Tongue midline no atrophy or fasciculations present  [x]Palate midline, hearing to finger rub normal bilaterally  [x]Shoulder shrug and SCM testing normal bilaterally  COMMENTS:   Motor   []5/5 strength x 4 extremities  []Normal bulk and tone  []No tremor present  []No rigidity or bradykinesia noted  COMMENTS: Resting tremor, rigidity, bradykinesia noted, 4/5 globlayy   Sensory  []Sensation intact to light touch, pin prick, vibration, and proprioception BLE  []Sensation intact to light touch, pin prick, vibration, and proprioception BUE  COMMENTS: Decreased LT, PP, Vib BLE   Coordination [x]FTN normal bilaterally   []HTS normal bilaterally  []TAL normal bilaterally. COMMENTS:   Reflexes  [x]Symmetric and non-pathological  [x]Toes down going bilaterally  [x]No clonus present  COMMENTS:   Gait                  []Normal steady gait    []Ataxic    []Spastic     []Magnetic     [x]Shuffling  COMMENTS:       LABS RECORD AND IMAGING REVIEW (As below and per HPI)      ASSESSMENT:    Bean Mccarthy is a 66y.o. year old female here for Parkinson's disease. Previously followed at [facility] and by Dr. Ashlie Peralta. The patient was initially diagnosed 20 years ago. She has been fairly stable on her current regimen of short and long-acting Sinemet. She is not been able to tolerate dopamine agonists in the past (requip). She is also tried MAO inhibitors as well (xadago). She does note underlying hallucinations which are a concern for her.   She has been tried on Standard Zavala which was not beneficial.  Also noting more RLS sympotms at night. PLAN:  1. Continue sinemet 25/100 1 tab qid  2. Continue sinemet CR 50/200 1 tab qid  3. Will stop Nuplazid, start low dose seroquel 12.5 mg in the evening for hallucinations. 4.  Will add neurontin 100 mg qhs has well for RLS. Patient unable to tolerate requip previously. 5.  Fall precautions, use walker.      Cipriano Draper,   Board Certified Neurology

## 2020-03-05 ENCOUNTER — TELEPHONE (OUTPATIENT)
Dept: NEUROLOGY | Age: 75
End: 2020-03-05

## 2020-03-13 ENCOUNTER — TELEPHONE (OUTPATIENT)
Dept: NEUROSURGERY | Age: 75
End: 2020-03-13

## 2020-03-13 NOTE — TELEPHONE ENCOUNTER
She needs to see her PCP so they can do a UA on her and prescribe the appropriate antibiotic. Or urgent care today.

## 2020-03-13 NOTE — TELEPHONE ENCOUNTER
Ana's son requests that nurse return their call. The best time to reach her is Anytime. Pt's son calling wanting to know if pt can get a prescript for an UTI. Please contact pt's son if not able to or when script is sent. Thank you.

## 2020-03-20 ENCOUNTER — TELEPHONE (OUTPATIENT)
Dept: NEUROSURGERY | Age: 75
End: 2020-03-20

## 2020-04-15 ENCOUNTER — TELEPHONE (OUTPATIENT)
Dept: NEUROSURGERY | Age: 75
End: 2020-04-15

## 2020-04-16 ENCOUNTER — TELEMEDICINE (OUTPATIENT)
Dept: NEUROSURGERY | Age: 75
End: 2020-04-16
Payer: MEDICARE

## 2020-04-16 PROCEDURE — 1090F PRES/ABSN URINE INCON ASSESS: CPT | Performed by: NURSE PRACTITIONER

## 2020-04-16 PROCEDURE — 99213 OFFICE O/P EST LOW 20 MIN: CPT | Performed by: NURSE PRACTITIONER

## 2020-04-16 PROCEDURE — G8400 PT W/DXA NO RESULTS DOC: HCPCS | Performed by: NURSE PRACTITIONER

## 2020-04-16 PROCEDURE — G8427 DOCREV CUR MEDS BY ELIG CLIN: HCPCS | Performed by: NURSE PRACTITIONER

## 2020-04-16 PROCEDURE — 4040F PNEUMOC VAC/ADMIN/RCVD: CPT | Performed by: NURSE PRACTITIONER

## 2020-04-16 PROCEDURE — 1123F ACP DISCUSS/DSCN MKR DOCD: CPT | Performed by: NURSE PRACTITIONER

## 2020-04-16 RX ORDER — QUETIAPINE FUMARATE 25 MG/1
25 TABLET, FILM COATED ORAL EVERY EVENING
Qty: 45 TABLET | Refills: 3 | Status: SHIPPED | OUTPATIENT
Start: 2020-04-16 | End: 2020-07-13 | Stop reason: SDUPTHER

## 2020-04-16 RX ORDER — CARBIDOPA AND LEVODOPA 50; 200 MG/1; MG/1
TABLET, EXTENDED RELEASE ORAL
Qty: 90 TABLET | Refills: 11 | Status: SHIPPED | OUTPATIENT
Start: 2020-04-16 | End: 2020-07-23

## 2020-04-16 NOTE — PROGRESS NOTES
[Iodides]     Sulfa Antibiotics     Nickel Rash       PHYSICAL EXAMINATION:    [x] OTHER: patient was alert and oriented, speech was fluent. Per patient family, she is moving all extremities, gait is somewhat shuffling when she first starts walking but then becomes smooth and steady. No overt tremor per family. Due to this being a TeleHealth encounter, evaluation of the following organ systems is limited: Vitals/Constitutional/EENT/Resp/CV/GI//MS/Neuro/Skin/Heme-Lymph-Imm. LABS RECORD AND IMAGING REVIEW (As below and per HPI)    No results found for: Jon Hadley  No results found for: WBC, HGB, HCT, MCV, PLT  No results found for: NA, K, CL, CO2, BUN, CREATININE, GLUCOSE, CALCIUM, PROT, LABALBU, BILITOT, ALKPHOS, AST, ALT, LABGLOM, GFRAA, AGRATIO, GLOB    Reviewed prior records     ASSESSMENT:    Kuldip Dunn is a 66y.o. year old female evaluated via Telehealth encounter for worsening hallucinations and parkinson's. Family has noted worsening hallucinations recently, acting on them at times. She is on Seroquel 12.5mg nightly, will plan to increase this slightly to 25mg nightly. Discussed that this could potentially worsen parkinson's so will need to monitor closely. She has previously failed Nuplazid. She is stable from a PD symptom standpoint with current medications, combination of short acting and long acting Sinemet. She has failed Requip (dopamine agonist) and Xadago (MAO inhibitor) in the past. Off Neurontin at this time for RLS, family felt that this might have worsened hallucinations. Diagnosis Orders   1. Parkinson disease ([facility] [address]     2. Hallucinations        PLAN:  1. Continue Sinemet 25/100mg 1 tablet TID  2. Continue Sinemet CR 50/100 1 tablet TID  3. Increase Seroquel to 25mg nightly. Discussed side effects including black box warning with patient and family   4. Consider adding Neurontin back in if RLS symptoms worsen   5.  Continue to follow memory clinically, adding memory agent felt low yield today. 6. Increase supervision/monitoring at home, fall precautions   7. Follow up in 2 months, sooner with any worsening     An  electronic signature was used to authenticate this note. GARTH Denson       Pursuant to the emergency declaration under the [address] [place] [address], 95 waiver authority and the [employer] Act, this Virtual  Visit was conducted, with patient's consent, to reduce the patient's risk of exposure to COVID-19 and provide continuity of care for an established patient. Services were provided through a video synchronous discussion virtually to substitute for in-person clinic visit.

## 2020-04-16 NOTE — PROGRESS NOTES
REVIEW OF SYSTEMS    Constitutional: []Fever []Sweats []Chills [] Recent Injury [x] Denies all unless marked  HEENT:[]Headache  [] Head Injury/Hearing Loss  [] Sore Throat  [] Ear Ach/Dizziness  [x] Denies all unless marked  Spine:  [] Neck pain  [] Back pain  [] Sciaticia  [x] Denies all unless marked  Psychiatric/Behavioral:[] Depression [] Anxiety [x] Denies all unless marked  Extremities: []Pain  []Swelling  [x] Denies all unless marked  Musculoskeletal: [] Myalgias  [] Joint Pain  [] Arthritis [] Muscle Cramps [] Muscle Twitches  [x] Denies all unless marked  Sleep: []Insomnia[]Snoring []Restless Legs  []Sleep Apnea  []Daytime Sleepiness  [x] Denies all unless marked  Neurological:[]Visual Disturbance/Memory Loss []Loss of Balance []Slurred Speech/Weakness []Seizures  [] Vertigo/Dizziness [x] Denies all unless marked    The MA has completed the ROS with the patient. I have reviewed it in its' entirety with the patient and agree with the documentation.

## 2020-05-20 ENCOUNTER — TELEPHONE (OUTPATIENT)
Dept: NEUROSURGERY | Age: 75
End: 2020-05-20

## 2020-06-19 ENCOUNTER — TELEMEDICINE (OUTPATIENT)
Dept: NEUROSURGERY | Age: 75
End: 2020-06-19
Payer: MEDICARE

## 2020-06-19 PROCEDURE — 99213 OFFICE O/P EST LOW 20 MIN: CPT | Performed by: NURSE PRACTITIONER

## 2020-06-19 PROCEDURE — 1090F PRES/ABSN URINE INCON ASSESS: CPT | Performed by: NURSE PRACTITIONER

## 2020-06-19 PROCEDURE — G8427 DOCREV CUR MEDS BY ELIG CLIN: HCPCS | Performed by: NURSE PRACTITIONER

## 2020-06-19 PROCEDURE — 4040F PNEUMOC VAC/ADMIN/RCVD: CPT | Performed by: NURSE PRACTITIONER

## 2020-06-19 PROCEDURE — 1123F ACP DISCUSS/DSCN MKR DOCD: CPT | Performed by: NURSE PRACTITIONER

## 2020-06-19 PROCEDURE — G8400 PT W/DXA NO RESULTS DOC: HCPCS | Performed by: NURSE PRACTITIONER

## 2020-06-19 NOTE — PROGRESS NOTES
[]?Sweat []? Chills []? Recent Injury [x]? Denies all unless marked  HEENT:[]? Headache  []? Head Injury/Hearing Loss  []? Sore Throat  [x]? Ear Ache/Dizziness  [x]? Denies all unless marked  Spine:  []? Neck pain  []? Back pain  []? Sciaticia  [x]? Denies all unless marked  Cardiovascular:[]? Heart Disease []? Chest Pain []? Palpitations  [x]? Denies all unless marked  Pulmonary: [x]? Shortness of Breath []? Cough   [x]? Denies all unless marke  Gastrointestinal: []? Nausea  []? Vomiting  []? Abdominal Pain  []? Constipation  []? Diarrhea  []? Dark Bloody Stools  [x]? Denies all unless marked  Psychiatric/Behavioral:[x]? Depression [x]? Anxiety [x]? Denies all unless marked  Genitourinary:   []? Frequency  []? Urgency  []? Incontinence []? Pain with Urination  [x]? Denies all unless marked  Extremities: []? Pain  []? Swelling  [x]? Denies all unless marked  Musculoskeletal: [x]? Muscle Pain  [x]? Joint Pain  []? Arthritis [x]? Muscle Cramps [x]? Muscle Twitches  [x]? Denies all unless marked  Sleep: [x]? Insomnia []? Snoring []? Restless Legs []? Sleep Apnea  [x]? Daytime Sleepiness  [x]? Denies all unless marked  Skin:[]? Rash []? Skin Discoloration [x]? Denies all unless marked   Neurological: [x]? Visual Disturbance/Memory Loss []? Loss of Balance []? Slurred Speech/Weakness []? Seizures  []? Vertigo/Dizziness [x]? Denies all unless marked    The MA has completed the ROS with the patient. I have reviewed it in its' entirety with the patient and agree with the documentation. Past Medical History:   Diagnosis Date    Hyperthyroidism     Parkinson disease ([facility] [address]        Past Surgical History:   Procedure Laterality Date    BRAIN SURGERY      CHOLECYSTECTOMY      THYROIDECTOMY, PARTIAL         History reviewed. No pertinent family history.     Social History     Socioeconomic History    Marital status:      Spouse name: Not on file    Number of children: Not on file    Years of education: Not on file    Highest education level: Not on file   Occupational History    Not on file   Social Needs    Financial resource strain: Not on file    Food insecurity     Worry: Not on file     Inability: Not on file    Transportation needs     Medical: Not on file     Non-medical: Not on file   Tobacco Use    Smoking status: Never Smoker    Smokeless tobacco: Never Used   Substance and Sexual Activity    Alcohol use: No    Drug use: No    Sexual activity: Not Currently     Partners: Male   Lifestyle    Physical activity     Days per week: Not on file     Minutes per session: Not on file    Stress: Not on file   Relationships    Social connections     Talks on phone: Not on file     Gets together: Not on file     Attends [employer] service: Not on file     Active member of club or organization: Not on file     Attends meetings of clubs or organizations: Not on file     Relationship status: Not on file    Intimate partner violence     Fear of current or ex partner: Not on file     Emotionally abused: Not on file     Physically abused: Not on file     Forced sexual activity: Not on file   Other Topics Concern    Not on file   Social History Narrative    Not on file       Current Outpatient Medications   Medication Sig Dispense Refill    carbidopa-levodopa (SINEMET)  MG per tablet Take 1 tablet by mouth 4 times daily 180 tablet 11    carbidopa-levodopa (SINEMET CR)  MG per extended release tablet TAKE 1 TABLET BY MOUTH FOUR TIMES DAILY 90 tablet 11    QUEtiapine (SEROQUEL) 25 MG tablet Take 1 tablet by mouth every evening (Patient taking differently: Take 50 mg by mouth every evening ) 45 tablet 3    loratadine (CLARITIN) 10 MG capsule Take 10 mg by mouth daily      ibuprofen (ADVIL;MOTRIN) 200 MG tablet Take 200 mg by mouth every 6 hours as needed for Pain      Multiple Vitamin (MULTI VITAMIN DAILY PO) Take by mouth      Ascorbic Acid (SHANNA-C PO) Take by mouth      Cyanocobalamin (VITAMIN B 12 PO) Take by limited: Vitals/Constitutional/EENT/Resp/CV/GI//MS/Neuro/Skin/Heme-Lymph-Imm. LABS RECORD AND IMAGING REVIEW (As below and per HPI)    No results found for: Lowell Tobias  No results found for: WBC, HGB, HCT, MCV, PLT  No results found for: NA, K, CL, CO2, BUN, CREATININE, GLUCOSE, CALCIUM, PROT, LABALBU, BILITOT, ALKPHOS, AST, ALT, LABGLOM, GFRAA, AGRATIO, GLOB    Reviewed previous records     ASSESSMENT:    Jose Saldana is a 66y.o. year old female evaluated via Telehealth encounter for follow up of Parkinson's and hallucinations. She has quite a bit of dyskinesias on exam today. Currently on Sinemet 25/100 and Sinemet CR 50/200 QID. She has been quite well controlled on this from a symptoms standpoint. Given dyskinesias today will plan to wean Sinemet CR and try to adjust dosing on Sinemet as needed. She has failed Requip (dopamine agonist) and Xadago (MAO inhibitor) previously. Adding DA could potentially worsen hallucinations as well. Continues to note hallucinations, both visual and auditory. Will plan to continue to titrate Seroquel as tolerated, discussed this could potentially worsen parkinsonisms so will need to monitor. Could consider adding Amantidine as well for dyskinesias but want to make medication adjustments slowly to monitor for side effects. Diagnosis Orders   1. Parkinson disease ([facility] [address]     2. Hallucinations          PLAN:  1. Decrease Sinemet CR every 3-4 days until she is off of this  2. Continue Sinemet 25/100 QID with every 4 hour dosing   3. Continue Seroquel 50mg nightly. If hallucinations continue then will plan to increase to 75mg nightly but want to get off Sinemet CR first and ensure that UTI is treated first.   4. Consider adding Neurontin back if RLS symptoms worsen   5. Increase supervision at home, fall precautions   6. Continue to follow memory clinically, add memory agent with any progression.    7. Follow up in 6 weeks, sooner with any worsening       An  electronic

## 2020-06-29 ENCOUNTER — TELEPHONE (OUTPATIENT)
Dept: NEUROSURGERY | Age: 75
End: 2020-06-29

## 2020-06-30 ENCOUNTER — TELEPHONE (OUTPATIENT)
Dept: NEUROSURGERY | Age: 75
End: 2020-06-30

## 2020-06-30 NOTE — TELEPHONE ENCOUNTER
Called patients son per EG to let them know that ER or urgent care to rule out blood clots. Or if its only at night that they could re start the Gabapentin .

## 2020-07-08 ENCOUNTER — TELEPHONE (OUTPATIENT)
Dept: NEUROSURGERY | Age: 75
End: 2020-07-08

## 2020-07-08 NOTE — TELEPHONE ENCOUNTER
Patients son called stated patient is doing really good on the Carbidpa-Levodopa CR taking it q4 hours, wanting to know if they can increase it to 5 times a day.  Please advise

## 2020-07-08 NOTE — TELEPHONE ENCOUNTER
At her last visit we were stopping the Sinemet CR and just doing regular Sinemet. Can you clarify this?

## 2020-07-09 NOTE — TELEPHONE ENCOUNTER
John Cronin good!      They can try to increase it to 5 times a day but make sure they watch for worsening dyskinesias (abnormal, writhing type movements)

## 2020-07-09 NOTE — TELEPHONE ENCOUNTER
Called patients son back stated that they did stop the extended release and only giving her the sinemet quick release , I am sorry that was my mistake.

## 2020-07-10 ENCOUNTER — TELEPHONE (OUTPATIENT)
Dept: NEUROSURGERY | Age: 75
End: 2020-07-10

## 2020-07-10 NOTE — TELEPHONE ENCOUNTER
Called patients son back to let her know that EG stated ok to increase mom's medication to 5 times a day. Asked him to return my call if he had any questions.

## 2020-07-14 NOTE — TELEPHONE ENCOUNTER
Requested Prescriptions     Pending Prescriptions Disp Refills    QUEtiapine (SEROQUEL) 25 MG tablet 45 tablet 3     Sig: Take 2 tablets by mouth every evening       Last Office Visit: 2/26/2020  Next Office Visit: 8/3/2020  Last Medication Refill: 4/16/20   Dose has been increased by provider

## 2020-07-15 RX ORDER — QUETIAPINE FUMARATE 25 MG/1
50 TABLET, FILM COATED ORAL EVERY EVENING
Qty: 45 TABLET | Refills: 3 | Status: SHIPPED | OUTPATIENT
Start: 2020-07-15 | End: 2020-08-03 | Stop reason: SDUPTHER

## 2020-08-03 ENCOUNTER — OFFICE VISIT (OUTPATIENT)
Dept: NEUROSURGERY | Age: 75
End: 2020-08-03
Payer: MEDICARE

## 2020-08-03 VITALS
BODY MASS INDEX: 20.09 KG/M2 | HEART RATE: 73 BPM | SYSTOLIC BLOOD PRESSURE: 108 MMHG | DIASTOLIC BLOOD PRESSURE: 66 MMHG | HEIGHT: 66 IN | WEIGHT: 125 LBS

## 2020-08-03 PROCEDURE — 1123F ACP DISCUSS/DSCN MKR DOCD: CPT | Performed by: NURSE PRACTITIONER

## 2020-08-03 PROCEDURE — G8427 DOCREV CUR MEDS BY ELIG CLIN: HCPCS | Performed by: NURSE PRACTITIONER

## 2020-08-03 PROCEDURE — 4040F PNEUMOC VAC/ADMIN/RCVD: CPT | Performed by: NURSE PRACTITIONER

## 2020-08-03 PROCEDURE — 99213 OFFICE O/P EST LOW 20 MIN: CPT | Performed by: NURSE PRACTITIONER

## 2020-08-03 PROCEDURE — G8420 CALC BMI NORM PARAMETERS: HCPCS | Performed by: NURSE PRACTITIONER

## 2020-08-03 PROCEDURE — G8400 PT W/DXA NO RESULTS DOC: HCPCS | Performed by: NURSE PRACTITIONER

## 2020-08-03 PROCEDURE — 1090F PRES/ABSN URINE INCON ASSESS: CPT | Performed by: NURSE PRACTITIONER

## 2020-08-03 PROCEDURE — 1036F TOBACCO NON-USER: CPT | Performed by: NURSE PRACTITIONER

## 2020-08-03 RX ORDER — QUETIAPINE FUMARATE 25 MG/1
75 TABLET, FILM COATED ORAL EVERY EVENING
Qty: 90 TABLET | Refills: 5 | Status: SHIPPED | OUTPATIENT
Start: 2020-08-03 | End: 2020-12-07 | Stop reason: SDUPTHER

## 2020-08-03 NOTE — PROGRESS NOTES
[id number] [facility] Neurology Office Note      Patient:   Isma Martinez  MR#:    [id number]  Account Number:                         YOB: 1945  Date of Evaluation:  8/3/2020  Time of Note:                          12:23 PM  Primary/Referring Physician:  Mere Doss MD   Consulting Physician:  GARTH Hernandes     FOLLOW UP    Chief Complaint   Patient presents with    Follow-up     pt states things are better       HISTORY OF PRESENT ILLNESS    Isma Martinez is a 66y.o. year old female here follow up of Parkinson's. On Sinemet 25/100 five times daily, off Sinemet CR now. Family feels like she has improved since stopping Sinemet CR. No dyskinesias noted today. Noting some tremor in the morning that improves with Sinemet dose. Gait is stable. No clear rigidity on exam today. Still noting hallucinations but improved- both auditory and visual. She is on Seroquel, no side effects. She previously tried Nuplazid but no improvement. She was diagnosed with PD in 2000 at [facility], followed by Dr. Reza Fuentes before as well. Has tried and failed Requip and Gillie Langford. Memory is about the same. Not interfering with ADLs at this time. She doesn't drive related to hallucinations. She lives on her own, sons frequently check on her. She was on Neurontin for RLS but family felt this made hallucinations worse. Symptoms unchanged. No other complaints. Past Medical History:   Diagnosis Date    Hyperthyroidism     Parkinson disease ([address]        Past Surgical History:   Procedure Laterality Date    BRAIN SURGERY      CHOLECYSTECTOMY      THYROIDECTOMY, PARTIAL         History reviewed. No pertinent family history.     Social History     Socioeconomic History    Marital status:      Spouse name: Not on file    Number of children: Not on file    Years of education: Not on file    Highest education level: Not on file   Occupational History    Not on file   Social Needs    Financial resource strain: Not on file    Food insecurity     Worry: Not on file     Inability: Not on file    Transportation needs     Medical: Not on file     Non-medical: Not on file   Tobacco Use    Smoking status: Never Smoker    Smokeless tobacco: Never Used   Substance and Sexual Activity    Alcohol use: No    Drug use: No    Sexual activity: Not Currently     Partners: Male   Lifestyle    Physical activity     Days per week: Not on file     Minutes per session: Not on file    Stress: Not on file   Relationships    Social connections     Talks on phone: Not on file     Gets together: Not on file     Attends Ep[employer] service: Not on file     Active member of club or organization: Not on file     Attends meetings of clubs or organizations: Not on file     Relationship status: Not on file    Intimate partner violence     Fear of current or ex partner: Not on file     Emotionally abused: Not on file     Physically abused: Not on file     Forced sexual activity: Not on file   Other Topics Concern    Not on file   Social History Narrative    Not on file       Current Outpatient Medications   Medication Sig Dispense Refill    carbidopa-levodopa (SINEMET)  MG per tablet Take 1 tablet by mouth 5 times daily 150 tablet 5    QUEtiapine (SEROQUEL) 25 MG tablet Take 3 tablets by mouth every evening 90 tablet 5    loratadine (CLARITIN) 10 MG capsule Take 10 mg by mouth daily      ibuprofen (ADVIL;MOTRIN) 200 MG tablet Take 200 mg by mouth every 6 hours as needed for Pain      Multiple Vitamin (MULTI VITAMIN DAILY PO) Take by mouth      Ascorbic Acid (SHANNA-C PO) Take by mouth      Cyanocobalamin (VITAMIN B 12 PO) Take by mouth      Magnesium 100 MG CAPS Take by mouth Not sure of the mg      calcium carbonate (OSCAL) 500 MG TABS tablet Take 500 mg by mouth daily Not sure of the mg      levothyroxine (SYNTHROID) 75 MCG tablet Take 75 mcg by mouth daily        No current facility-administered medications for this visit. ears  Hearing normal bilaterally to finger rub  Cardiovascular -   No clubbing, cyanosis, or edema   Pulmonary-   Good expansion, normal effort without use of accessory muscles  Musculoskeletal -   No significant wasting of muscles noted  Gait as below, see gait exam in the neurologic exam  Muscle strength, tone, stability as below. No bony deformities  Skin -   Warm, dry, and intact to inspection and palpation. No rash, erythema, or pallor  Psychiatric -   Mood, affect, and behavior appear normal    Memory as below see mental status examination in the neurologic exam    NEUROLOGICAL EXAM    Mental status   [x]Awake, alert, oriented   [x]Affect attention and concentration appear appropriate  [x]Recent and remote memory appears unremarkable  [x]Speech normal without dysarthria or aphasia, comprehension and repetition intact. COMMENTS:    Cranial Nerves [x]No VF deficit to confrontation,  no papilledema on fundoscopic exam.  [x]PERRLA, EOMI, no nystagmus, conjugate eye movements, no ptosis  [x]Face symmetric  [x]Facial sensation intact  [x]Tongue midline no atrophy or fasciculations present  [x]Palate midline, hearing to finger rub normal bilaterally  [x]Shoulder shrug and SCM testing normal bilaterally  COMMENTS:   Motor   []5/5 strength x 4 extremities  []Normal bulk and tone  []No tremor present  []No rigidity or bradykinesia noted  COMMENTS: no obvious tremor, no dyskinesias noted, bradykinesia noted    Sensory  []Sensation intact to light touch, pin prick, vibration, and proprioception BLE  []Sensation intact to light touch, pin prick, vibration, and proprioception BUE  COMMENTS: Decreased LT, PP, Vib BLE   Coordination [x]FTN normal bilaterally   []HTS normal bilaterally  []TAL normal bilaterally.    COMMENTS:   Reflexes  [x]Symmetric and non-pathological  [x]Toes down going bilaterally  [x]No clonus present  COMMENTS:   Gait                  []Normal steady gait    []Ataxic    []Spastic     []Magnetic [x]Shuffling- improved  COMMENTS:       LABS RECORD AND IMAGING REVIEW (As below and per HPI)  Reviewed prior records     ASSESSMENT:    Dayne Gibson is a 66y.o. year old female here for follow up of Parkinson's disease. Previously followed at [facility] and by Dr. Eldon Sever, diagnosed about 20 years ago. She was having quite a few dyskinesias on last exam so weaned and discontinued Sinemet CR. Has noted improvement since that, no dyskinesia, bradykinesia or rigidity noted today. Currently on Sinemet 25/100 five times daily. She is not been able to tolerate dopamine agonists in the past (requip). She is also tried MAO inhibitors as well (xadago). Continues to note hallucinations although improved. She has tried and failed Nuplazid, on Seroquel and this is beneficial, no worsening parkinsonisms noted with this. Diagnosis Orders   1. Parkinson disease ([facility] [address]     2. Hallucinations          PLAN:    1. Continue Sinemet five times daily. Discussed side effects. 2. Continue Seroquel 75mg nightly. Discussed side effects, monitor for worsening parkinsonisms. 3. Consider adding Neurontin back if RLS symptoms worsen, stable today   4. Increase supervision at home, fall precautions   5. Continue to follow memory clinically, add memory agent with any progression. 6. Follow up in 4 months, sooner with any worsening      Giacomo Mayers DNP, APRN    Note:  A total of >50% (>8 minutes) of 15 minutes was spent discussing the pathophysiology and treatment and/or coordination of care of the above diagnoses.

## 2020-12-07 ENCOUNTER — OFFICE VISIT (OUTPATIENT)
Dept: NEUROSURGERY | Age: 75
End: 2020-12-07
Payer: MEDICARE

## 2020-12-07 VITALS
OXYGEN SATURATION: 100 % | HEIGHT: 66 IN | TEMPERATURE: 98 F | BODY MASS INDEX: 20.09 KG/M2 | DIASTOLIC BLOOD PRESSURE: 62 MMHG | WEIGHT: 125 LBS | SYSTOLIC BLOOD PRESSURE: 123 MMHG | HEART RATE: 68 BPM

## 2020-12-07 PROCEDURE — 1090F PRES/ABSN URINE INCON ASSESS: CPT | Performed by: NURSE PRACTITIONER

## 2020-12-07 PROCEDURE — G8400 PT W/DXA NO RESULTS DOC: HCPCS | Performed by: NURSE PRACTITIONER

## 2020-12-07 PROCEDURE — 1036F TOBACCO NON-USER: CPT | Performed by: NURSE PRACTITIONER

## 2020-12-07 PROCEDURE — 4040F PNEUMOC VAC/ADMIN/RCVD: CPT | Performed by: NURSE PRACTITIONER

## 2020-12-07 PROCEDURE — G8484 FLU IMMUNIZE NO ADMIN: HCPCS | Performed by: NURSE PRACTITIONER

## 2020-12-07 PROCEDURE — 99213 OFFICE O/P EST LOW 20 MIN: CPT | Performed by: NURSE PRACTITIONER

## 2020-12-07 PROCEDURE — G8420 CALC BMI NORM PARAMETERS: HCPCS | Performed by: NURSE PRACTITIONER

## 2020-12-07 PROCEDURE — 1123F ACP DISCUSS/DSCN MKR DOCD: CPT | Performed by: NURSE PRACTITIONER

## 2020-12-07 PROCEDURE — G8427 DOCREV CUR MEDS BY ELIG CLIN: HCPCS | Performed by: NURSE PRACTITIONER

## 2020-12-07 RX ORDER — QUETIAPINE FUMARATE 25 MG/1
100 TABLET, FILM COATED ORAL EVERY EVENING
Qty: 90 TABLET | Refills: 5 | Status: SHIPPED | OUTPATIENT
Start: 2020-12-07 | End: 2021-01-13 | Stop reason: SDUPTHER

## 2020-12-07 NOTE — PROGRESS NOTES

## 2020-12-07 NOTE — PROGRESS NOTES
[id number] [facility] Neurology Office Note      Patient:   Boris Ricks  MR#:    [id number]  Account Number:                         YOB: 1945  Date of Evaluation:  12/7/2020  Time of Note:                          12:01 PM  Primary/Referring Physician:  Nick Ulloa MD   Consulting Physician:  GARTH Mendoza     FOLLOW UP    Chief Complaint   Patient presents with    Follow-up    Tremors     c/o left leg \"hard to move feeling like it weighs a ton\"    Medication Adjustment     wants to increase seroquel to 4 pills at night       [facility]    Boris Ricks is a 66y.o. year old female here follow up of Parkinson's. Overall doing about the same since last visit. Noting some tremor in the morning that improves with Sinemet dose. Gait is stable. No clear rigidity on exam today. No dyskinesias noted. On Sinemet 1 tablet five times daily. Did not tolerate Sinemet CR previously. Continues to note hallucinations, typically occur at night. Both auditory and visual hallucinations noted. She is on Seroquel, no side effects. Has tried Nuplazid previously, did not help. Memory loss is about the same. Primarily short term in nature. Does not interfere with ADLs. Using a [employer] pill organizer for her medications. She does not drive. Her sons are involved in her care, check on her daily. She was on Neurontin for RLS but family felt this made hallucinations worse. RLS symptoms unchanged. She was diagnosed with PD in 2000 at [facility], followed by Dr. Lisandro Stacy before as well. Has tried and failed Requip and Ayan Copas. No other complaints. Past Medical History:   Diagnosis Date    Hyperthyroidism     Parkinson disease ([address]        Past Surgical History:   Procedure Laterality Date    BRAIN SURGERY      CHOLECYSTECTOMY      THYROIDECTOMY, PARTIAL         History reviewed. No pertinent family history. Social History     Socioeconomic History    Marital status:       Spouse name: Not on file    Number of children: Not on file    Years of education: Not on file    Highest education level: Not on file   Occupational History    Not on file   Social Needs    Financial resource strain: Not on file    Food insecurity     Worry: Not on file     Inability: Not on file    Transportation needs     Medical: Not on file     Non-medical: Not on file   Tobacco Use    Smoking status: Never Smoker    Smokeless tobacco: Never Used   Substance and Sexual Activity    Alcohol use: No    Drug use: No    Sexual activity: Not Currently     Partners: Male   Lifestyle    Physical activity     Days per week: Not on file     Minutes per session: Not on file    Stress: Not on file   Relationships    Social connections     Talks on phone: Not on file     Gets together: Not on file     Attends Cheondo[employer] service: Not on file     Active member of club or organization: Not on file     Attends meetings of clubs or organizations: Not on file     Relationship status: Not on file    Intimate partner violence     Fear of current or ex partner: Not on file     Emotionally abused: Not on file     Physically abused: Not on file     Forced sexual activity: Not on file   Other Topics Concern    Not on file   Social History Narrative    Not on file       Current Outpatient Medications   Medication Sig Dispense Refill    carbidopa-levodopa (SINEMET)  MG per tablet Take 1 tablet by mouth 5 times daily 150 tablet 5    QUEtiapine (SEROQUEL) 25 MG tablet Take 4 tablets by mouth every evening 90 tablet 5    loratadine (CLARITIN) 10 MG capsule Take 10 mg by mouth daily      Multiple Vitamin (MULTI VITAMIN DAILY PO) Take by mouth      Ascorbic Acid (SHANNA-C PO) Take by mouth      Cyanocobalamin (VITAMIN B 12 PO) Take by mouth      Magnesium 100 MG CAPS Take by mouth Not sure of the mg      calcium carbonate (OSCAL) 500 MG TABS tablet Take 500 mg by mouth daily Not sure of the mg      levothyroxine (SYNTHROID) 75 MCG tablet Take 75 mcg by mouth daily        No current facility-administered medications for this visit. Allergies   Allergen Reactions    Contrast [Iodides]     Sulfa Antibiotics     Nickel Rash     REVIEW OF SYSTEMS  Constitutional: []? Fever []? Sweats []? Chills []? Recent Injury [x]? Denies all unless marked  HEENT:[]? Headache  []? Head Injury []? Hearing Loss  []? Sore Throat  []? Ear Ache [x]? Denies all unless marked  Spine:  []? Neck pain  []? Back pain  []? Sciaticia  [x]? Denies all unless marked  Cardiovascular:[]? Heart Disease []? Palpitations []? Chest Pain   [x]? Denies all unless marked  Pulmonary: []? Shortness of Breath []? Cough   [x]? Denies all unless marked  Psychiatric/Behavioral:[]? Depression []? Anxiety [x]? Denies all unless marked  Gastrointestinal: []? Nausea  []? Vomiting  []? Abdominal Pain  []? Constipation  []? Diarrhea  [x]? Denies all unless marked  Genitourinary:   []? Frequency  []? Urgency  []? Dysuria []? Incontinence  [x]? Denies all unless marked  Extremities: []? Pain  []? Swelling  [x]? Denies all unless marked  Musculoskeletal: []? Myalgias  []? Joint Pain  []? Arthritis []? Muscle Cramps []? Muscle Twitches  [x]? Denies all unless marked  Sleep: []? Insomnia[]? Snoring []? Restless Legs  []? Sleep Apnea  []? Daytime Sleepiness  [x]? Denies all unless marked  Skin:[]? Rash []? Color Change [x]? Denies all unless marked   Neurological:[]? Visual Disturbance []? Memory Loss []? Loss of Balance []? Slurred Speech []? Weakness []? Seizures  []? Dizziness [x]? Denies all unless marked    The MA has completed the ROS with the patient. I have reviewed it in its' entirety with the patient and agree with the documentation.       PHYSICAL EXAM    Constitutional -   /62   Pulse 68   Temp 98 °F (36.7 °C)   Ht 5' 6\" (1.676 m)   Wt 125 lb (56.7 kg)   SpO2 100%   BMI 20.18 kg/m²    General appearance: No acute distress   EYES -   Conjunctiva normal  Pupillary exam as below, see CN exam

## 2021-01-13 ENCOUNTER — TELEPHONE (OUTPATIENT)
Dept: NEUROSURGERY | Age: 76
End: 2021-01-13

## 2021-01-13 RX ORDER — QUETIAPINE FUMARATE 100 MG/1
100 TABLET, FILM COATED ORAL EVERY EVENING
Qty: 90 TABLET | Refills: 1 | Status: SHIPPED | OUTPATIENT
Start: 2021-01-13 | End: 2021-07-08

## 2021-01-19 ENCOUNTER — TELEPHONE (OUTPATIENT)
Dept: NEUROSURGERY | Age: 76
End: 2021-01-19

## 2021-01-19 NOTE — TELEPHONE ENCOUNTER
Patients son called wanted to know if his mom would be okay to take Covid Vacc.  With her parkinson's or would it make her worse please advise

## 2021-03-04 ENCOUNTER — TELEPHONE (OUTPATIENT)
Dept: NEUROSURGERY | Age: 76
End: 2021-03-04

## 2021-04-14 ENCOUNTER — OFFICE VISIT (OUTPATIENT)
Dept: NEUROSURGERY | Age: 76
End: 2021-04-14
Payer: MEDICARE

## 2021-04-14 VITALS
HEART RATE: 69 BPM | DIASTOLIC BLOOD PRESSURE: 61 MMHG | WEIGHT: 125 LBS | BODY MASS INDEX: 20.09 KG/M2 | SYSTOLIC BLOOD PRESSURE: 132 MMHG | OXYGEN SATURATION: 98 % | HEIGHT: 66 IN | TEMPERATURE: 98.2 F

## 2021-04-14 DIAGNOSIS — R44.3 HALLUCINATIONS: ICD-10-CM

## 2021-04-14 DIAGNOSIS — G20 PARKINSON DISEASE (HCC): Primary | ICD-10-CM

## 2021-04-14 PROCEDURE — 99213 OFFICE O/P EST LOW 20 MIN: CPT | Performed by: NURSE PRACTITIONER

## 2021-04-14 RX ORDER — QUETIAPINE FUMARATE 25 MG/1
TABLET, FILM COATED ORAL
Qty: 60 TABLET | Refills: 2 | Status: SHIPPED | OUTPATIENT
Start: 2021-04-14 | End: 2021-10-11

## 2021-04-14 RX ORDER — VIT C/B6/B5/MAGNESIUM/HERB 173 50-5-6-5MG
1000 CAPSULE ORAL 2 TIMES DAILY
COMMUNITY
End: 2022-06-22

## 2021-04-14 NOTE — PROGRESS NOTES
[facility] Neurology Office Note      Patient:   Aliya Cardenas  MR#:    [id number]  Account Number:                         YOB: 1945  Date of Evaluation:  4/14/2021  Time of Note:                          11:59 AM  Primary/Referring Physician:  Millie Martin MD   Consulting Physician:  Sadie Hanson DNP, APRN     FOLLOW UP    Chief Complaint   Patient presents with    Follow-up     pt states things are good able to walk without walker now   Coby Almonte is a 64y.o. year old female here follow up of Parkinson's, memory loss. She is accompanied by her son today. Doing about the same from last visit. Tremor will wax and wane. No gait changes. Intermittent bradykinesia noted. Denies falls. No dyskinesias. Still noting hallucinations, both auditory and visual hallucinations. On Seroquel 100mg nightly, no side effects. Failed Nuplazid previously. Memory loss is about the same. Primarily short term in nature. Does not interfere with ADLs. Using a [employer] pill organizer for her medications. She does not drive. Her sons are involved in her care, check on her daily. She was on Neurontin for RLS but family felt this made hallucinations worse. RLS symptoms unchanged. She was diagnosed with PD in 2000 at [facility], followed by Dr. Mosher Staff before as well. Has tried and failed Requip, Sinemet CR and Xadago. Past Medical History:   Diagnosis Date    Hyperthyroidism     Parkinson disease ([facility] [address]        Past Surgical History:   Procedure Laterality Date    BRAIN SURGERY      CHOLECYSTECTOMY      THYROIDECTOMY, PARTIAL         History reviewed. No pertinent family history. Social History     Socioeconomic History    Marital status:       Spouse name: Not on file    Number of children: Not on file    Years of education: Not on file    Highest education level: Not on file   Occupational History    Not on file   Social Needs    Financial resource daily        No current facility-administered medications for this visit. Allergies   Allergen Reactions    Contrast [Iodides]     Sulfa Antibiotics     Nickel Rash     REVIEW OF SYSTEMS  Constitutional: []? Fever []? Sweats []? Chills []? Recent Injury [x]? Denies all unless marked  HEENT:[]? Headache  []? Head Injury []? Hearing Loss  []? Sore Throat  []? Ear Ache [x]? Denies all unless marked  Spine:  []? Neck pain  []? Back pain  []? Sciaticia  [x]? Denies all unless marked  Cardiovascular:[]? Heart Disease []? Palpitations []? Chest Pain   [x]? Denies all unless marked  Pulmonary: []? Shortness of Breath []? Cough   [x]? Denies all unless marked  Psychiatric/Behavioral:[]? Depression []? Anxiety [x]? Denies all unless marked  Gastrointestinal: []? Nausea  []? Vomiting  []? Abdominal Pain  []? Constipation  []? Diarrhea  [x]? Denies all unless marked  Genitourinary:   []? Frequency  []? Urgency  []? Dysuria []? Incontinence  [x]? Denies all unless marked  Extremities: []? Pain  []? Swelling  [x]? Denies all unless marked  Musculoskeletal: []? Myalgias  []? Joint Pain  []? Arthritis []? Muscle Cramps []? Muscle Twitches  [x]? Denies all unless marked  Sleep: []? Insomnia[]? Snoring []? Restless Legs  []? Sleep Apnea  []? Daytime Sleepiness  [x]? Denies all unless marked  Skin:[]? Rash []? Color Change [x]? Denies all unless marked   Neurological:[]? Visual Disturbance []? Memory Loss []? Loss of Balance []? Slurred Speech []? Weakness []? Seizures  []? Dizziness [x]? Denies all unless marked    The MA has completed the ROS with the patient. I have reviewed it in its' entirety with the patient and agree with the documentation.       PHYSICAL EXAM    Constitutional -   /61   Pulse 69   Temp 98.2 °F (36.8 °C)   Ht 5' 6\" (1.676 m)   Wt 125 lb (56.7 kg)   SpO2 98%   BMI 20.18 kg/m²    General appearance: No acute distress   EYES -   Conjunctiva normal  Pupillary exam as below, see CN exam in the neurologic exam  ENT- No scars, masses, or lesions over external nose or ears  Hearing normal bilaterally to finger rub  Cardiovascular -   No clubbing, cyanosis, or edema   Pulmonary-   Good expansion, normal effort without use of accessory muscles  Musculoskeletal -   No significant wasting of muscles noted  Gait as below, see gait exam in the neurologic exam  Muscle strength, tone, stability as below. No bony deformities  Skin -   Warm, dry, and intact to inspection and palpation. No rash, erythema, or pallor  Psychiatric -   Mood, affect, and behavior appear normal    Memory as below see mental status examination in the neurologic exam    NEUROLOGICAL EXAM    Mental status   [x]Awake, alert, oriented   [x]Affect attention and concentration appear appropriate  [x]Recent and remote memory appears unremarkable  [x]Speech normal without dysarthria or aphasia, comprehension and repetition intact. COMMENTS:    Cranial Nerves [x]No VF deficit to confrontation,  no papilledema on fundoscopic exam.  [x]PERRLA, EOMI, no nystagmus, conjugate eye movements, no ptosis  [x]Face symmetric  [x]Facial sensation intact  [x]Tongue midline no atrophy or fasciculations present  [x]Palate midline, hearing to finger rub normal bilaterally  [x]Shoulder shrug and SCM testing normal bilaterally  COMMENTS:   Motor   []5/5 strength x 4 extremities  []Normal bulk and tone  []No tremor present  []No rigidity or bradykinesia noted  COMMENTS: mild resting tremor bilaterally, no dyskinesias noted, mild bradykinesia, no rigidity. Sensory  []Sensation intact to light touch, pin prick, vibration, and proprioception BLE  []Sensation intact to light touch, pin prick, vibration, and proprioception BUE  COMMENTS: Decreased LT, PP, Vib BLE   Coordination [x]FTN normal bilaterally   []HTS normal bilaterally  []TAL normal bilaterally.    COMMENTS:   Reflexes  [x]Symmetric and non-pathological  [x]Toes down going bilaterally  [x]No clonus present  COMMENTS:   Gait

## 2021-04-26 ENCOUNTER — APPOINTMENT (OUTPATIENT)
Dept: CT IMAGING | Facility: HOSPITAL | Age: 76
End: 2021-04-26

## 2021-04-26 ENCOUNTER — HOSPITAL ENCOUNTER (OUTPATIENT)
Facility: HOSPITAL | Age: 76
Setting detail: OBSERVATION
Discharge: NURSING, REHAB OR LONG-TERM CARE | End: 2021-04-29
Attending: INTERNAL MEDICINE | Admitting: INTERNAL MEDICINE

## 2021-04-26 DIAGNOSIS — S32.592A CLOSED FRACTURE OF MULTIPLE RAMI OF LEFT PUBIS, INITIAL ENCOUNTER (HCC): Primary | ICD-10-CM

## 2021-04-26 DIAGNOSIS — R31.9 URINARY TRACT INFECTION WITH HEMATURIA, SITE UNSPECIFIED: ICD-10-CM

## 2021-04-26 DIAGNOSIS — S32.009A CLOSED FRACTURE OF TRANSVERSE PROCESS OF LUMBAR VERTEBRA, INITIAL ENCOUNTER (HCC): ICD-10-CM

## 2021-04-26 DIAGNOSIS — Z78.9 DECREASED ACTIVITIES OF DAILY LIVING (ADL): ICD-10-CM

## 2021-04-26 DIAGNOSIS — S32.10XA CLOSED FRACTURE OF SACRUM, UNSPECIFIED PORTION OF SACRUM, INITIAL ENCOUNTER (HCC): ICD-10-CM

## 2021-04-26 DIAGNOSIS — Z74.09 IMPAIRED MOBILITY: ICD-10-CM

## 2021-04-26 DIAGNOSIS — N39.0 URINARY TRACT INFECTION WITH HEMATURIA, SITE UNSPECIFIED: ICD-10-CM

## 2021-04-26 PROBLEM — A49.9 UTI (URINARY TRACT INFECTION), BACTERIAL: Status: ACTIVE | Noted: 2021-04-26

## 2021-04-26 PROBLEM — D64.9 ANEMIA: Status: ACTIVE | Noted: 2021-04-26

## 2021-04-26 PROBLEM — S32.599A CLOSED FRACTURE OF MULTIPLE PUBIC RAMI (HCC): Status: ACTIVE | Noted: 2021-04-26

## 2021-04-26 PROBLEM — G20 PARKINSON'S DISEASE (HCC): Chronic | Status: ACTIVE | Noted: 2021-04-26

## 2021-04-26 LAB
ALBUMIN SERPL-MCNC: 3.5 G/DL (ref 3.5–5.2)
ALBUMIN/GLOB SERPL: 1.1 G/DL
ALP SERPL-CCNC: 144 U/L (ref 39–117)
ALT SERPL W P-5'-P-CCNC: <5 U/L (ref 1–33)
ANION GAP SERPL CALCULATED.3IONS-SCNC: 11 MMOL/L (ref 5–15)
AST SERPL-CCNC: 27 U/L (ref 1–32)
BACTERIA UR QL AUTO: ABNORMAL /HPF
BASOPHILS # BLD AUTO: 0.03 10*3/MM3 (ref 0–0.2)
BASOPHILS NFR BLD AUTO: 0.2 % (ref 0–1.5)
BILIRUB SERPL-MCNC: 0.8 MG/DL (ref 0–1.2)
BILIRUB UR QL STRIP: NEGATIVE
BUN SERPL-MCNC: 18 MG/DL (ref 8–23)
BUN/CREAT SERPL: 22.5 (ref 7–25)
CALCIUM SPEC-SCNC: 8.7 MG/DL (ref 8.6–10.5)
CHLORIDE SERPL-SCNC: 105 MMOL/L (ref 98–107)
CLARITY UR: ABNORMAL
CO2 SERPL-SCNC: 24 MMOL/L (ref 22–29)
COLOR UR: YELLOW
CREAT SERPL-MCNC: 0.8 MG/DL (ref 0.57–1)
D-LACTATE SERPL-SCNC: 1 MMOL/L (ref 0.5–2)
DEPRECATED RDW RBC AUTO: 46.5 FL (ref 37–54)
EOSINOPHIL # BLD AUTO: 0.05 10*3/MM3 (ref 0–0.4)
EOSINOPHIL NFR BLD AUTO: 0.4 % (ref 0.3–6.2)
ERYTHROCYTE [DISTWIDTH] IN BLOOD BY AUTOMATED COUNT: 14 % (ref 12.3–15.4)
FERRITIN SERPL-MCNC: 218.9 NG/ML (ref 13–150)
GFR SERPL CREATININE-BSD FRML MDRD: 69 ML/MIN/1.73
GLOBULIN UR ELPH-MCNC: 3.1 GM/DL
GLUCOSE SERPL-MCNC: 117 MG/DL (ref 65–99)
GLUCOSE UR STRIP-MCNC: NEGATIVE MG/DL
HCT VFR BLD AUTO: 32.8 % (ref 34–46.6)
HGB BLD-MCNC: 10.8 G/DL (ref 12–15.9)
HGB UR QL STRIP.AUTO: ABNORMAL
HYALINE CASTS UR QL AUTO: ABNORMAL /LPF
IMM GRANULOCYTES # BLD AUTO: 0.1 10*3/MM3 (ref 0–0.05)
IMM GRANULOCYTES NFR BLD AUTO: 0.8 % (ref 0–0.5)
IRON 24H UR-MRATE: 17 MCG/DL (ref 37–145)
IRON SATN MFR SERPL: 5 % (ref 20–50)
KETONES UR QL STRIP: ABNORMAL
LEUKOCYTE ESTERASE UR QL STRIP.AUTO: ABNORMAL
LIPASE SERPL-CCNC: 57 U/L (ref 13–60)
LYMPHOCYTES # BLD AUTO: 1.41 10*3/MM3 (ref 0.7–3.1)
LYMPHOCYTES NFR BLD AUTO: 10.7 % (ref 19.6–45.3)
MAGNESIUM SERPL-MCNC: 1.9 MG/DL (ref 1.6–2.4)
MCH RBC QN AUTO: 30 PG (ref 26.6–33)
MCHC RBC AUTO-ENTMCNC: 32.9 G/DL (ref 31.5–35.7)
MCV RBC AUTO: 91.1 FL (ref 79–97)
MONOCYTES # BLD AUTO: 1.64 10*3/MM3 (ref 0.1–0.9)
MONOCYTES NFR BLD AUTO: 12.5 % (ref 5–12)
NEUTROPHILS NFR BLD AUTO: 75.4 % (ref 42.7–76)
NEUTROPHILS NFR BLD AUTO: 9.94 10*3/MM3 (ref 1.7–7)
NITRITE UR QL STRIP: POSITIVE
NRBC BLD AUTO-RTO: 0 /100 WBC (ref 0–0.2)
PH UR STRIP.AUTO: 6.5 [PH] (ref 5–8)
PLATELET # BLD AUTO: 236 10*3/MM3 (ref 140–450)
PMV BLD AUTO: 9.5 FL (ref 6–12)
POTASSIUM SERPL-SCNC: 3.6 MMOL/L (ref 3.5–5.2)
PROT SERPL-MCNC: 6.6 G/DL (ref 6–8.5)
PROT UR QL STRIP: ABNORMAL
RBC # BLD AUTO: 3.6 10*6/MM3 (ref 3.77–5.28)
RBC # UR: ABNORMAL /HPF
REF LAB TEST METHOD: ABNORMAL
RETICS # AUTO: 0.08 10*6/MM3 (ref 0.02–0.13)
RETICS/RBC NFR AUTO: 2.25 % (ref 0.7–1.9)
SARS-COV-2 RDRP RESP QL NAA+PROBE: NORMAL
SODIUM SERPL-SCNC: 140 MMOL/L (ref 136–145)
SP GR UR STRIP: 1.02 (ref 1–1.03)
SQUAMOUS #/AREA URNS HPF: ABNORMAL /HPF
T4 FREE SERPL-MCNC: 1.45 NG/DL (ref 0.93–1.7)
TIBC SERPL-MCNC: 344 MCG/DL (ref 298–536)
TRANSFERRIN SERPL-MCNC: 231 MG/DL (ref 200–360)
TROPONIN T SERPL-MCNC: <0.01 NG/ML (ref 0–0.03)
TSH SERPL DL<=0.05 MIU/L-ACNC: 2.63 UIU/ML (ref 0.27–4.2)
UROBILINOGEN UR QL STRIP: ABNORMAL
WBC # BLD AUTO: 13.17 10*3/MM3 (ref 3.4–10.8)
WBC UR QL AUTO: ABNORMAL /HPF

## 2021-04-26 PROCEDURE — 81001 URINALYSIS AUTO W/SCOPE: CPT | Performed by: NURSE PRACTITIONER

## 2021-04-26 PROCEDURE — 96365 THER/PROPH/DIAG IV INF INIT: CPT

## 2021-04-26 PROCEDURE — G0378 HOSPITAL OBSERVATION PER HR: HCPCS

## 2021-04-26 PROCEDURE — 99285 EMERGENCY DEPT VISIT HI MDM: CPT

## 2021-04-26 PROCEDURE — 84466 ASSAY OF TRANSFERRIN: CPT | Performed by: INTERNAL MEDICINE

## 2021-04-26 PROCEDURE — 74176 CT ABD & PELVIS W/O CONTRAST: CPT

## 2021-04-26 PROCEDURE — 83540 ASSAY OF IRON: CPT | Performed by: INTERNAL MEDICINE

## 2021-04-26 PROCEDURE — 83690 ASSAY OF LIPASE: CPT | Performed by: NURSE PRACTITIONER

## 2021-04-26 PROCEDURE — 83605 ASSAY OF LACTIC ACID: CPT | Performed by: NURSE PRACTITIONER

## 2021-04-26 PROCEDURE — 25010000002 CEFTRIAXONE PER 250 MG: Performed by: NURSE PRACTITIONER

## 2021-04-26 PROCEDURE — 96361 HYDRATE IV INFUSION ADD-ON: CPT

## 2021-04-26 PROCEDURE — 83735 ASSAY OF MAGNESIUM: CPT | Performed by: INTERNAL MEDICINE

## 2021-04-26 PROCEDURE — 82728 ASSAY OF FERRITIN: CPT | Performed by: INTERNAL MEDICINE

## 2021-04-26 PROCEDURE — 84443 ASSAY THYROID STIM HORMONE: CPT | Performed by: INTERNAL MEDICINE

## 2021-04-26 PROCEDURE — 85025 COMPLETE CBC W/AUTO DIFF WBC: CPT | Performed by: NURSE PRACTITIONER

## 2021-04-26 PROCEDURE — C9803 HOPD COVID-19 SPEC COLLECT: HCPCS

## 2021-04-26 PROCEDURE — 72131 CT LUMBAR SPINE W/O DYE: CPT

## 2021-04-26 PROCEDURE — 70450 CT HEAD/BRAIN W/O DYE: CPT

## 2021-04-26 PROCEDURE — 80053 COMPREHEN METABOLIC PANEL: CPT | Performed by: NURSE PRACTITIONER

## 2021-04-26 PROCEDURE — 84439 ASSAY OF FREE THYROXINE: CPT | Performed by: INTERNAL MEDICINE

## 2021-04-26 PROCEDURE — 84484 ASSAY OF TROPONIN QUANT: CPT | Performed by: NURSE PRACTITIONER

## 2021-04-26 PROCEDURE — 85045 AUTOMATED RETICULOCYTE COUNT: CPT | Performed by: INTERNAL MEDICINE

## 2021-04-26 PROCEDURE — 87635 SARS-COV-2 COVID-19 AMP PRB: CPT | Performed by: NURSE PRACTITIONER

## 2021-04-26 PROCEDURE — 72125 CT NECK SPINE W/O DYE: CPT

## 2021-04-26 RX ORDER — SODIUM CHLORIDE 0.9 % (FLUSH) 0.9 %
10 SYRINGE (ML) INJECTION AS NEEDED
Status: DISCONTINUED | OUTPATIENT
Start: 2021-04-26 | End: 2021-04-29 | Stop reason: HOSPADM

## 2021-04-26 RX ORDER — QUETIAPINE FUMARATE 25 MG/1
25 TABLET, FILM COATED ORAL DAILY
Status: ON HOLD | COMMUNITY
End: 2021-04-27

## 2021-04-26 RX ORDER — ACETAMINOPHEN 160 MG/5ML
650 SOLUTION ORAL EVERY 4 HOURS PRN
Status: DISCONTINUED | OUTPATIENT
Start: 2021-04-26 | End: 2021-04-29 | Stop reason: HOSPADM

## 2021-04-26 RX ORDER — ACETAMINOPHEN 650 MG/1
650 SUPPOSITORY RECTAL EVERY 4 HOURS PRN
Status: DISCONTINUED | OUTPATIENT
Start: 2021-04-26 | End: 2021-04-29 | Stop reason: HOSPADM

## 2021-04-26 RX ORDER — QUETIAPINE FUMARATE 25 MG/1
25 TABLET, FILM COATED ORAL DAILY
Status: DISCONTINUED | OUTPATIENT
Start: 2021-04-27 | End: 2021-04-29 | Stop reason: HOSPADM

## 2021-04-26 RX ORDER — QUETIAPINE FUMARATE 100 MG/1
100 TABLET, FILM COATED ORAL NIGHTLY
COMMUNITY

## 2021-04-26 RX ORDER — SODIUM CHLORIDE 9 MG/ML
100 INJECTION, SOLUTION INTRAVENOUS CONTINUOUS
Status: DISCONTINUED | OUTPATIENT
Start: 2021-04-26 | End: 2021-04-27

## 2021-04-26 RX ORDER — ACETAMINOPHEN 325 MG/1
650 TABLET ORAL EVERY 4 HOURS PRN
Status: DISCONTINUED | OUTPATIENT
Start: 2021-04-26 | End: 2021-04-29 | Stop reason: HOSPADM

## 2021-04-26 RX ORDER — ONDANSETRON 4 MG/1
4 TABLET, FILM COATED ORAL EVERY 6 HOURS PRN
Status: DISCONTINUED | OUTPATIENT
Start: 2021-04-26 | End: 2021-04-29 | Stop reason: HOSPADM

## 2021-04-26 RX ORDER — ONDANSETRON 2 MG/ML
4 INJECTION INTRAMUSCULAR; INTRAVENOUS EVERY 6 HOURS PRN
Status: DISCONTINUED | OUTPATIENT
Start: 2021-04-26 | End: 2021-04-29 | Stop reason: HOSPADM

## 2021-04-26 RX ORDER — QUETIAPINE FUMARATE 100 MG/1
100 TABLET, FILM COATED ORAL NIGHTLY
Status: DISCONTINUED | OUTPATIENT
Start: 2021-04-26 | End: 2021-04-29 | Stop reason: HOSPADM

## 2021-04-26 RX ORDER — LEVOTHYROXINE SODIUM 0.07 MG/1
75 TABLET ORAL
Status: DISCONTINUED | OUTPATIENT
Start: 2021-04-27 | End: 2021-04-29 | Stop reason: HOSPADM

## 2021-04-26 RX ORDER — SODIUM CHLORIDE 0.9 % (FLUSH) 0.9 %
10 SYRINGE (ML) INJECTION EVERY 12 HOURS SCHEDULED
Status: DISCONTINUED | OUTPATIENT
Start: 2021-04-26 | End: 2021-04-29 | Stop reason: HOSPADM

## 2021-04-26 RX ADMIN — SODIUM CHLORIDE 100 ML/HR: 9 INJECTION, SOLUTION INTRAVENOUS at 22:59

## 2021-04-26 RX ADMIN — QUETIAPINE FUMARATE 100 MG: 100 TABLET ORAL at 23:46

## 2021-04-26 RX ADMIN — CEFTRIAXONE SODIUM 1 G: 1 INJECTION, POWDER, FOR SOLUTION INTRAMUSCULAR; INTRAVENOUS at 17:18

## 2021-04-26 RX ADMIN — CARBIDOPA AND LEVODOPA 2 TABLET: 25; 100 TABLET ORAL at 23:46

## 2021-04-27 ENCOUNTER — APPOINTMENT (OUTPATIENT)
Dept: MRI IMAGING | Facility: HOSPITAL | Age: 76
End: 2021-04-27

## 2021-04-27 LAB
ALBUMIN SERPL-MCNC: 3.4 G/DL (ref 3.5–5.2)
ALBUMIN/GLOB SERPL: 1.4 G/DL
ALP SERPL-CCNC: 135 U/L (ref 39–117)
ALT SERPL W P-5'-P-CCNC: <5 U/L (ref 1–33)
ANION GAP SERPL CALCULATED.3IONS-SCNC: 11 MMOL/L (ref 5–15)
AST SERPL-CCNC: 21 U/L (ref 1–32)
BASOPHILS # BLD AUTO: 0.02 10*3/MM3 (ref 0–0.2)
BASOPHILS NFR BLD AUTO: 0.2 % (ref 0–1.5)
BILIRUB SERPL-MCNC: 0.8 MG/DL (ref 0–1.2)
BUN SERPL-MCNC: 16 MG/DL (ref 8–23)
BUN/CREAT SERPL: 23.5 (ref 7–25)
CALCIUM SPEC-SCNC: 8.3 MG/DL (ref 8.6–10.5)
CHLORIDE SERPL-SCNC: 105 MMOL/L (ref 98–107)
CO2 SERPL-SCNC: 24 MMOL/L (ref 22–29)
CREAT SERPL-MCNC: 0.68 MG/DL (ref 0.57–1)
DEPRECATED RDW RBC AUTO: 47.1 FL (ref 37–54)
EOSINOPHIL # BLD AUTO: 0.12 10*3/MM3 (ref 0–0.4)
EOSINOPHIL NFR BLD AUTO: 1.2 % (ref 0.3–6.2)
ERYTHROCYTE [DISTWIDTH] IN BLOOD BY AUTOMATED COUNT: 13.8 % (ref 12.3–15.4)
FOLATE SERPL-MCNC: 18.5 NG/ML (ref 4.78–24.2)
GFR SERPL CREATININE-BSD FRML MDRD: 84 ML/MIN/1.73
GLOBULIN UR ELPH-MCNC: 2.4 GM/DL
GLUCOSE SERPL-MCNC: 95 MG/DL (ref 65–99)
HCT VFR BLD AUTO: 32.7 % (ref 34–46.6)
HGB BLD-MCNC: 10.5 G/DL (ref 12–15.9)
IMM GRANULOCYTES # BLD AUTO: 0.05 10*3/MM3 (ref 0–0.05)
IMM GRANULOCYTES NFR BLD AUTO: 0.5 % (ref 0–0.5)
LYMPHOCYTES # BLD AUTO: 2.01 10*3/MM3 (ref 0.7–3.1)
LYMPHOCYTES NFR BLD AUTO: 19.7 % (ref 19.6–45.3)
MCH RBC QN AUTO: 29.6 PG (ref 26.6–33)
MCHC RBC AUTO-ENTMCNC: 32.1 G/DL (ref 31.5–35.7)
MCV RBC AUTO: 92.1 FL (ref 79–97)
MONOCYTES # BLD AUTO: 1.31 10*3/MM3 (ref 0.1–0.9)
MONOCYTES NFR BLD AUTO: 12.9 % (ref 5–12)
NEUTROPHILS NFR BLD AUTO: 6.68 10*3/MM3 (ref 1.7–7)
NEUTROPHILS NFR BLD AUTO: 65.5 % (ref 42.7–76)
NRBC BLD AUTO-RTO: 0 /100 WBC (ref 0–0.2)
PLATELET # BLD AUTO: 233 10*3/MM3 (ref 140–450)
PMV BLD AUTO: 9.8 FL (ref 6–12)
POTASSIUM SERPL-SCNC: 3.5 MMOL/L (ref 3.5–5.2)
PROT SERPL-MCNC: 5.8 G/DL (ref 6–8.5)
RBC # BLD AUTO: 3.55 10*6/MM3 (ref 3.77–5.28)
SODIUM SERPL-SCNC: 140 MMOL/L (ref 136–145)
VIT B12 BLD-MCNC: >2000 PG/ML (ref 211–946)
WBC # BLD AUTO: 10.19 10*3/MM3 (ref 3.4–10.8)

## 2021-04-27 PROCEDURE — 80053 COMPREHEN METABOLIC PANEL: CPT | Performed by: INTERNAL MEDICINE

## 2021-04-27 PROCEDURE — 85025 COMPLETE CBC W/AUTO DIFF WBC: CPT | Performed by: INTERNAL MEDICINE

## 2021-04-27 PROCEDURE — A9577 INJ MULTIHANCE: HCPCS | Performed by: NEUROLOGICAL SURGERY

## 2021-04-27 PROCEDURE — 25010000002 CEFTRIAXONE PER 250 MG: Performed by: INTERNAL MEDICINE

## 2021-04-27 PROCEDURE — 72141 MRI NECK SPINE W/O DYE: CPT

## 2021-04-27 PROCEDURE — G0378 HOSPITAL OBSERVATION PER HR: HCPCS

## 2021-04-27 PROCEDURE — 99204 OFFICE O/P NEW MOD 45 MIN: CPT | Performed by: NEUROLOGICAL SURGERY

## 2021-04-27 PROCEDURE — 25010000002 ENOXAPARIN PER 10 MG: Performed by: NURSE PRACTITIONER

## 2021-04-27 PROCEDURE — 0 GADOBENATE DIMEGLUMINE 529 MG/ML SOLUTION: Performed by: NEUROLOGICAL SURGERY

## 2021-04-27 PROCEDURE — 97162 PT EVAL MOD COMPLEX 30 MIN: CPT | Performed by: PHYSICAL THERAPIST

## 2021-04-27 PROCEDURE — 96361 HYDRATE IV INFUSION ADD-ON: CPT

## 2021-04-27 PROCEDURE — 96372 THER/PROPH/DIAG INJ SC/IM: CPT

## 2021-04-27 PROCEDURE — 82746 ASSAY OF FOLIC ACID SERUM: CPT | Performed by: INTERNAL MEDICINE

## 2021-04-27 PROCEDURE — 82607 VITAMIN B-12: CPT | Performed by: INTERNAL MEDICINE

## 2021-04-27 PROCEDURE — 70553 MRI BRAIN STEM W/O & W/DYE: CPT

## 2021-04-27 RX ADMIN — ACETAMINOPHEN 650 MG: 325 TABLET ORAL at 09:57

## 2021-04-27 RX ADMIN — SODIUM CHLORIDE, PRESERVATIVE FREE 10 ML: 5 INJECTION INTRAVENOUS at 21:41

## 2021-04-27 RX ADMIN — CARBIDOPA AND LEVODOPA 2 TABLET: 25; 100 TABLET ORAL at 09:07

## 2021-04-27 RX ADMIN — ENOXAPARIN SODIUM 40 MG: 40 INJECTION SUBCUTANEOUS at 18:31

## 2021-04-27 RX ADMIN — SODIUM CHLORIDE 100 ML/HR: 9 INJECTION, SOLUTION INTRAVENOUS at 09:07

## 2021-04-27 RX ADMIN — SODIUM CHLORIDE, PRESERVATIVE FREE 10 ML: 5 INJECTION INTRAVENOUS at 00:00

## 2021-04-27 RX ADMIN — LEVOTHYROXINE SODIUM 75 MCG: 75 TABLET ORAL at 05:43

## 2021-04-27 RX ADMIN — GADOBENATE DIMEGLUMINE 16 ML: 529 INJECTION, SOLUTION INTRAVENOUS at 14:08

## 2021-04-27 RX ADMIN — CARBIDOPA AND LEVODOPA 2 TABLET: 25; 100 TABLET ORAL at 21:40

## 2021-04-27 RX ADMIN — CARBIDOPA AND LEVODOPA 2 TABLET: 25; 100 TABLET ORAL at 16:55

## 2021-04-27 RX ADMIN — QUETIAPINE FUMARATE 100 MG: 100 TABLET ORAL at 21:40

## 2021-04-27 RX ADMIN — CEFTRIAXONE SODIUM 1 G: 1 INJECTION, POWDER, FOR SOLUTION INTRAMUSCULAR; INTRAVENOUS at 16:54

## 2021-04-27 RX ADMIN — QUETIAPINE FUMARATE 25 MG: 25 TABLET, FILM COATED ORAL at 09:07

## 2021-04-28 ENCOUNTER — APPOINTMENT (OUTPATIENT)
Dept: MRI IMAGING | Facility: HOSPITAL | Age: 76
End: 2021-04-28

## 2021-04-28 PROBLEM — D50.9 IRON DEFICIENCY ANEMIA: Status: ACTIVE | Noted: 2021-04-26

## 2021-04-28 LAB
BACTERIA UR QL AUTO: ABNORMAL /HPF
BILIRUB UR QL STRIP: NEGATIVE
CLARITY UR: CLEAR
COLOR UR: ABNORMAL
GLUCOSE UR STRIP-MCNC: NEGATIVE MG/DL
HGB UR QL STRIP.AUTO: ABNORMAL
HYALINE CASTS UR QL AUTO: ABNORMAL /LPF
KETONES UR QL STRIP: ABNORMAL
LEUKOCYTE ESTERASE UR QL STRIP.AUTO: NEGATIVE
NITRITE UR QL STRIP: NEGATIVE
PH UR STRIP.AUTO: 6 [PH] (ref 5–8)
PROT UR QL STRIP: ABNORMAL
RBC # UR: ABNORMAL /HPF
REF LAB TEST METHOD: ABNORMAL
SP GR UR STRIP: 1.02 (ref 1–1.03)
SQUAMOUS #/AREA URNS HPF: ABNORMAL /HPF
UROBILINOGEN UR QL STRIP: ABNORMAL
WBC UR QL AUTO: ABNORMAL /HPF

## 2021-04-28 PROCEDURE — G0378 HOSPITAL OBSERVATION PER HR: HCPCS

## 2021-04-28 PROCEDURE — 99214 OFFICE O/P EST MOD 30 MIN: CPT | Performed by: NURSE PRACTITIONER

## 2021-04-28 PROCEDURE — 97110 THERAPEUTIC EXERCISES: CPT

## 2021-04-28 PROCEDURE — 96372 THER/PROPH/DIAG INJ SC/IM: CPT

## 2021-04-28 PROCEDURE — 25010000002 ENOXAPARIN PER 10 MG: Performed by: NURSE PRACTITIONER

## 2021-04-28 PROCEDURE — 25010000002 CEFTRIAXONE PER 250 MG: Performed by: INTERNAL MEDICINE

## 2021-04-28 PROCEDURE — 97165 OT EVAL LOW COMPLEX 30 MIN: CPT

## 2021-04-28 PROCEDURE — 96366 THER/PROPH/DIAG IV INF ADDON: CPT

## 2021-04-28 PROCEDURE — 72148 MRI LUMBAR SPINE W/O DYE: CPT

## 2021-04-28 PROCEDURE — 97530 THERAPEUTIC ACTIVITIES: CPT

## 2021-04-28 PROCEDURE — 81001 URINALYSIS AUTO W/SCOPE: CPT | Performed by: NURSE PRACTITIONER

## 2021-04-28 RX ADMIN — QUETIAPINE FUMARATE 25 MG: 25 TABLET, FILM COATED ORAL at 08:50

## 2021-04-28 RX ADMIN — QUETIAPINE FUMARATE 100 MG: 100 TABLET ORAL at 20:00

## 2021-04-28 RX ADMIN — CEFTRIAXONE SODIUM 1 G: 1 INJECTION, POWDER, FOR SOLUTION INTRAMUSCULAR; INTRAVENOUS at 17:48

## 2021-04-28 RX ADMIN — ENOXAPARIN SODIUM 40 MG: 40 INJECTION SUBCUTANEOUS at 17:48

## 2021-04-28 RX ADMIN — LEVOTHYROXINE SODIUM 75 MCG: 75 TABLET ORAL at 05:05

## 2021-04-28 RX ADMIN — CARBIDOPA AND LEVODOPA 1 TABLET: 25; 100 TABLET ORAL at 19:59

## 2021-04-28 RX ADMIN — CARBIDOPA AND LEVODOPA 2 TABLET: 25; 100 TABLET ORAL at 08:51

## 2021-04-28 RX ADMIN — CARBIDOPA AND LEVODOPA 1 TABLET: 25; 100 TABLET ORAL at 17:48

## 2021-04-28 RX ADMIN — SODIUM CHLORIDE, PRESERVATIVE FREE 10 ML: 5 INJECTION INTRAVENOUS at 08:50

## 2021-04-29 VITALS
HEART RATE: 100 BPM | SYSTOLIC BLOOD PRESSURE: 134 MMHG | OXYGEN SATURATION: 97 % | WEIGHT: 178.9 LBS | DIASTOLIC BLOOD PRESSURE: 64 MMHG | TEMPERATURE: 98.8 F | HEIGHT: 66 IN | RESPIRATION RATE: 18 BRPM | BODY MASS INDEX: 28.75 KG/M2

## 2021-04-29 LAB — SARS-COV-2 RNA PNL SPEC NAA+PROBE: NOT DETECTED

## 2021-04-29 PROCEDURE — 25010000002 ENOXAPARIN PER 10 MG: Performed by: NURSE PRACTITIONER

## 2021-04-29 PROCEDURE — G0378 HOSPITAL OBSERVATION PER HR: HCPCS

## 2021-04-29 PROCEDURE — 87635 SARS-COV-2 COVID-19 AMP PRB: CPT | Performed by: NURSE PRACTITIONER

## 2021-04-29 PROCEDURE — 97535 SELF CARE MNGMENT TRAINING: CPT

## 2021-04-29 PROCEDURE — 97110 THERAPEUTIC EXERCISES: CPT | Performed by: PHYSICAL THERAPIST

## 2021-04-29 PROCEDURE — 97530 THERAPEUTIC ACTIVITIES: CPT | Performed by: PHYSICAL THERAPIST

## 2021-04-29 PROCEDURE — 96372 THER/PROPH/DIAG INJ SC/IM: CPT

## 2021-04-29 RX ORDER — ACETAMINOPHEN 325 MG/1
650 TABLET ORAL EVERY 4 HOURS PRN
Start: 2021-04-29

## 2021-04-29 RX ORDER — FERROUS SULFATE 325(65) MG
325 TABLET ORAL
Start: 2021-04-29

## 2021-04-29 RX ORDER — BISACODYL 10 MG
10 SUPPOSITORY, RECTAL RECTAL DAILY
Status: DISCONTINUED | OUTPATIENT
Start: 2021-04-29 | End: 2021-04-29 | Stop reason: HOSPADM

## 2021-04-29 RX ADMIN — QUETIAPINE FUMARATE 100 MG: 100 TABLET ORAL at 20:00

## 2021-04-29 RX ADMIN — CARBIDOPA AND LEVODOPA 1 TABLET: 25; 100 TABLET ORAL at 10:09

## 2021-04-29 RX ADMIN — SODIUM CHLORIDE, PRESERVATIVE FREE 10 ML: 5 INJECTION INTRAVENOUS at 08:34

## 2021-04-29 RX ADMIN — CARBIDOPA AND LEVODOPA 1 TABLET: 25; 100 TABLET ORAL at 20:00

## 2021-04-29 RX ADMIN — CARBIDOPA AND LEVODOPA 1 TABLET: 25; 100 TABLET ORAL at 13:22

## 2021-04-29 RX ADMIN — LEVOTHYROXINE SODIUM 75 MCG: 75 TABLET ORAL at 08:34

## 2021-04-29 RX ADMIN — ENOXAPARIN SODIUM 40 MG: 40 INJECTION SUBCUTANEOUS at 18:44

## 2021-04-29 RX ADMIN — CARBIDOPA AND LEVODOPA 1 TABLET: 25; 100 TABLET ORAL at 17:36

## 2021-04-29 RX ADMIN — QUETIAPINE FUMARATE 25 MG: 25 TABLET, FILM COATED ORAL at 08:34

## 2021-04-29 RX ADMIN — BISACODYL 10 MG: 10 SUPPOSITORY RECTAL at 17:36

## 2021-04-29 RX ADMIN — CARBIDOPA AND LEVODOPA 1 TABLET: 25; 100 TABLET ORAL at 08:34

## 2021-06-04 ENCOUNTER — TRANSCRIBE ORDERS (OUTPATIENT)
Dept: HOME HEALTH SERVICES | Facility: HOME HEALTHCARE | Age: 76
End: 2021-06-04

## 2021-06-04 ENCOUNTER — HOME HEALTH ADMISSION (OUTPATIENT)
Dept: HOME HEALTH SERVICES | Facility: HOME HEALTHCARE | Age: 76
End: 2021-06-04

## 2021-06-04 DIAGNOSIS — I10 ESSENTIAL HYPERTENSION, MALIGNANT: Primary | ICD-10-CM

## 2021-06-07 ENCOUNTER — HOME CARE VISIT (OUTPATIENT)
Dept: HOME HEALTH SERVICES | Facility: CLINIC | Age: 76
End: 2021-06-07

## 2021-06-07 VITALS
BODY MASS INDEX: 21.67 KG/M2 | OXYGEN SATURATION: 96 % | TEMPERATURE: 97.1 F | WEIGHT: 160 LBS | SYSTOLIC BLOOD PRESSURE: 122 MMHG | RESPIRATION RATE: 16 BRPM | HEART RATE: 89 BPM | HEIGHT: 72 IN | DIASTOLIC BLOOD PRESSURE: 72 MMHG

## 2021-06-07 PROCEDURE — G0151 HHCP-SERV OF PT,EA 15 MIN: HCPCS

## 2021-06-07 NOTE — HOME HEALTH
77 yo female hospitalized  4-26-21 with SNF placement thru 6-4-21 per Dx: transverse fracture L5 and comminuted displaced left superior and inferior pubic rami fractures.  Pt with hx of Parkinsons dx with pt falling on  4-26-21.  ( WBAT per chart review ) .  Pt requires min A for bed mobility ( sleeps in recliner majority) ,transfers and amb  . Pt amb 80 ' festinating gait with VCs - TCs fo AD placement safety .  Pt son called and suggested lifeline for safety and to consider MSW consult for community resources per safety and pt goal to stay home         ( Parkinsons - epsiodal double vision )

## 2021-06-08 ENCOUNTER — HOME CARE VISIT (OUTPATIENT)
Dept: HOME HEALTH SERVICES | Facility: CLINIC | Age: 76
End: 2021-06-08

## 2021-06-08 ENCOUNTER — HOME CARE VISIT (OUTPATIENT)
Dept: HOME HEALTH SERVICES | Facility: HOME HEALTHCARE | Age: 76
End: 2021-06-08

## 2021-06-08 PROCEDURE — G0152 HHCP-SERV OF OT,EA 15 MIN: HCPCS

## 2021-06-08 NOTE — CASE COMMUNICATION
SOC HUDDLE NOTE    Problems identified: FALL IN APRIL, NONSURGICAL PELVIC FX    Disease processes: NONSURGICAL PELVIC FX, PARKINSONS, DEMENTIA, FALLS, UTI, OSTEOPOROSIS, ANEMIA  Acuity and severity: LEVEL 3  Medication Management: FAMILY  Care procedures: NONE    Caregiver Status: PT'S FAMILY IS PRESENT FOR CARE. PT LIVES ALONE.  Availability: DAYTIME  Ability to meet patient's needs: ABLE  Willingness: WILLING    Risk for Hospitalization: HIGH    Patient's stated goal(s): TO MINIMIZE FALLS    Services required to achieve goals: PT, OT, MSW ORDER NEEDED    Potential Issues for goal attainment: PARKINSON'S AND DEMENTIA    Functional status and safety: FALL RISK    Mobility: UP WITH ROLLING WALKER    Self-care:     ADLs:     Environmental issues? STEPS LEADING INTO THE HOME AND STEP IN THE HOUSE  Physical environment:     Devices for care present in the home:   Devices needed and not present: NONE NEEDED    Community resources involved/needed:   Dialysis: NO  Transportation: NO  Meals on Wheels: NO    Any additional needs:    LUPA THRESHOLD:   1st 30 day - 6  2nd 30 day - 2    Based upon record review and collaboration conference, the recommended frequency for this patient is:    PT - 2W3  OT - PENDING EVAL  MSW - WAITING ON EVAL ORDER    Please note that above is a recommendation only and that the plan of care should reflect the patient's clinical needs and goals. If in agreement, please complete note. If a different frequency is necessary, please explain in note box and proceed per patients clinical needs.

## 2021-06-09 VITALS
OXYGEN SATURATION: 99 % | SYSTOLIC BLOOD PRESSURE: 138 MMHG | RESPIRATION RATE: 18 BRPM | DIASTOLIC BLOOD PRESSURE: 73 MMHG | HEART RATE: 73 BPM | TEMPERATURE: 98.6 F

## 2021-06-11 ENCOUNTER — HOME CARE VISIT (OUTPATIENT)
Dept: HOME HEALTH SERVICES | Facility: CLINIC | Age: 76
End: 2021-06-11

## 2021-06-11 VITALS — HEART RATE: 77 BPM | TEMPERATURE: 98.9 F | OXYGEN SATURATION: 99 % | RESPIRATION RATE: 16 BRPM

## 2021-06-11 PROCEDURE — G0157 HHC PT ASSISTANT EA 15: HCPCS

## 2021-06-11 NOTE — HOME HEALTH
Patient states she is doing fairly well today. She denies falls or medicine changes. She reports pain in the left groin and thigh region but states it is not a severe today as it has been in the past.

## 2021-06-14 ENCOUNTER — HOME CARE VISIT (OUTPATIENT)
Dept: HOME HEALTH SERVICES | Facility: CLINIC | Age: 76
End: 2021-06-14

## 2021-06-14 VITALS
SYSTOLIC BLOOD PRESSURE: 140 MMHG | TEMPERATURE: 97 F | HEART RATE: 84 BPM | OXYGEN SATURATION: 98 % | DIASTOLIC BLOOD PRESSURE: 80 MMHG

## 2021-06-14 PROCEDURE — G0157 HHC PT ASSISTANT EA 15: HCPCS

## 2021-06-14 NOTE — CASE COMMUNICATION
PATIENT IS VERY CONFUSED TODAY.  HAVING HALLUCINATIONS.  SON SUSPECTS UTI.  SON, YOLANDA, AGREES TO GET PATIENT TO APPT WITH DR. TIMMONS ON 6/15/21 AT 1115.

## 2021-06-15 ENCOUNTER — HOME CARE VISIT (OUTPATIENT)
Dept: HOME HEALTH SERVICES | Facility: CLINIC | Age: 76
End: 2021-06-15

## 2021-06-15 VITALS
RESPIRATION RATE: 16 BRPM | OXYGEN SATURATION: 98 % | SYSTOLIC BLOOD PRESSURE: 90 MMHG | HEART RATE: 83 BPM | DIASTOLIC BLOOD PRESSURE: 50 MMHG | TEMPERATURE: 98.6 F

## 2021-06-15 PROCEDURE — G0152 HHCP-SERV OF OT,EA 15 MIN: HCPCS

## 2021-06-16 ENCOUNTER — HOME CARE VISIT (OUTPATIENT)
Dept: HOME HEALTH SERVICES | Facility: CLINIC | Age: 76
End: 2021-06-16

## 2021-06-16 VITALS
SYSTOLIC BLOOD PRESSURE: 112 MMHG | HEART RATE: 95 BPM | DIASTOLIC BLOOD PRESSURE: 62 MMHG | OXYGEN SATURATION: 94 % | TEMPERATURE: 95.9 F | RESPIRATION RATE: 16 BRPM

## 2021-06-16 PROCEDURE — G0151 HHCP-SERV OF PT,EA 15 MIN: HCPCS

## 2021-06-16 NOTE — HOME HEALTH
Patient asleep in chair on arrival. Son, Ender stated earlier that patient had an appointment in the am concerning a UTI. Patient lethargic and confused this date. Patient completed walking to bathroom, but stated she did not need to go on arrival. Patient states her sons check on her 3 daily and that someone would be checking on her soon. Patient returned to chair and elevated legs to rest. Phoned Ender,  her son to inform him of patient being fatigued and not feeling well. Left message on voicemail.

## 2021-06-24 ENCOUNTER — HOME CARE VISIT (OUTPATIENT)
Dept: HOME HEALTH SERVICES | Facility: CLINIC | Age: 76
End: 2021-06-24

## 2021-06-24 VITALS
OXYGEN SATURATION: 98 % | RESPIRATION RATE: 16 BRPM | SYSTOLIC BLOOD PRESSURE: 98 MMHG | DIASTOLIC BLOOD PRESSURE: 60 MMHG | TEMPERATURE: 98.9 F

## 2021-06-24 PROCEDURE — G0157 HHC PT ASSISTANT EA 15: HCPCS

## 2021-06-24 NOTE — HOME HEALTH
Patient states she hasn't really been doing her exercises. She says she doesn't really know why. She says has a little pain in her legs today. She says she is a little afraid to use her new rollator because it goes too fast.

## 2021-06-25 ENCOUNTER — HOME CARE VISIT (OUTPATIENT)
Dept: HOME HEALTH SERVICES | Facility: CLINIC | Age: 76
End: 2021-06-25

## 2021-06-25 VITALS
RESPIRATION RATE: 18 BRPM | OXYGEN SATURATION: 99 % | TEMPERATURE: 98.6 F | DIASTOLIC BLOOD PRESSURE: 78 MMHG | HEART RATE: 70 BPM | SYSTOLIC BLOOD PRESSURE: 124 MMHG

## 2021-06-25 PROCEDURE — G0152 HHCP-SERV OF OT,EA 15 MIN: HCPCS

## 2021-06-25 NOTE — HOME HEALTH
OT VISIT NOTE:  Subjective: Patient sitting in floor on arrival and states she was sitting in recliner and when raising it, raised it too high and slid to the floor. Helped patient up, no injuries, vitals WNL. Notified family and physician.    Falls: See above  Medication changes: None reported  Insurance changes: None reported    D/C plan: when goals met or max rehab potential achieved  Frequency 1wk2   Plan for next visit: UB HEP and safety education with AE.  Next MD appointment: August

## 2021-06-29 ENCOUNTER — HOME CARE VISIT (OUTPATIENT)
Dept: HOME HEALTH SERVICES | Facility: CLINIC | Age: 76
End: 2021-06-29

## 2021-06-29 VITALS
HEART RATE: 81 BPM | RESPIRATION RATE: 18 BRPM | DIASTOLIC BLOOD PRESSURE: 74 MMHG | OXYGEN SATURATION: 98 % | SYSTOLIC BLOOD PRESSURE: 128 MMHG | TEMPERATURE: 98.8 F

## 2021-06-29 PROCEDURE — G0152 HHCP-SERV OF OT,EA 15 MIN: HCPCS

## 2021-07-08 RX ORDER — QUETIAPINE FUMARATE 100 MG/1
100 TABLET, FILM COATED ORAL EVERY EVENING
Qty: 90 TABLET | Refills: 1 | Status: SHIPPED | OUTPATIENT
Start: 2021-07-08 | End: 2022-01-04

## 2021-07-08 NOTE — TELEPHONE ENCOUNTER
Requested Prescriptions     Pending Prescriptions Disp Refills    carbidopa-levodopa (SINEMET)  MG per tablet [Pharmacy Med Name: CARBIDOPA/LEVODOPA 25-100MG TABS] 150 tablet 5     Sig: TAKE 1 TABLET BY MOUTH FIVE TIMES DAILY    QUEtiapine (SEROQUEL) 100 MG tablet [Pharmacy Med Name: QUETIAPINE 100MG TABLETS] 90 tablet 1     Sig: TAKE 1 TABLET BY MOUTH EVERY EVENING       Last Office Visit: 4/14/2021  Next Office Visit: 8/16/2021  Last Medication Refill:  Seroquel - 1/13/21 with 1 refill Sinemet- 12/7/20 with 5 refills English

## 2021-08-16 ENCOUNTER — OFFICE VISIT (OUTPATIENT)
Dept: NEUROSURGERY | Age: 76
End: 2021-08-16
Payer: MEDICARE

## 2021-08-16 VITALS
TEMPERATURE: 97.8 F | WEIGHT: 125 LBS | HEART RATE: 78 BPM | HEIGHT: 66 IN | SYSTOLIC BLOOD PRESSURE: 102 MMHG | OXYGEN SATURATION: 98 % | BODY MASS INDEX: 20.09 KG/M2 | DIASTOLIC BLOOD PRESSURE: 52 MMHG

## 2021-08-16 DIAGNOSIS — R44.3 HALLUCINATIONS: ICD-10-CM

## 2021-08-16 DIAGNOSIS — G20 PARKINSON DISEASE (HCC): Primary | ICD-10-CM

## 2021-08-16 PROCEDURE — 99213 OFFICE O/P EST LOW 20 MIN: CPT | Performed by: NURSE PRACTITIONER

## 2021-08-16 NOTE — PROGRESS NOTES
[id number] [facility] Neurology Office Note      Patient:   Adrian Kimbrough  MR#:    [id number]  Account Number:                         YOB: 1945  Date of Evaluation:  8/16/2021  Time of Note:                          1:09 PM  Primary/Referring Physician:  Rachelle Cole MD   Consulting Physician:  Chip Alejo DNP, APRN     FOLLOW UP    Chief Complaint   Patient presents with    Follow-up     c/o tremor and falls. Pt family states son found her in the floor    Tremors    Hallucinations     c/o seeing things that is not there. people in the house, having an upstairs to her house. doesnt listen when family tells her it isnt there       Delfino Kline is a 64y.o. year old female here follow up of Parkinson's, memory loss. She is accompanied by her son today. She fell the other day, small cut on her forehead, bruising noted to face. No other clear injury. Tremor will wax and wane. No gait changes. Intermittent bradykinesia noted. Denies falls. Some mild dyskinesias noted today. Still noting hallucinations, both auditory and visual hallucinations. She is on Seroquel 75mg nightly, unable to tolerate higher doses of this previously. Failed Nuplazid several years ago now, family wants to re try this. Memory loss is unchanged. Primarily short term in nature. Does not interfere with ADLs. Using a [employer] pill organizer for her medications. She does not drive. Has a personal care aide now that is coming twice a day. Her sons are involved in her care, check on her daily. She was on Neurontin for RLS but family felt this made hallucinations worse. RLS symptoms unchanged. She was diagnosed with PD in 2000 at [facility], followed by Dr. Ryan Vazquez before as well. Has tried and failed Requip, Sinemet CR and Xadago.      Past Medical History:   Diagnosis Date    Hyperthyroidism     Parkinson disease ([facility] [address]        Past Surgical History:   Procedure Laterality Date    BRAIN SURGERY      CHOLECYSTECTOMY      THYROIDECTOMY, PARTIAL         History reviewed. No pertinent family history. Social History     Socioeconomic History    Marital status:      Spouse name: Not on file    Number of children: Not on file    Years of education: Not on file    Highest education level: Not on file   Occupational History    Not on file   Tobacco Use    Smoking status: Never Smoker    Smokeless tobacco: Never Used   Vaping Use    Vaping Use: Never used   Substance and Sexual Activity    Alcohol use: No    Drug use: No    Sexual activity: Not Currently     Partners: Male   Other Topics Concern    Not on file   Social History Narrative    Not on file     Social Determinants of Health     Financial Resource Strain:     Difficulty of Paying Living Expenses:    Food Insecurity:     Worried About Running Out of Food in the Last Year:     [address] in the Last Year:    Transportation Needs:     Lack of Transportation (Medical):      Lack of Transportation (Non-Medical):    Physical Activity:     Days of Exercise per Week:     Minutes of Exercise per Session:    Stress:     Feeling of Stress :    Social Connections:     Frequency of Communication with Friends and Family:     Frequency of Social Gatherings with Friends and Family:     Attends Jew Services:     Active Member of Clubs or Organizations:     Attends Club or Organization Meetings:     Marital Status:    Intimate Partner Violence:     Fear of Current or Ex-Partner:     Emotionally Abused:     Physically Abused:     Sexually Abused:        Current Outpatient Medications   Medication Sig Dispense Refill    carbidopa-levodopa (SINEMET)  MG per tablet TAKE 1 TABLET BY MOUTH FOUR TIMES DAILY 150 tablet 5    QUEtiapine (SEROQUEL) 100 MG tablet TAKE 1 TABLET BY MOUTH EVERY EVENING 90 tablet 1    Turmeric (QC TUMERIC COMPLEX) 500 MG CAPS Take 1,000 mg by mouth 2 times daily      QUEtiapine (SEROQUEL) 25 MG tablet Take 1 tablet in the morning 60 tablet 2    loratadine (CLARITIN) 10 MG capsule Take 10 mg by mouth daily      Multiple Vitamin (MULTI VITAMIN DAILY PO) Take by mouth      Ascorbic Acid (SHANNA-C PO) Take by mouth      Cyanocobalamin (VITAMIN B 12 PO) Take by mouth      Magnesium 100 MG CAPS Take by mouth Not sure of the mg      calcium carbonate (OSCAL) 500 MG TABS tablet Take 500 mg by mouth daily Not sure of the mg      levothyroxine (SYNTHROID) 75 MCG tablet Take 75 mcg by mouth daily        No current facility-administered medications for this visit. Allergies   Allergen Reactions    Contrast [Iodides]     Sulfa Antibiotics     Nickel Rash     REVIEW OF SYSTEMS  Constitutional: []? Fever []? Sweats []? Chills []? Recent Injury [x]? Denies all unless marked  HEENT:[]? Headache  []? Head Injury []? Hearing Loss  []? Sore Throat  []? Ear Ache [x]? Denies all unless marked  Spine:  []? Neck pain  []? Back pain  []? Sciaticia  [x]? Denies all unless marked  Cardiovascular:[]? Heart Disease []? Palpitations []? Chest Pain   [x]? Denies all unless marked  Pulmonary: []? Shortness of Breath []? Cough   [x]? Denies all unless marked  Psychiatric/Behavioral:[]? Depression []? Anxiety [x]? Denies all unless marked  Gastrointestinal: []? Nausea  []? Vomiting  []? Abdominal Pain  []? Constipation  []? Diarrhea  [x]? Denies all unless marked  Genitourinary:   []? Frequency  []? Urgency  []? Dysuria []? Incontinence  [x]? Denies all unless marked  Extremities: []? Pain  []? Swelling  [x]? Denies all unless marked  Musculoskeletal: []? Myalgias  []? Joint Pain  []? Arthritis []? Muscle Cramps []? Muscle Twitches  [x]? Denies all unless marked  Sleep: []? Insomnia[]? Snoring []? Restless Legs  []? Sleep Apnea  []? Daytime Sleepiness  [x]? Denies all unless marked  Skin:[]? Rash []? Color Change [x]? Denies all unless marked   Neurological:[]? Visual Disturbance []? Memory Loss []? Loss of Balance []? Slurred Speech []? Weakness []?Seizures  []? Dizziness [x]? Denies all unless marked    The MA has completed the ROS with the patient. I have reviewed it in its' entirety with the patient and agree with the documentation. PHYSICAL EXAM    Constitutional -   BP (!) 102/52   Pulse 78   Temp 97.8 °F (36.6 °C)   Ht 5' 6\" (1.676 m)   Wt 125 lb (56.7 kg)   SpO2 98%   Breastfeeding No   BMI 20.18 kg/m²    General appearance: No acute distress   EYES -   Conjunctiva normal  Pupillary exam as below, see CN exam in the neurologic exam  ENT-    No scars, masses, or lesions over external nose or ears  Hearing normal bilaterally to finger rub  Cardiovascular -   No clubbing, cyanosis, or edema   Pulmonary-   Good expansion, normal effort without use of accessory muscles  Musculoskeletal -   No significant wasting of muscles noted  Gait as below, see gait exam in the neurologic exam  Muscle strength, tone, stability as below. No bony deformities  Skin -   Warm, dry, and intact to inspection and palpation. No rash, erythema, or pallor  Psychiatric -   Mood, affect, and behavior appear normal    Memory as below see mental status examination in the neurologic exam    NEUROLOGICAL EXAM    Mental status   [x]Awake, alert, oriented   [x]Affect attention and concentration appear appropriate  [x]Recent and remote memory appears unremarkable  [x]Speech normal without dysarthria or aphasia, comprehension and repetition intact.    COMMENTS:    Cranial Nerves [x]No VF deficit to confrontation,  no papilledema on fundoscopic exam.  [x]PERRLA, EOMI, no nystagmus, conjugate eye movements, no ptosis  [x]Face symmetric  [x]Facial sensation intact  [x]Tongue midline no atrophy or fasciculations present  [x]Palate midline, hearing to finger rub normal bilaterally  [x]Shoulder shrug and SCM testing normal bilaterally  COMMENTS:   Motor   []5/5 strength x 4 extremities  []Normal bulk and tone  []No tremor present  []No rigidity or bradykinesia noted  COMMENTS: mild resting tremor bilaterally, mild bradykinesia, no rigidity. Mild dyskinesias    Sensory  []Sensation intact to light touch, pin prick, vibration, and proprioception BLE  []Sensation intact to light touch, pin prick, vibration, and proprioception BUE  COMMENTS: Decreased LT, PP, Vib BLE   Coordination [x]FTN normal bilaterally   []HTS normal bilaterally  []TAL normal bilaterally. COMMENTS:   Reflexes  [x]Symmetric and non-pathological  [x]Toes down going bilaterally  [x]No clonus present  COMMENTS:   Gait                  []Normal steady gait    []Ataxic    []Spastic     []Magnetic     [x]Shuffling  COMMENTS: unsteady        LABS RECORD AND IMAGING REVIEW (As below and per HPI)  Reviewed prior records     ASSESSMENT:    Miryam Godinez is a 64y.o. year old female here for follow up of Parkinson's disease. Has noted some progression of symptoms. She did have a recent fall. Her gait is very unsteady today. She has mild dyskinesias on exam today. Continues to note hallucinations as well. Will decrease Sinemet to 1 tablet QID given dyskinesias. She has been on Sinemet CR previously but noted dyskinesias while on this. She is not been able to tolerate dopamine agonists in the past (Requip). She is also tried MAO inhibitors as well Daren Epperson). Given hallucinations as well would not re try dopamaine agonists. On Seroquel for hallucinations, still noting these. Family is wanting to try Nuplazid again, previously it did not seem to help. Will re-try this today and wean Seroquel. Has been unable to tolerate higher doses of Seroquel previously. Memory is unchanged, adding memory agent felt low yield today but will need to monitor. She has a personal care aide coming to her home twice daily and family is quite involved in care as well. Previously followed at [facility] and by Dr. Mik Small, diagnosed about 20 years ago. Diagnosis Orders   1. Parkinson disease ([facility] [address]     2. Hallucinations          PLAN:  1.  Decrease Sinemet 25/100 to four times daily, monitor for improvement of dyskinesias. 2. Trial of Nuplazid 34mg daily. 3. Decrease Seroquel after Nuplazid has been started, discussed weaning with son. 4. Increase supervision at home, fall precautions, no driving, medication monitoring   5. Continue to follow memory clinically, add memory agent with any progression.    6. Follow up in 2 months, sooner with any worsening      Siddharth Suarez DNP, APRN

## 2021-09-07 ENCOUNTER — APPOINTMENT (OUTPATIENT)
Dept: GENERAL RADIOLOGY | Facility: HOSPITAL | Age: 76
End: 2021-09-07

## 2021-09-07 ENCOUNTER — APPOINTMENT (OUTPATIENT)
Dept: CT IMAGING | Facility: HOSPITAL | Age: 76
End: 2021-09-07

## 2021-09-07 ENCOUNTER — HOSPITAL ENCOUNTER (EMERGENCY)
Facility: HOSPITAL | Age: 76
Discharge: HOME | End: 2021-09-08
Attending: FAMILY MEDICINE | Admitting: FAMILY MEDICINE

## 2021-09-07 ENCOUNTER — APPOINTMENT (OUTPATIENT)
Dept: ULTRASOUND IMAGING | Facility: HOSPITAL | Age: 76
End: 2021-09-07

## 2021-09-07 DIAGNOSIS — I82.461 ACUTE DEEP VEIN THROMBOSIS (DVT) OF CALF MUSCLE VEIN OF RIGHT LOWER EXTREMITY (HCC): Primary | ICD-10-CM

## 2021-09-07 DIAGNOSIS — I82.451 ACUTE DEEP VEIN THROMBOSIS (DVT) OF RIGHT PERONEAL VEIN (HCC): ICD-10-CM

## 2021-09-07 LAB
ALBUMIN SERPL-MCNC: 4.1 G/DL (ref 3.5–5.2)
ALBUMIN/GLOB SERPL: 1.5 G/DL
ALP SERPL-CCNC: 219 U/L (ref 39–117)
ALT SERPL W P-5'-P-CCNC: 7 U/L (ref 1–33)
ANION GAP SERPL CALCULATED.3IONS-SCNC: 9 MMOL/L (ref 5–15)
APTT PPP: 28.2 SECONDS (ref 24.1–35)
AST SERPL-CCNC: 60 U/L (ref 1–32)
BASOPHILS # BLD AUTO: 0.03 10*3/MM3 (ref 0–0.2)
BASOPHILS NFR BLD AUTO: 0.3 % (ref 0–1.5)
BILIRUB SERPL-MCNC: 0.6 MG/DL (ref 0–1.2)
BUN SERPL-MCNC: 19 MG/DL (ref 8–23)
BUN/CREAT SERPL: 25.3 (ref 7–25)
CALCIUM SPEC-SCNC: 9.1 MG/DL (ref 8.6–10.5)
CHLORIDE SERPL-SCNC: 105 MMOL/L (ref 98–107)
CO2 SERPL-SCNC: 25 MMOL/L (ref 22–29)
CREAT SERPL-MCNC: 0.75 MG/DL (ref 0.57–1)
DEPRECATED RDW RBC AUTO: 50 FL (ref 37–54)
EOSINOPHIL # BLD AUTO: 0.15 10*3/MM3 (ref 0–0.4)
EOSINOPHIL NFR BLD AUTO: 1.3 % (ref 0.3–6.2)
ERYTHROCYTE [DISTWIDTH] IN BLOOD BY AUTOMATED COUNT: 14.6 % (ref 12.3–15.4)
GFR SERPL CREATININE-BSD FRML MDRD: 75 ML/MIN/1.73
GLOBULIN UR ELPH-MCNC: 2.7 GM/DL
GLUCOSE BLDC GLUCOMTR-MCNC: 119 MG/DL (ref 70–130)
GLUCOSE SERPL-MCNC: 140 MG/DL (ref 65–99)
HCT VFR BLD AUTO: 40.4 % (ref 34–46.6)
HGB BLD-MCNC: 12.8 G/DL (ref 12–15.9)
IMM GRANULOCYTES # BLD AUTO: 0.03 10*3/MM3 (ref 0–0.05)
IMM GRANULOCYTES NFR BLD AUTO: 0.3 % (ref 0–0.5)
INR PPP: 0.99 (ref 0.91–1.09)
LYMPHOCYTES # BLD AUTO: 2.92 10*3/MM3 (ref 0.7–3.1)
LYMPHOCYTES NFR BLD AUTO: 25.9 % (ref 19.6–45.3)
MCH RBC QN AUTO: 29.2 PG (ref 26.6–33)
MCHC RBC AUTO-ENTMCNC: 31.7 G/DL (ref 31.5–35.7)
MCV RBC AUTO: 92.2 FL (ref 79–97)
MONOCYTES # BLD AUTO: 0.91 10*3/MM3 (ref 0.1–0.9)
MONOCYTES NFR BLD AUTO: 8.1 % (ref 5–12)
NEUTROPHILS NFR BLD AUTO: 64.1 % (ref 42.7–76)
NEUTROPHILS NFR BLD AUTO: 7.25 10*3/MM3 (ref 1.7–7)
NRBC BLD AUTO-RTO: 0 /100 WBC (ref 0–0.2)
NT-PROBNP SERPL-MCNC: 91.9 PG/ML (ref 0–1800)
PLATELET # BLD AUTO: 221 10*3/MM3 (ref 140–450)
PMV BLD AUTO: 9.9 FL (ref 6–12)
POTASSIUM SERPL-SCNC: 4.2 MMOL/L (ref 3.5–5.2)
PROT SERPL-MCNC: 6.8 G/DL (ref 6–8.5)
PROTHROMBIN TIME: 12.3 SECONDS (ref 11.5–13.4)
RBC # BLD AUTO: 4.38 10*6/MM3 (ref 3.77–5.28)
SODIUM SERPL-SCNC: 139 MMOL/L (ref 136–145)
TROPONIN T SERPL-MCNC: <0.01 NG/ML (ref 0–0.03)
WBC # BLD AUTO: 11.29 10*3/MM3 (ref 3.4–10.8)

## 2021-09-07 PROCEDURE — 99284 EMERGENCY DEPT VISIT MOD MDM: CPT

## 2021-09-07 PROCEDURE — 25010000002 MORPHINE PER 10 MG: Performed by: FAMILY MEDICINE

## 2021-09-07 PROCEDURE — 71045 X-RAY EXAM CHEST 1 VIEW: CPT

## 2021-09-07 PROCEDURE — 85610 PROTHROMBIN TIME: CPT | Performed by: PHYSICIAN ASSISTANT

## 2021-09-07 PROCEDURE — 73552 X-RAY EXAM OF FEMUR 2/>: CPT

## 2021-09-07 PROCEDURE — 96374 THER/PROPH/DIAG INJ IV PUSH: CPT

## 2021-09-07 PROCEDURE — 85730 THROMBOPLASTIN TIME PARTIAL: CPT | Performed by: PHYSICIAN ASSISTANT

## 2021-09-07 PROCEDURE — 80053 COMPREHEN METABOLIC PANEL: CPT | Performed by: PHYSICIAN ASSISTANT

## 2021-09-07 PROCEDURE — 73590 X-RAY EXAM OF LOWER LEG: CPT

## 2021-09-07 PROCEDURE — 96372 THER/PROPH/DIAG INJ SC/IM: CPT

## 2021-09-07 PROCEDURE — 93005 ELECTROCARDIOGRAM TRACING: CPT | Performed by: PHYSICIAN ASSISTANT

## 2021-09-07 PROCEDURE — 93971 EXTREMITY STUDY: CPT

## 2021-09-07 PROCEDURE — 93010 ELECTROCARDIOGRAM REPORT: CPT | Performed by: INTERNAL MEDICINE

## 2021-09-07 PROCEDURE — 25010000002 DIPHENHYDRAMINE PER 50 MG: Performed by: PHYSICIAN ASSISTANT

## 2021-09-07 PROCEDURE — 25010000002 METHYLPREDNISOLONE PER 40 MG: Performed by: PHYSICIAN ASSISTANT

## 2021-09-07 PROCEDURE — 72170 X-RAY EXAM OF PELVIS: CPT

## 2021-09-07 PROCEDURE — 83880 ASSAY OF NATRIURETIC PEPTIDE: CPT | Performed by: PHYSICIAN ASSISTANT

## 2021-09-07 PROCEDURE — 82962 GLUCOSE BLOOD TEST: CPT

## 2021-09-07 PROCEDURE — 63710000001 ONDANSETRON ODT 4 MG TABLET DISPERSIBLE: Performed by: FAMILY MEDICINE

## 2021-09-07 PROCEDURE — 85025 COMPLETE CBC W/AUTO DIFF WBC: CPT | Performed by: PHYSICIAN ASSISTANT

## 2021-09-07 PROCEDURE — 96375 TX/PRO/DX INJ NEW DRUG ADDON: CPT

## 2021-09-07 PROCEDURE — 93971 EXTREMITY STUDY: CPT | Performed by: SURGERY

## 2021-09-07 PROCEDURE — 84484 ASSAY OF TROPONIN QUANT: CPT | Performed by: PHYSICIAN ASSISTANT

## 2021-09-07 RX ORDER — DIPHENHYDRAMINE HYDROCHLORIDE 50 MG/ML
50 INJECTION INTRAMUSCULAR; INTRAVENOUS ONCE
Status: DISCONTINUED | OUTPATIENT
Start: 2021-09-07 | End: 2021-09-08 | Stop reason: HOSPADM

## 2021-09-07 RX ORDER — DIPHENHYDRAMINE HYDROCHLORIDE 50 MG/ML
50 INJECTION INTRAMUSCULAR; INTRAVENOUS ONCE
Status: COMPLETED | OUTPATIENT
Start: 2021-09-07 | End: 2021-09-07

## 2021-09-07 RX ORDER — SODIUM CHLORIDE, SODIUM LACTATE, POTASSIUM CHLORIDE, CALCIUM CHLORIDE 600; 310; 30; 20 MG/100ML; MG/100ML; MG/100ML; MG/100ML
100 INJECTION, SOLUTION INTRAVENOUS CONTINUOUS
Status: DISCONTINUED | OUTPATIENT
Start: 2021-09-07 | End: 2021-09-08 | Stop reason: HOSPADM

## 2021-09-07 RX ORDER — METHYLPREDNISOLONE SODIUM SUCCINATE 40 MG/ML
40 INJECTION, POWDER, LYOPHILIZED, FOR SOLUTION INTRAMUSCULAR; INTRAVENOUS EVERY 4 HOURS
Status: DISCONTINUED | OUTPATIENT
Start: 2021-09-07 | End: 2021-09-08 | Stop reason: HOSPADM

## 2021-09-07 RX ORDER — ONDANSETRON 4 MG/1
4 TABLET, ORALLY DISINTEGRATING ORAL ONCE
Status: COMPLETED | OUTPATIENT
Start: 2021-09-07 | End: 2021-09-07

## 2021-09-07 RX ADMIN — MORPHINE SULFATE 4 MG: 4 INJECTION INTRAVENOUS at 20:41

## 2021-09-07 RX ADMIN — SODIUM CHLORIDE, POTASSIUM CHLORIDE, SODIUM LACTATE AND CALCIUM CHLORIDE 500 ML: 600; 310; 30; 20 INJECTION, SOLUTION INTRAVENOUS at 22:29

## 2021-09-07 RX ADMIN — ONDANSETRON 4 MG: 4 TABLET, ORALLY DISINTEGRATING ORAL at 20:40

## 2021-09-07 RX ADMIN — METHYLPREDNISOLONE SODIUM SUCCINATE 40 MG: 40 INJECTION, POWDER, FOR SOLUTION INTRAMUSCULAR; INTRAVENOUS at 22:22

## 2021-09-07 RX ADMIN — DIPHENHYDRAMINE HYDROCHLORIDE 50 MG: 50 INJECTION, SOLUTION INTRAMUSCULAR; INTRAVENOUS at 22:29

## 2021-09-07 NOTE — ED TRIAGE NOTES
PATIENT PRESENTS WITH A BURNING SENSATION TO HER RIGHT LEG SINCE LAST NIGHT. PATIENT REPORTS SHE TOOK MOTRIN LAST NIGHT TO HELP WITH THE PAIN BUT ITS NOT ANY BETTER.

## 2021-09-08 ENCOUNTER — APPOINTMENT (OUTPATIENT)
Dept: CT IMAGING | Facility: HOSPITAL | Age: 76
End: 2021-09-08

## 2021-09-08 VITALS
WEIGHT: 160 LBS | HEART RATE: 93 BPM | BODY MASS INDEX: 25.71 KG/M2 | OXYGEN SATURATION: 96 % | SYSTOLIC BLOOD PRESSURE: 165 MMHG | HEIGHT: 66 IN | DIASTOLIC BLOOD PRESSURE: 95 MMHG | RESPIRATION RATE: 18 BRPM | TEMPERATURE: 96.9 F

## 2021-09-08 LAB
QT INTERVAL: 378 MS
QTC INTERVAL: 449 MS

## 2021-09-08 PROCEDURE — 0 IOPAMIDOL PER 1 ML: Performed by: PHYSICIAN ASSISTANT

## 2021-09-08 PROCEDURE — 71275 CT ANGIOGRAPHY CHEST: CPT

## 2021-09-08 PROCEDURE — 25010000002 ENOXAPARIN PER 10 MG: Performed by: FAMILY MEDICINE

## 2021-09-08 PROCEDURE — 96372 THER/PROPH/DIAG INJ SC/IM: CPT

## 2021-09-08 RX ORDER — RIVAROXABAN 15 MG-20MG
KIT ORAL
Qty: 51 EACH | Refills: 0 | Status: SHIPPED | OUTPATIENT
Start: 2021-09-08

## 2021-09-08 RX ADMIN — IOPAMIDOL 67 ML: 755 INJECTION, SOLUTION INTRAVENOUS at 00:18

## 2021-09-08 RX ADMIN — ENOXAPARIN SODIUM 70 MG: 80 INJECTION, SOLUTION INTRAVENOUS; SUBCUTANEOUS at 01:33

## 2021-09-08 NOTE — ED PROVIDER NOTES
Subjective   History of Present Illness    Patient is a pleasant 76-year-old female presents to ED with son.  Chief complaint is right leg pain.  The patient describes as evening, she noted this burning sensation and she points to her distal femur and below her right knee.  She denies any trauma.  She had a similar presentation in the remote past and when she had taken ibuprofen, the pain subsided.  This time, she had taken ibuprofen and the pain has still persisted.  She rates her pain a 7 out of 10.  She reports is worse whenever she moves her leg.  She denies any pain in her back or her right hip.  She did have a pubic rami fracture on the LEFT side several months ago when she is in rehab.  She felt she has recovered well from this.  She denies any swelling.  She does have some right calf pain.  She denies any chest pain, pressure, tightness.  She denies any shortness of breath.  She denies any history of DVT or PE but she is concerned she might have a DVT.  She denies any pain in her ankle or foot.  She denies any trauma.  She denies any fever or skin breakdown.    Review of Systems   Constitutional: Positive for activity change.   HENT: Negative.    Eyes: Negative.    Respiratory: Negative.  Negative for chest tightness and shortness of breath.    Cardiovascular: Positive for leg swelling. Negative for chest pain.   Gastrointestinal: Negative.    Genitourinary: Negative.    Musculoskeletal: Negative.    Skin: Negative.    Neurological: Negative.    Psychiatric/Behavioral: Negative.    All other systems reviewed and are negative.      Past Medical History:   Diagnosis Date   • Dementia (CMS/HCC)    • Depression    • Disease of thyroid gland    • Parkinson disease (CMS/HCC)        Allergies   Allergen Reactions   • Iodinated Diagnostic Agents    • Sulfa Antibiotics    • Nickel Rash       Past Surgical History:   Procedure Laterality Date   • CHOLECYSTECTOMY     • CYST REMOVAL         Family History   Problem  Relation Age of Onset   • Diabetes Mother    • Diabetes Sister    • Diabetes Brother        Social History     Socioeconomic History   • Marital status:      Spouse name: Not on file   • Number of children: Not on file   • Years of education: Not on file   • Highest education level: Not on file   Tobacco Use   • Smoking status: Never Smoker   • Smokeless tobacco: Never Used   Substance and Sexual Activity   • Alcohol use: No   • Drug use: No   • Sexual activity: Defer       Prior to Admission medications    Medication Sig Start Date End Date Taking? Authorizing Provider   acetaminophen (TYLENOL) 325 MG tablet Take 2 tablets by mouth Every 4 (Four) Hours As Needed for Mild Pain . 4/29/21   Usha Ritchie APRN   carbidopa-levodopa (SINEMET)  MG per tablet Take 1 tablet by mouth 5 (Five) Times a Day.    ProviderPayton MD   ferrous sulfate (FerrouSul) 325 (65 FE) MG tablet Take 1 tablet by mouth Daily With Breakfast. 4/29/21   Usha Ritchie APRN   levothyroxine (SYNTHROID, LEVOTHROID) 75 MCG tablet Take 75 mcg by mouth Daily.    ProviderPayton MD   QUEtiapine (SEROquel) 100 MG tablet Take 100 mg by mouth Every Night.    ProviderPayton MD       Medications   methylPREDNISolone sodium succinate (SOLU-Medrol) injection 40 mg (40 mg Intravenous Given 9/7/21 2222)   diphenhydrAMINE (BENADRYL) injection 50 mg (has no administration in time range)   lactated ringers infusion (has no administration in time range)   enoxaparin (LOVENOX) syringe 70 mg (has no administration in time range)   morphine injection 4 mg (4 mg Intramuscular Given 9/7/21 2041)   ondansetron ODT (ZOFRAN-ODT) disintegrating tablet 4 mg (4 mg Oral Given 9/7/21 2040)   diphenhydrAMINE (BENADRYL) injection 50 mg (50 mg Intravenous Given 9/7/21 2229)   lactated ringers bolus 500 mL (500 mL Intravenous New Bag 9/7/21 2229)   iopamidol (ISOVUE-370) 76 % injection 100 mL (67 mL Intravenous Given 9/8/21 0018)       BP  "168/94   Pulse 100   Temp 97.9 °F (36.6 °C) (Oral)   Resp 18   Ht 167.6 cm (66\")   Wt 72.6 kg (160 lb)   SpO2 95%   BMI 25.82 kg/m²       Objective   Physical Exam  Vitals and nursing note reviewed.   Constitutional:       General: She is not in acute distress.     Appearance: She is well-developed. She is not diaphoretic.   HENT:      Head: Normocephalic and atraumatic.   Eyes:      Extraocular Movements: Extraocular movements intact.      Conjunctiva/sclera: Conjunctivae normal.   Neck:      Trachea: No tracheal deviation.   Cardiovascular:      Rate and Rhythm: Normal rate and regular rhythm.      Heart sounds: Normal heart sounds. No murmur heard.     Pulmonary:      Effort: Pulmonary effort is normal.      Breath sounds: Normal breath sounds.   Abdominal:      General: Bowel sounds are normal. There is no distension.      Palpations: Abdomen is soft. There is no mass.      Tenderness: There is no abdominal tenderness. There is no guarding or rebound.   Musculoskeletal:      Cervical back: Normal range of motion and neck supple.      Thoracic back: Normal.      Lumbar back: Normal.      Right hip: Tenderness present. Decreased range of motion.      Left hip: Normal. No tenderness.      Left upper leg: Normal.      Right knee: Decreased range of motion. Tenderness present.      Instability Tests: Anterior drawer test negative. Posterior drawer test negative. Anterior Lachman test negative. Medial Maile test negative and lateral Maile test negative.      Left knee: Normal.      Right lower leg: Tenderness present.      Left lower leg: Normal.      Right ankle: Normal.      Right Achilles Tendon: Normal.      Left ankle: Normal.      Right foot: Normal.      Left foot: Normal.        Legs:       Comments: Patient has reproducible pain to palpate on her distal femur and the anterior aspect as well as the proximal tib-fib.  When I flex her knee, she also has reproducible pain but there is no laxity.  She " is nontender to touch on her patellar tendon, medial or lateral aspect of her knee.  When I abduct her right hip, she does have reproducible pain.  She has some pain when I internally rotate her at the right hip that she is nontender to touch at the hip itself.   Skin:     General: Skin is warm and dry.      Capillary Refill: Capillary refill takes less than 2 seconds.   Neurological:      General: No focal deficit present.      Mental Status: She is alert and oriented to person, place, and time.      Deep Tendon Reflexes: Reflexes are normal and symmetric.   Psychiatric:         Mood and Affect: Mood normal.         Behavior: Behavior normal.         Thought Content: Thought content normal.         Judgment: Judgment normal.         Procedures         Lab Results (last 24 hours)     Procedure Component Value Units Date/Time    POC Glucose Once [[id number]]  (Normal) Collected: 09/07/21 2159    Specimen: Blood Updated: 09/07/21 2211     Glucose 119 mg/dL      Comment: : [id number] Nick WattLexicon Pharmaceuticalseter ID: [id number]       CBC & Differential [[id number]]  (Abnormal) Collected: 09/07/21 2220    Specimen: Blood Updated: 09/07/21 2245    Narrative:      The following orders were created for panel order CBC & Differential.  Procedure                               Abnormality         Status                     ---------                               -----------         ------                     CBC Auto Differential[[id number]]        Abnormal            Final result                 Please view results for these tests on the individual orders.    Comprehensive Metabolic Panel [[id number]]  (Abnormal) Collected: 09/07/21 2220    Specimen: Blood Updated: 09/07/21 2315     Glucose 140 mg/dL      BUN 19 mg/dL      Creatinine 0.75 mg/dL      Sodium 139 mmol/L      Potassium 4.2 mmol/L      Chloride 105 mmol/L      CO2 25.0 mmol/L      Calcium 9.1 mg/dL      Total Protein 6.8 g/dL      Albumin 4.10 g/dL      ALT (SGPT) 7  U/L      AST (SGOT) 60 U/L      Alkaline Phosphatase 219 U/L      Total Bilirubin 0.6 mg/dL      eGFR Non African Amer 75 mL/min/1.73      Globulin 2.7 gm/dL      A/G Ratio 1.5 g/dL      BUN/Creatinine Ratio 25.3     Anion Gap 9.0 mmol/L     Narrative:      GFR Normal >60  Chronic Kidney Disease <60  Kidney Failure <15      Protime-INR [[id number]]  (Normal) Collected: 09/07/21 2220    Specimen: Blood Updated: 09/07/21 2253     Protime 12.3 Seconds      INR 0.99    aPTT [[id number]]  (Normal) Collected: 09/07/21 2220    Specimen: Blood Updated: 09/07/21 2253     PTT 28.2 seconds     Troponin [[id number]]  (Normal) Collected: 09/07/21 2220    Specimen: Blood Updated: 09/07/21 2313     Troponin T <0.010 ng/mL     Narrative:      Troponin T Reference Range:  <= 0.03 ng/mL-   Negative for AMI  >0.03 ng/mL-     Abnormal for myocardial necrosis.  Clinicians would have to utilize clinical acumen, EKG, Troponin and serial changes to determine if it is an Acute Myocardial Infarction or myocardial injury due to an underlying chronic condition.       Results may be falsely decreased if patient taking Biotin.      BNP [[id number]]  (Normal) Collected: 09/07/21 2220    Specimen: Blood Updated: 09/07/21 2313     proBNP 91.9 pg/mL     Narrative:      Among patients with dyspnea, NT-proBNP is highly sensitive for the detection of acute congestive heart failure. In addition NT-proBNP of <300 pg/ml effectively rules out acute congestive heart failure with 99% negative predictive value.    Results may be falsely decreased if patient taking Biotin.      CBC Auto Differential [[id number]]  (Abnormal) Collected: 09/07/21 2220    Specimen: Blood Updated: 09/07/21 2245     WBC 11.29 10*3/mm3      RBC 4.38 10*6/mm3      Hemoglobin 12.8 g/dL      Hematocrit 40.4 %      MCV 92.2 fL      MCH 29.2 pg      MCHC 31.7 g/dL      RDW 14.6 %      RDW-SD 50.0 fl      MPV 9.9 fL      Platelets 221 10*3/mm3      Neutrophil % 64.1 %      Lymphocyte % 25.9 %       Monocyte % 8.1 %      Eosinophil % 1.3 %      Basophil % 0.3 %      Immature Grans % 0.3 %      Neutrophils, Absolute 7.25 10*3/mm3      Lymphocytes, Absolute 2.92 10*3/mm3      Monocytes, Absolute 0.91 10*3/mm3      Eosinophils, Absolute 0.15 10*3/mm3      Basophils, Absolute 0.03 10*3/mm3      Immature Grans, Absolute 0.03 10*3/mm3      nRBC 0.0 /100 WBC           XR Chest 1 View    Result Date: 9/7/2021  Narrative: EXAMINATION:  XR CHEST 1 VW-  9/7/2021 10:04 PM CDT  HISTORY: Diaphoresis.  COMPARISON: 1/1/2020.  FINDINGS:  There is hypoventilation. There is mild vascular crowding. There is stable bronchial wall thickening. There is no dense infiltrate or effusion. There is a likely chronic left clavicle fracture with either incomplete or nonbony union. There are surgical clips on the right side of the base of the neck that may be related to prior thyroid resection. There are artifacts overlying the left shoulder and left mid to lower lung field. Heart size is within normal limits.      Impression: 1. Hypoventilation with vascular crowding. 2. Stable bronchial wall thickening. No acute appearing infiltrate. 3. Other nonacute appearing findings, as discussed.   This report was finalized on 09/07/2021 22:18 by Dr. Lincoln Shaffer MD.    XR Femur 2 View Right, XR Tibia Fibula 2 View Right, XR Pelvis 1 or 2 View    Result Date: 9/7/2021  Narrative: EXAMINATION:  XR FEMUR 2 VW RIGHT-, XR TIBIA FIBULA 2 VW RIGHT-, XR PELVIS 1 OR 2 VW-  9/7/2021 8:43 PM CDT  HISTORY: Right hip pain. Pelvic pain. Right lower leg pain.  COMPARISON: Prior pelvic x-ray on 11/28/2018.  TECHNIQUE: 2 views and 4 images right femur. 2 views and 4 images right tibia and fibula. Single view pelvis.  RIGHT FEMUR: The right femur is intact without fracture. The right hip joint space appears fairly well maintained. There is degenerative osteoarthritis of the right knee.  RIGHT TIBIA FIBULA: THERE are corticated appearing bone fragments inferior to  the medial malleolus, likely old injuries. No acute tibia or fibula fracture is seen. There is degenerative osteoarthritis of the right knee.  PELVIS: No definite acute pelvic fracture is seen. There are previous fractures of the superior and inferior pubic rami on the left with some hypertrophic bone formation that involved the symphysis pubis. These fractures were acute on 4/26/2021 CT study. The hip joint spaces appear fairly well maintained. There is minimal enthesopathy of the pelvis.      Impression: 1. No acute findings right femur. 2. No acute findings right tibia and fibula. 3. Subacute-chronic fractures of the left superior and inferior pubic rami and symphysis pubis with hypertrophic changes. The patient also had sacral alar fractures on the previous CT. These fractures are not well seen on the x-ray today. There may be some sclerotic change in the sacral alar regions bilaterally. This report was finalized on 09/07/2021 21:03 by Dr. Lincoln Shaffer MD.      ED Course  ED Course as of Sep 08 0130 Tue Sep 07, 2021   2149 As I was educating the sons, the one in the room as well two others on the phone, about the patient's diagnosis, the patient started feeling nauseated, became pale and diaphoretic.  She denies having chest pain.  Were going to hook her up to the monitor, complete labs, check her glucose, and do a CTA of her chest.    [TK]   Wed Sep 08, 2021   0056 CTA chest is still pending. I have reviewed this case with Dr. Egan who will be assuming the patient's care.     [TK]      ED Course User Index  [TK] Marquis Quinn PA          [facility]    Final diagnoses:   Acute deep vein thrombosis (DVT) of calf muscle vein of right lower extremity (CMS/HCC)   Acute deep vein thrombosis (DVT) of right peroneal vein (CMS/HCC)     Took over the care of the patient awaiting CTA of the chest which was negative for PE or other pathology.  She be discharged home with a prescription for Xarelto and to  follow-up with her primary care physician.       Dieudonne Egan MD  09/08/21 [id number]

## 2021-09-08 NOTE — ED NOTES
Called and spoke with Emir (son) about discharge, states he would come pick her up     Alix Watson RN  09/08/21 0136

## 2021-10-05 ENCOUNTER — APPOINTMENT (OUTPATIENT)
Dept: CT IMAGING | Facility: HOSPITAL | Age: 76
End: 2021-10-05

## 2021-10-05 ENCOUNTER — HOSPITAL ENCOUNTER (EMERGENCY)
Facility: HOSPITAL | Age: 76
Discharge: HOME | End: 2021-10-05
Admitting: EMERGENCY MEDICINE

## 2021-10-05 ENCOUNTER — APPOINTMENT (OUTPATIENT)
Dept: GENERAL RADIOLOGY | Facility: HOSPITAL | Age: 76
End: 2021-10-05

## 2021-10-05 VITALS
DIASTOLIC BLOOD PRESSURE: 77 MMHG | TEMPERATURE: 98 F | SYSTOLIC BLOOD PRESSURE: 146 MMHG | OXYGEN SATURATION: 96 % | BODY MASS INDEX: 27.32 KG/M2 | WEIGHT: 170 LBS | HEART RATE: 89 BPM | RESPIRATION RATE: 18 BRPM | HEIGHT: 66 IN
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
## 2021-10-05 DIAGNOSIS — S01.81XA FACIAL LACERATION, INITIAL ENCOUNTER: ICD-10-CM

## 2021-10-05 PROCEDURE — 72072 X-RAY EXAM THORAC SPINE 3VWS: CPT

## 2021-10-05 PROCEDURE — 72110 X-RAY EXAM L-2 SPINE 4/>VWS: CPT

## 2021-10-05 PROCEDURE — 72125 CT NECK SPINE W/O DYE: CPT

## 2021-10-05 PROCEDURE — 99283 EMERGENCY DEPT VISIT LOW MDM: CPT

## 2021-10-05 PROCEDURE — 70450 CT HEAD/BRAIN W/O DYE: CPT

## 2021-10-05 PROCEDURE — 73523 X-RAY EXAM HIPS BI 5/> VIEWS: CPT

## 2021-10-05 RX ORDER — LIDOCAINE HYDROCHLORIDE AND EPINEPHRINE BITARTRATE 20; .01 MG/ML; MG/ML
10 INJECTION, SOLUTION SUBCUTANEOUS ONCE
Status: COMPLETED | OUTPATIENT
Start: 2021-10-05 | End: 2021-10-05

## 2021-10-05 RX ADMIN — LIDOCAINE HYDROCHLORIDE,EPINEPHRINE BITARTRATE 10 ML: 20; .01 INJECTION, SOLUTION INFILTRATION; PERINEURAL at 21:36

## 2021-10-05 NOTE — ED NOTES
Attempt to contact patient odalis Handley. No answer at this time.      Stormy Monge, RN  10/05/21 7280

## 2021-10-06 NOTE — ED PROVIDER NOTES
Subjective   Patient is a 76-year-old female with history significant for dementia, depression, thyroid disease, Parkinson's disease.  She presents to the ER via EMS secondary to a fall.  Son at bedside states his brother was at patient's home and had apparently gone outdoors to mow her yard.  He states when his brother came inside patient had obviously fallen and hit her head.  Details are limited however son believes she likely had fallen asleep in her recliner and fell out of the chair hitting her head on a portable heater that is near her chair.  No loss of consciousness. She has a facial laceration identified.  She is currently up-to-date on her Tdap.  She has no significant complaints.  She is alert and oriented to person and place.  Son states she has times of difficulty with memory he relates to her dementia and Parkinson's.  She is currently at her baseline per the son.          Review of Systems   Constitutional: Negative.  Negative for fever.   HENT: Negative.  Negative for congestion.    Eyes: Negative.    Respiratory: Negative.  Negative for cough and shortness of breath.    Cardiovascular: Negative.  Negative for chest pain.   Gastrointestinal: Negative.  Negative for abdominal pain, diarrhea, nausea and vomiting.   Genitourinary: Negative.    Musculoskeletal: Positive for back pain and neck pain.   Skin: Positive for wound.   All other systems reviewed and are negative.      Past Medical History:   Diagnosis Date   • Dementia (HCC)    • Depression    • Disease of thyroid gland    • Parkinson disease (HCC)        Allergies   Allergen Reactions   • Iodinated Diagnostic Agents    • Sulfa Antibiotics    • Nickel Rash       Past Surgical History:   Procedure Laterality Date   • CHOLECYSTECTOMY     • CYST REMOVAL         Family History   Problem Relation Age of Onset   • Diabetes Mother    • Diabetes Sister    • Diabetes Brother        Social History     Socioeconomic History   • Marital status:       Spouse name: Not on file   • Number of children: Not on file   • Years of education: Not on file   • Highest education level: Not on file   Tobacco Use   • Smoking status: Never Smoker   • Smokeless tobacco: Never Used   Substance and Sexual Activity   • Alcohol use: No   • Drug use: No   • Sexual activity: Defer           Objective   Physical Exam  Vitals and nursing note reviewed.   Constitutional:       Appearance: She is well-developed.   HENT:      Head: Normocephalic and atraumatic.      Right Ear: External ear normal.      Left Ear: External ear normal.      Nose: Nose normal.      Mouth/Throat:      Mouth: Mucous membranes are moist.      Pharynx: Oropharynx is clear.   Eyes:      Extraocular Movements: Extraocular movements intact.      Conjunctiva/sclera: Conjunctivae normal.      Pupils: Pupils are equal, round, and reactive to light.   Neck:      Comments: Patient has cervical collar intact  Cardiovascular:      Rate and Rhythm: Normal rate and regular rhythm.      Heart sounds: Normal heart sounds.   Pulmonary:      Effort: Pulmonary effort is normal.      Breath sounds: Normal breath sounds.   Abdominal:      General: Bowel sounds are normal.      Palpations: Abdomen is soft.   Musculoskeletal:         General: Tenderness present.      Cervical back: Neck supple.      Comments: Patient appears to have pain to palpation of the lumbar spine without step-off or vertebral point tenderness identified, she has difficulty with straightening her right leg and seems to have pain to the low low back with straightening the right leg, no obvious pain to palpation to the right or left hip to palpation, neurovascular intact, sensory intact   Skin:     General: Skin is warm and dry.      Capillary Refill: Capillary refill takes less than 2 seconds.   Neurological:      General: No focal deficit present.      Mental Status: She is alert. Mental status is at baseline.   Psychiatric:         Behavior: Behavior normal.          Laceration Repair    Date/Time: 10/5/2021 9:45 PM  Performed by: Yesenia Ritchie APRN  Authorized by: Yesenia Ritchie APRN     Consent:     Consent obtained:  Verbal    Consent given by:  Patient    Risks discussed:  Infection and pain  Anesthesia (see MAR for exact dosages):     Anesthesia method:  Local infiltration    Local anesthetic:  Lidocaine 2% WITH epi  Laceration details:     Location:  Face    Facial location: left temple region.    Length (cm):  1.5  Repair type:     Repair type:  Simple  Pre-procedure details:     Preparation:  Patient was prepped and draped in usual sterile fashion  Exploration:     Wound extent: no foreign bodies/material noted      Contaminated: no    Treatment:     Area cleansed with:  Hibiclens and saline    Amount of cleaning:  Standard    Irrigation method:  Tap    Visualized foreign bodies/material removed: no    Skin repair:     Repair method:  Sutures    Suture size:  6-0    Suture material:  Nylon    Suture technique:  Simple interrupted    Number of sutures:  4  Approximation:     Approximation:  Close  Post-procedure details:     Dressing:  Antibiotic ointment and adhesive bandage    Patient tolerance of procedure:  Tolerated well, no immediate complications               ED Course  ED Course as of Oct 05 2252   Tue Oct 05, 2021   2143 X-rays were all negative for acute fractures.  CT scan of the head and cervical spine were both negative for acute fractures.    [TW]   2143 IMPRESSION:  1. Degenerative changes without evidence of acute osseous injury in the  cervical spine.       [TW]   2143 IMPRESSION:  Mild cerebral and cerebellar volume loss with chronic microvascular  disease but no evidence of acute intracranial process.  2. Trace amount of fluid is present right sphenoid sinus    [TW]   2144 IMPRESSION:  1. Mild degenerative change in the thoracic spine with no acute  compression deformity..       [TW]   2144 IMPRESSION:  1. Mild degenerative change  with no acute osseous abnormality.    [TW]   2144    IMPRESSION:  1. Unremarkable radiographs of the pelvis and hips.    [TW]      ED Course User Index  [TW] Yesenia Ritchie, SIRENA                                           MDM  Number of Diagnoses or Management Options  Facial laceration, initial encounter: new and requires workup  Fall, initial encounter: new and requires workup     Amount and/or Complexity of Data Reviewed  Tests in the radiology section of CPT®: ordered and reviewed  Discuss the patient with other providers: yes    Risk of Complications, Morbidity, and/or Mortality  Presenting problems: moderate  Diagnostic procedures: moderate  Management options: moderate    Patient Progress  Patient progress: improved      Final diagnoses:   Fall, initial encounter   Facial laceration, initial encounter       ED Disposition  ED Disposition     ED Disposition Condition Comment    Discharge Good           No follow-up provider specified.       Medication List      No changes were made to your prescriptions during this visit.          Yesenia Ritchie, APRN  10/05/21 6570

## 2021-10-06 NOTE — DISCHARGE INSTRUCTIONS
Apply thin layer of bacitracin twice daily; avoid any peroxide or alcohol to the area; f/u in 5-7 days for suture removal  Maintain fall precautions  Return with any worsening sxs

## 2021-10-11 RX ORDER — QUETIAPINE FUMARATE 25 MG/1
TABLET, FILM COATED ORAL
Qty: 60 TABLET | Refills: 2 | Status: SHIPPED | OUTPATIENT
Start: 2021-10-11 | End: 2021-10-20

## 2021-10-11 NOTE — TELEPHONE ENCOUNTER
Eboni Natasha has requested a refill on her medication.       Last office visit : 8/16/2021   Next office visit : 10/20/2021   Last medication refill :4/14/2021      Requested Prescriptions     Pending Prescriptions Disp Refills    QUEtiapine (SEROQUEL) 25 MG tablet [Pharmacy Med Name: QUETIAPINE 25MG TABLETS] 60 tablet 2     Sig: TAKE 1 TABLET BY MOUTH IN THE MORNING

## 2021-10-20 ENCOUNTER — OFFICE VISIT (OUTPATIENT)
Dept: NEUROSURGERY | Age: 76
End: 2021-10-20
Payer: MEDICARE

## 2021-10-20 VITALS
DIASTOLIC BLOOD PRESSURE: 64 MMHG | TEMPERATURE: 96.9 F | OXYGEN SATURATION: 100 % | BODY MASS INDEX: 20.09 KG/M2 | WEIGHT: 125 LBS | SYSTOLIC BLOOD PRESSURE: 119 MMHG | HEART RATE: 73 BPM | HEIGHT: 66 IN

## 2021-10-20 DIAGNOSIS — R44.3 HALLUCINATIONS: ICD-10-CM

## 2021-10-20 DIAGNOSIS — G20 PARKINSON DISEASE (HCC): Primary | ICD-10-CM

## 2021-10-20 PROCEDURE — 99213 OFFICE O/P EST LOW 20 MIN: CPT | Performed by: NURSE PRACTITIONER

## 2021-10-20 RX ORDER — RIVAROXABAN 20 MG/1
20 TABLET, FILM COATED ORAL
COMMUNITY
Start: 2021-10-13 | End: 2022-06-22

## 2021-10-20 NOTE — PROGRESS NOTES
[id number] [facility] Neurology Office Note      Patient:   Kyleigh Castellanos  MR#:    [id number]  Account Number:                         YOB: 1945  Date of Evaluation:  10/20/2021  Time of Note:                          10:44 AM  Primary/Referring Physician:  Gema Cantor MD   Consulting Physician:  Pura La DNP, APRN     FOLLOW UP    Chief Complaint   Patient presents with    Follow-up     pt family states pt has had one fall since last visit, things are about the same as last visit    Kenan Vang is a 64y.o. year old female here follow up of Parkinson's, memory loss. She is accompanied by her son today. She did have a fall recently, required sutures near her left eye. Family had requested to re-try Nuplazid at last visit but noted worsening confusion with this so now back on Seroquel 100mg nightly. Taking Sinemet 25/100 four times daily. On Xarelto now for blood clots in her leg. Parkinson's largely unchanged, dyskinesias improved after adjusting Sinemet dose. Tremor will wax and wane. No gait changes. Intermittent bradykinesia noted. Still noting hallucinations, both auditory and visual hallucinations. Memory loss is unchanged. Primarily short term in nature. Does not interfere with ADLs. Using a [employer] pill organizer for her medications. She does not drive. Has a personal care aide now that is coming twice a day. Her sons are involved in her care, check on her daily. She was on Neurontin for RLS but family felt this made hallucinations worse. RLS symptoms unchanged. She was diagnosed with PD in 2000 at [facility], followed by Dr. Darinel Patel before as well. Has tried and failed Requip, Sinemet CR and Xadago. Past Medical History:   Diagnosis Date    Hyperthyroidism     Parkinson disease ([facility] [address]        Past Surgical History:   Procedure Laterality Date    BRAIN SURGERY      CHOLECYSTECTOMY      THYROIDECTOMY, PARTIAL         History reviewed.  No pertinent family history. Social History     Socioeconomic History    Marital status:      Spouse name: Not on file    Number of children: Not on file    Years of education: Not on file    Highest education level: Not on file   Occupational History    Not on file   Tobacco Use    Smoking status: Never Smoker    Smokeless tobacco: Never Used   Vaping Use    Vaping Use: Never used   Substance and Sexual Activity    Alcohol use: No    Drug use: No    Sexual activity: Not Currently     Partners: Male   Other Topics Concern    Not on file   Social History Narrative    Not on file     Social Determinants of Health     Financial Resource Strain:     Difficulty of Paying Living Expenses:    Food Insecurity:     Worried About Running Out of Food in the Last Year:     [address] in the Last Year:    Transportation Needs:     Lack of Transportation (Medical):      Lack of Transportation (Non-Medical):    Physical Activity:     Days of Exercise per Week:     Minutes of Exercise per Session:    Stress:     Feeling of Stress :    Social Connections:     Frequency of Communication with Friends and Family:     Frequency of Social Gatherings with Friends and Family:     Attends Sikhism Services:     Active Member of Clubs or Organizations:     Attends Club or Organization Meetings:     Marital Status:    Intimate Partner Violence:     Fear of Current or Ex-Partner:     Emotionally Abused:     Physically Abused:     Sexually Abused:        Current Outpatient Medications   Medication Sig Dispense Refill    XARELTO 20 MG TABS tablet Take 20 mg by mouth      carbidopa-levodopa (SINEMET)  MG per tablet TAKE 1 TABLET BY MOUTH FOUR TIMES DAILY 150 tablet 5    QUEtiapine (SEROQUEL) 100 MG tablet TAKE 1 TABLET BY MOUTH EVERY EVENING 90 tablet 1    Turmeric (QC TUMERIC COMPLEX) 500 MG CAPS Take 1,000 mg by mouth 2 times daily      loratadine (CLARITIN) 10 MG capsule Take 10 mg by mouth daily      Multiple Vitamin (MULTI VITAMIN DAILY PO) Take by mouth      Ascorbic Acid (SHANNA-C PO) Take by mouth      Cyanocobalamin (VITAMIN B 12 PO) Take by mouth      Magnesium 100 MG CAPS Take by mouth Not sure of the mg      calcium carbonate (OSCAL) 500 MG TABS tablet Take 500 mg by mouth daily Not sure of the mg      levothyroxine (SYNTHROID) 75 MCG tablet Take 75 mcg by mouth daily        No current facility-administered medications for this visit. Allergies   Allergen Reactions    Contrast [Iodides]     Sulfa Antibiotics     Nickel Rash     REVIEW OF SYSTEMS  Constitutional: []? Fever []? Sweats []? Chills []? Recent Injury [x]? Denies all unless marked  HEENT:[]? Headache  []? Head Injury []? Hearing Loss  []? Sore Throat  []? Ear Ache [x]? Denies all unless marked  Spine:  []? Neck pain  []? Back pain  []? Sciaticia  [x]? Denies all unless marked  Cardiovascular:[]? Heart Disease []? Palpitations []? Chest Pain   [x]? Denies all unless marked  Pulmonary: []? Shortness of Breath []? Cough   [x]? Denies all unless marked  Psychiatric/Behavioral:[]? Depression []? Anxiety [x]? Denies all unless marked  Gastrointestinal: []? Nausea  []? Vomiting  []? Abdominal Pain  []? Constipation  []? Diarrhea  [x]? Denies all unless marked  Genitourinary:   []? Frequency  []? Urgency  []? Dysuria []? Incontinence  [x]? Denies all unless marked  Extremities: []? Pain  []? Swelling  [x]? Denies all unless marked  Musculoskeletal: []? Myalgias  []? Joint Pain  []? Arthritis []? Muscle Cramps []? Muscle Twitches  [x]? Denies all unless marked  Sleep: []? Insomnia[]? Snoring []? Restless Legs  []? Sleep Apnea  []? Daytime Sleepiness  [x]? Denies all unless marked  Skin:[]? Rash []? Color Change [x]? Denies all unless marked   Neurological:[]? Visual Disturbance []? Memory Loss []? Loss of Balance []? Slurred Speech []? Weakness []? Seizures  []? Dizziness [x]? Denies all unless marked  The MA has completed the ROS with the patient.  I have reviewed it in its' entirety with the patient and agree with the documentation. PHYSICAL EXAM    Constitutional -   /64   Pulse 73   Temp 96.9 °F (36.1 °C)   Ht 5' 6\" (1.676 m)   Wt 125 lb (56.7 kg)   SpO2 100%   BMI 20.18 kg/m²    General appearance: No acute distress   EYES -   Conjunctiva normal  Pupillary exam as below, see CN exam in the neurologic exam  ENT-    No scars, masses, or lesions over external nose or ears  Hearing normal bilaterally to finger rub  Cardiovascular -   No clubbing, cyanosis, or edema   Pulmonary-   Good expansion, normal effort without use of accessory muscles  Musculoskeletal -   No significant wasting of muscles noted  Gait as below, see gait exam in the neurologic exam  Muscle strength, tone, stability as below. No bony deformities  Skin -   Warm, dry, and intact to inspection and palpation. No rash, erythema, or pallor  Psychiatric -   Mood, affect, and behavior appear normal    Memory as below see mental status examination in the neurologic exam    NEUROLOGICAL EXAM    Mental status   [x]Awake, alert, oriented   [x]Affect attention and concentration appear appropriate  []Recent and remote memory appears unremarkable  []Speech normal without dysarthria or aphasia, comprehension and repetition intact. COMMENTS: intermittent dysarthria noted    Cranial Nerves [x]No VF deficit to confrontation,  no papilledema on fundoscopic exam.  [x]PERRLA, EOMI, no nystagmus, conjugate eye movements, no ptosis  [x]Face symmetric  [x]Facial sensation intact  [x]Tongue midline no atrophy or fasciculations present  [x]Palate midline, hearing to finger rub normal bilaterally  [x]Shoulder shrug and SCM testing normal bilaterally  COMMENTS:   Motor   []5/5 strength x 4 extremities  []Normal bulk and tone  []No tremor present  []No rigidity or bradykinesia noted  COMMENTS: mild resting tremor bilaterally, mild bradykinesia, no rigidity.  Mild dyskinesias- improved    Sensory at home, fall precautions, no driving, medication monitoring   4. Continue to follow memory clinically, add memory agent with any progression.    5. Follow up in 3 months, sooner with any worsening      Twin Wiley DNP, APRN

## 2022-01-04 RX ORDER — QUETIAPINE FUMARATE 100 MG/1
100 TABLET, FILM COATED ORAL EVERY EVENING
Qty: 90 TABLET | Refills: 1 | Status: SHIPPED | OUTPATIENT
Start: 2022-01-04 | End: 2022-01-19 | Stop reason: SDUPTHER

## 2022-01-04 NOTE — TELEPHONE ENCOUNTER
Celiarashel Favre has requested a refill on her medication.       Last office visit : 10/20/2021   Next office visit : 1/19/2022   Last medication refill :7/8/2021 w/1rf      Requested Prescriptions     Pending Prescriptions Disp Refills    QUEtiapine (SEROQUEL) 100 MG tablet [Pharmacy Med Name: QUETIAPINE 100MG TABLETS] 90 tablet 1     Sig: TAKE 1 TABLET BY MOUTH EVERY EVENING

## 2022-01-19 ENCOUNTER — OFFICE VISIT (OUTPATIENT)
Dept: NEUROSURGERY | Age: 77
End: 2022-01-19
Payer: MEDICARE

## 2022-01-19 VITALS
HEIGHT: 66 IN | BODY MASS INDEX: 20.09 KG/M2 | OXYGEN SATURATION: 97 % | TEMPERATURE: 97.1 F | HEART RATE: 70 BPM | WEIGHT: 125 LBS | SYSTOLIC BLOOD PRESSURE: 135 MMHG | DIASTOLIC BLOOD PRESSURE: 70 MMHG

## 2022-01-19 DIAGNOSIS — G20 PARKINSON DISEASE (HCC): Primary | ICD-10-CM

## 2022-01-19 DIAGNOSIS — R41.3 MEMORY LOSS: ICD-10-CM

## 2022-01-19 DIAGNOSIS — R44.3 HALLUCINATIONS: ICD-10-CM

## 2022-01-19 PROCEDURE — 99213 OFFICE O/P EST LOW 20 MIN: CPT | Performed by: NURSE PRACTITIONER

## 2022-01-19 RX ORDER — QUETIAPINE FUMARATE 100 MG/1
100 TABLET, FILM COATED ORAL EVERY EVENING
Qty: 90 TABLET | Refills: 1 | Status: SHIPPED | OUTPATIENT
Start: 2022-01-19 | End: 2022-05-18 | Stop reason: ALTCHOICE

## 2022-01-19 NOTE — PROGRESS NOTES
[id number] [facility] Neurology Office Note      Patient:   Ghada Sheehan  MR#:    [id number]  Account Number:                         YOB: 1945  Date of Evaluation:  1/19/2022  Time of Note:                          3:18 PM  Primary/Referring Physician:  Chan Nieto MD   Consulting Physician:  Ann Marie Deutsch DNP, APRN     FOLLOW UP    Chief Complaint   Patient presents with    3 Month Follow-Up     pt states walking is much better    Tremors       HISTORY OF PRESENT ILLNESS    Ghada Sheehan is a 64y.o. year old female here follow up of Parkinson's, memory loss. She is accompanied by her son today. Unchanged from prior visit. Denies further falls. Tremor will wax and wane. No gait changes. Intermittent bradykinesia noted. On Sinemet 25/100 QID, previously had dyskinesias on higher doses. Still noting hallucinations, both auditory and visual hallucinations. Memory loss is unchanged. Primarily short term in nature. Does not interfere with ADLs. Using a [employer] pill organizer for her medications. She does not drive. Has a personal care aide now that is coming twice a day. Her sons are involved in her care, check on her daily. On Seroquel 100mg nightly. Failed Nuplazid previously. Had falls on higher doses of Seroquel. On Xarelto now for blood clots in her leg. She was on Neurontin for RLS but family felt this made hallucinations worse. RLS symptoms unchanged. She was diagnosed with PD in 2000 at [facility], followed by Dr. Sonali Pulido before as well. Has tried and failed Requip, Sinemet CR and Xadago. Past Medical History:   Diagnosis Date    Hyperthyroidism     Parkinson disease ([facility] [address]        Past Surgical History:   Procedure Laterality Date    BRAIN SURGERY      CHOLECYSTECTOMY      THYROIDECTOMY, PARTIAL         History reviewed. No pertinent family history. Social History     Socioeconomic History    Marital status:       Spouse name: Not on file    Number of children: Not on file    Years of education: Not on file    Highest education level: Not on file   Occupational History    Not on file   Tobacco Use    Smoking status: Never Smoker    Smokeless tobacco: Never Used   Vaping Use    Vaping Use: Never used   Substance and Sexual Activity    Alcohol use: No    Drug use: No    Sexual activity: Not Currently     Partners: Male   Other Topics Concern    Not on file   Social History Narrative    Not on file     Social Determinants of Health     Financial Resource Strain:     Difficulty of Paying Living Expenses: Not on file   Food Insecurity:     Worried About Running Out of Food in the Last Year: Not on file    Nathan of Food in the Last Year: Not on file   Transportation Needs:     Lack of Transportation (Medical): Not on file    Lack of Transportation (Non-Medical):  Not on file   Physical Activity:     Days of Exercise per Week: Not on file    Minutes of Exercise per Session: Not on file   Stress:     Feeling of Stress : Not on file   Social Connections:     Frequency of Communication with Friends and Family: Not on file    Frequency of Social Gatherings with Friends and Family: Not on file    Attends Advent Services: Not on file    Active Member of [address] [place] [address] or Organizations: Not on file    Attends Club or Organization Meetings: Not on file    Marital Status: Not on file   Intimate Partner Violence:     Fear of Current or Ex-Partner: Not on file    Emotionally Abused: Not on file    Physically Abused: Not on file    Sexually Abused: Not on file   Housing Stability:     Unable to Pay for Housing in the Last Year: Not on file    Number of [place] in the Last Year: Not on file    Unstable Housing in the Last Year: Not on file       Current Outpatient Medications   Medication Sig Dispense Refill    carbidopa-levodopa (SINEMET)  MG per tablet TAKE 1 TABLET BY MOUTH FOUR TIMES DAILY 150 tablet 5    QUEtiapine (SEROQUEL) 100 MG tablet Take 1 tablet by mouth every evening 90 tablet 1    XARELTO 20 MG TABS tablet Take 20 mg by mouth      Turmeric (QC TUMERIC COMPLEX) 500 MG CAPS Take 1,000 mg by mouth 2 times daily      loratadine (CLARITIN) 10 MG capsule Take 10 mg by mouth daily      Multiple Vitamin (MULTI VITAMIN DAILY PO) Take by mouth      Ascorbic Acid (SHANNA-C PO) Take by mouth      Cyanocobalamin (VITAMIN B 12 PO) Take by mouth      Magnesium 100 MG CAPS Take by mouth Not sure of the mg      calcium carbonate (OSCAL) 500 MG TABS tablet Take 500 mg by mouth daily Not sure of the mg      levothyroxine (SYNTHROID) 75 MCG tablet Take 75 mcg by mouth daily        No current facility-administered medications for this visit. Allergies   Allergen Reactions    Contrast [Iodides]     Sulfa Antibiotics     Nickel Rash     REVIEW OF SYSTEMS  Constitutional: []? Fever []? Sweats []? Chills []? Recent Injury [x]? Denies all unless marked  HEENT:[]? Headache  []? Head Injury []? Hearing Loss  []? Sore Throat  []? Ear Ache [x]? Denies all unless marked  Spine:  []? Neck pain  []? Back pain  []? Sciaticia  [x]? Denies all unless marked  Cardiovascular:[]? Heart Disease []? Palpitations []? Chest Pain   [x]? Denies all unless marked  Pulmonary: []? Shortness of Breath []? Cough   [x]? Denies all unless marked  Psychiatric/Behavioral:[]? Depression []? Anxiety [x]? Denies all unless marked  Gastrointestinal: []? Nausea  []? Vomiting  []? Abdominal Pain  []? Constipation  []? Diarrhea  [x]? Denies all unless marked  Genitourinary:   []? Frequency  []? Urgency  []? Dysuria []? Incontinence  [x]? Denies all unless marked  Extremities: []? Pain  []? Swelling  [x]? Denies all unless marked  Musculoskeletal: []? Myalgias  []? Joint Pain  []? Arthritis []? Muscle Cramps []? Muscle Twitches  [x]? Denies all unless marked  Sleep: []? Insomnia[]? Snoring []? Restless Legs  []? Sleep Apnea  []? Daytime Sleepiness  [x]? Denies all unless marked  Skin:[]? Rash []? Color Change [x]?  Denies all unless marked   Neurological:[]? Visual Disturbance []? Memory Loss []? Loss of Balance []? Slurred Speech []? Weakness []? Seizures  []? Dizziness [x]? Denies all unless marked    The MA has completed the ROS with the patient. I have reviewed it in its' entirety with the patient and agree with the documentation. PHYSICAL EXAM    Constitutional -   /70   Pulse 70   Temp 97.1 °F (36.2 °C)   Ht 5' 6\" (1.676 m)   Wt 125 lb (56.7 kg)   SpO2 97%   BMI 20.18 kg/m²    General appearance: No acute distress   EYES -   Conjunctiva normal  Pupillary exam as below, see CN exam in the neurologic exam  ENT-    No scars, masses, or lesions over external nose or ears  Hearing normal bilaterally to finger rub  Cardiovascular -   No clubbing, cyanosis, or edema   Pulmonary-   Good expansion, normal effort without use of accessory muscles  Musculoskeletal -   No significant wasting of muscles noted  Gait as below, see gait exam in the neurologic exam  Muscle strength, tone, stability as below. No bony deformities  Skin -   Warm, dry, and intact to inspection and palpation. No rash, erythema, or pallor  Psychiatric -   Mood, affect, and behavior appear normal    Memory as below see mental status examination in the neurologic exam    NEUROLOGICAL EXAM    Mental status   [x]Awake, alert, oriented   [x]Affect attention and concentration appear appropriate  []Recent and remote memory appears unremarkable  []Speech normal without dysarthria or aphasia, comprehension and repetition intact.    COMMENTS: intermittent dysarthria noted    Cranial Nerves [x]No VF deficit to confrontation,  no papilledema on fundoscopic exam.  [x]PERRLA, EOMI, no nystagmus, conjugate eye movements, no ptosis  [x]Face symmetric  [x]Facial sensation intact  [x]Tongue midline no atrophy or fasciculations present  [x]Palate midline, hearing to finger rub normal bilaterally  [x]Shoulder shrug and SCM testing normal bilaterally  COMMENTS:   Motor   []5/5 strength x 4 extremities  []Normal bulk and tone  []No tremor present  []No rigidity or bradykinesia noted  COMMENTS: mild resting tremor bilaterally, mild bradykinesia, no rigidity. Mild dyskinesias- improved    Sensory  []Sensation intact to light touch, pin prick, vibration, and proprioception BLE  []Sensation intact to light touch, pin prick, vibration, and proprioception BUE  COMMENTS: Decreased LT, PP, Vib BLE   Coordination [x]FTN normal bilaterally   []HTS normal bilaterally  []TAL normal bilaterally. COMMENTS:   Reflexes  [x]Symmetric and non-pathological  [x]Toes down going bilaterally  [x]No clonus present  COMMENTS:   Gait                  []Normal steady gait    []Ataxic    []Spastic     []Magnetic     [x]Shuffling  COMMENTS: unsteady        LABS RECORD AND IMAGING REVIEW (As below and per HPI)  Reviewed prior records     ASSESSMENT:    Xin Tam is a 64y.o. year old female here for follow up of Parkinson's disease. Unchanged from prior visit. Taking 1 tablet Sinemet four times daily which controls tremor, rigidity, bradykinesia fairly well. Has had dyskinesias on higher doses previously. Continues to note hallucinations, seem to wax and wane. On Seroquel, has failed Nuplazid previously. She has been on Sinemet CR previously but noted dyskinesias while on this. She is not been able to tolerate dopamine agonists in the past (Requip). She is also tried MAO inhibitors as well Claridge Hallie). Given hallucinations as well would not re try dopamaine agonists. Has been unable to tolerate higher doses of Seroquel previously. Memory is unchanged, adding memory agent felt low yield today but will need to monitor. She has a personal care aide coming to her home twice daily and family is quite involved in care as well. Previously followed at [address] [place] [address] and by Dr. Charlotte Barksdale, diagnosed about 20 years ago. Diagnosis Orders   1. Parkinson disease ([facility] [address]     2. Hallucinations     3. Memory loss          PLAN:  1. Continue Sinemet 25/100 QID, further titration as needed but need to watch for dyskinesias   2. Continue Seroquel 100mg nightly. Discussed side effects including black box warning   3. Increase supervision at home, fall precautions, no driving, medication monitoring   4. Continue to follow memory clinically, add memory agent with any progression.    5. Follow up in 4 months, sooner with any worsening      Dayan Shae FABIAN, APRN

## 2022-01-21 NOTE — TELEPHONE ENCOUNTER
Colten Friend has requested a refill on her medication. Last office visit : 1/19/2022   Next office visit : 5/18/2022   Last medication refill :Just e-scribed to pharmacy on 1/19/2022 with 5 additional refills.     Lamona Glaze :       Requested Prescriptions     Pending Prescriptions Disp Refills    carbidopa-levodopa (SINEMET)  MG per tablet [Pharmacy Med Name: CARBIDOPA/LEVODOPA 25-100MG TABS] 150 tablet 5     Sig: TAKE 1 TABLET BY MOUTH FIVE TIMES DAILY

## 2022-05-18 ENCOUNTER — OFFICE VISIT (OUTPATIENT)
Dept: NEUROSURGERY | Age: 77
End: 2022-05-18
Payer: MEDICARE

## 2022-05-18 VITALS
HEART RATE: 73 BPM | WEIGHT: 125 LBS | HEIGHT: 66 IN | BODY MASS INDEX: 20.09 KG/M2 | SYSTOLIC BLOOD PRESSURE: 144 MMHG | OXYGEN SATURATION: 97 % | TEMPERATURE: 97.3 F | DIASTOLIC BLOOD PRESSURE: 71 MMHG

## 2022-05-18 DIAGNOSIS — R44.3 HALLUCINATIONS: ICD-10-CM

## 2022-05-18 DIAGNOSIS — G20 PARKINSON DISEASE (HCC): Primary | ICD-10-CM

## 2022-05-18 DIAGNOSIS — R41.3 MEMORY LOSS: ICD-10-CM

## 2022-05-18 LAB — SARS-COV-2, PCR: NOT DETECTED

## 2022-05-18 PROCEDURE — 4040F PNEUMOC VAC/ADMIN/RCVD: CPT | Performed by: NURSE PRACTITIONER

## 2022-05-18 PROCEDURE — 1090F PRES/ABSN URINE INCON ASSESS: CPT | Performed by: NURSE PRACTITIONER

## 2022-05-18 PROCEDURE — 99213 OFFICE O/P EST LOW 20 MIN: CPT | Performed by: NURSE PRACTITIONER

## 2022-05-18 PROCEDURE — 81003 URINALYSIS AUTO W/O SCOPE: CPT | Performed by: NURSE PRACTITIONER

## 2022-05-18 PROCEDURE — G8400 PT W/DXA NO RESULTS DOC: HCPCS | Performed by: NURSE PRACTITIONER

## 2022-05-18 PROCEDURE — 1123F ACP DISCUSS/DSCN MKR DOCD: CPT | Performed by: NURSE PRACTITIONER

## 2022-05-18 PROCEDURE — 1036F TOBACCO NON-USER: CPT | Performed by: NURSE PRACTITIONER

## 2022-05-18 PROCEDURE — G8427 DOCREV CUR MEDS BY ELIG CLIN: HCPCS | Performed by: NURSE PRACTITIONER

## 2022-05-18 PROCEDURE — G8420 CALC BMI NORM PARAMETERS: HCPCS | Performed by: NURSE PRACTITIONER

## 2022-05-18 RX ORDER — RISPERIDONE 0.25 MG/1
0.25 TABLET, FILM COATED ORAL NIGHTLY
Qty: 90 TABLET | Refills: 1 | Status: SHIPPED | OUTPATIENT
Start: 2022-05-18 | End: 2022-06-13 | Stop reason: SINTOL

## 2022-05-18 NOTE — PROGRESS NOTES
u  [facility] Neurology Office Note      Patient:   Gladis Barrera  MR#:    [id number]  Account Number:                         YOB: 1945  Date of Evaluation:  5/18/2022  Time of Note:                          4:10 PM  Primary/Referring Physician:  Tatianna Willis MD   Consulting Physician:  Diaz Ewing DNP, APRN     FOLLOW UP    Chief Complaint   Patient presents with    Follow-up     c/o cough and left hand numbness    Tremors    Cough       HISTORY OF PRESENT ILLNESS    Gladis Barrera is a 76y.o. year old female here follow up of Parkinson's, memory loss, hallucinations. She is accompanied by her sons today. They have noted increase in hallucinations, visual and auditory. she is interacting with the hallucinations. Some more irritability as well. She is sleeping well at night. Memory loss progressive. Primarily short term in nature. Seems more confused in the evening. She does not drive. Has a personal care aide now that is coming twice a day. Needing assistance with dressing now. She is using a [employer] pill organizer for medications. She is in her own home. Sons check on her daily. They have cameras set up in the home that they monitor as well. She is on Seroquel 100mg nightly. Had issues with daytime dosing previously. Failed Nuplazid. Parkinsonisms is unchanged from prior. Tremor will wax and wane. Some shuffling noted. Intermittent bradykinesia and rigidity noted. Taking Sinemet 25/100 five times daily. No clear wearing off. No dyskinesias, has had dyskinesias at higher doses previously. On Xarelto now for blood clots in her leg. She was on Neurontin for RLS but family felt this made hallucinations worse. RLS symptoms unchanged. She was diagnosed with PD in 2000 at [facility], followed by Dr. Lakisha Fuentes before as well. Has tried and failed Requip, Sinemet CR and Xadago.      Past Medical History:   Diagnosis Date    Hyperthyroidism     Parkinson disease [facility])        Past Surgical History: Procedure Laterality Date    BRAIN SURGERY      CHOLECYSTECTOMY      THYROIDECTOMY, PARTIAL         History reviewed. No pertinent family history. Social History     Socioeconomic History    Marital status:      Spouse name: Not on file    Number of children: Not on file    Years of education: Not on file    Highest education level: Not on file   Occupational History    Not on file   Tobacco Use    Smoking status: Never Smoker    Smokeless tobacco: Never Used   Vaping Use    Vaping Use: Never used   Substance and Sexual Activity    Alcohol use: No    Drug use: No    Sexual activity: Not Currently     Partners: Male   Other Topics Concern    Not on file   Social History Narrative    Not on file     Social Determinants of Health     Financial Resource Strain:     Difficulty of Paying Living Expenses: Not on file   Food Insecurity:     Worried About Running Out of Food in the Last Year: Not on file    Nathan of Food in the Last Year: Not on file   Transportation Needs:     Lack of Transportation (Medical): Not on file    Lack of Transportation (Non-Medical):  Not on file   Physical Activity:     Days of Exercise per Week: Not on file    Minutes of Exercise per Session: Not on file   Stress:     Feeling of Stress : Not on file   Social Connections:     Frequency of Communication with Friends and Family: Not on file    Frequency of Social Gatherings with Friends and Family: Not on file    Attends Denominational Services: Not on file    Active Member of Clubs or Organizations: Not on file    Attends Club or Organization Meetings: Not on file    Marital Status: Not on file   Intimate Partner Violence:     Fear of Current or Ex-Partner: Not on file    Emotionally Abused: Not on file    Physically Abused: Not on file    Sexually Abused: Not on file   Housing Stability:     Unable to Pay for Housing in the Last Year: Not on file    Number of [place] in the Last Year: Not on file  Unstable Housing in the Last Year: Not on file       Current Outpatient Medications   Medication Sig Dispense Refill    risperiDONE (RISPERDAL) 0.25 MG tablet Take 1 tablet by mouth at bedtime 90 tablet 1    carbidopa-levodopa (SINEMET)  MG per tablet TAKE 1 TABLET BY MOUTH FOUR TIMES DAILY 150 tablet 5    XARELTO 20 MG TABS tablet Take 20 mg by mouth      Turmeric (QC TUMERIC COMPLEX) 500 MG CAPS Take 1,000 mg by mouth 2 times daily      loratadine (CLARITIN) 10 MG capsule Take 10 mg by mouth daily      Multiple Vitamin (MULTI VITAMIN DAILY PO) Take by mouth      Ascorbic Acid (SHANNA-C PO) Take by mouth      Cyanocobalamin (VITAMIN B 12 PO) Take by mouth      Magnesium 100 MG CAPS Take by mouth Not sure of the mg      calcium carbonate (OSCAL) 500 MG TABS tablet Take 500 mg by mouth daily Not sure of the mg      levothyroxine (SYNTHROID) 75 MCG tablet Take 75 mcg by mouth daily        No current facility-administered medications for this visit. Allergies   Allergen Reactions    Contrast [Iodides]     Sulfa Antibiotics     Nickel Rash     REVIEW OF SYSTEMS  Constitutional: []? Fever []? Sweats []? Chills []? Recent Injury [x]? Denies all unless marked  HEENT:[]? Headache  []? Head Injury []? Hearing Loss  []? Sore Throat  []? Ear Ache [x]? Denies all unless marked  Spine:  []? Neck pain  []? Back pain  []? Sciaticia  [x]? Denies all unless marked  Cardiovascular:[]? Heart Disease []? Palpitations []? Chest Pain   [x]? Denies all unless marked  Pulmonary: []? Shortness of Breath [x]? Cough   [x]? Denies all unless marked  Psychiatric/Behavioral:[]? Depression []? Anxiety [x]? Denies all unless marked  Gastrointestinal: []? Nausea  []? Vomiting  []? Abdominal Pain  []? Constipation  []? Diarrhea  [x]? Denies all unless marked  Genitourinary:   []? Frequency  []? Urgency  []? Dysuria []? Incontinence  [x]? Denies all unless marked  Extremities: []? Pain  []? Swelling  [x]?  Denies all unless marked  Musculoskeletal: []? Myalgias  []? Joint Pain  []? Arthritis []? Muscle Cramps []? Muscle Twitches  [x]? Denies all unless marked  Sleep: [x]? Insomnia[]? Snoring []? Restless Legs  []? Sleep Apnea  []? Daytime Sleepiness  [x]? Denies all unless marked  Skin:[]? Rash []? Color Change [x]? Denies all unless marked   Neurological:[]? Visual Disturbance []? Memory Loss []? Loss of Balance []? Slurred Speech []? Weakness []? Seizures  []? Dizziness [x]? Denies all unless marked    The MA has completed the ROS with the patient. I have reviewed it in its' entirety with the patient and agree with the documentation. PHYSICAL EXAM    Constitutional -   BP (!) 144/71   Pulse 73   Temp 97.3 °F (36.3 °C)   Ht 5' 6\" (1.676 m)   Wt 125 lb (56.7 kg)   SpO2 97%   BMI 20.18 kg/m²    General appearance: No acute distress   EYES -   Conjunctiva normal  Pupillary exam as below, see CN exam in the neurologic exam  ENT-    No scars, masses, or lesions over external nose or ears  Hearing normal bilaterally to finger rub  Cardiovascular -   No clubbing, cyanosis, or edema   Pulmonary-   Good expansion, normal effort without use of accessory muscles  Musculoskeletal -   No significant wasting of muscles noted  Gait as below, see gait exam in the neurologic exam  Muscle strength, tone, stability as below. No bony deformities  Skin -   Warm, dry, and intact to inspection and palpation. No rash, erythema, or pallor  Psychiatric -   Mood, affect, and behavior appear normal    Memory as below see mental status examination in the neurologic exam    NEUROLOGICAL EXAM    Mental status   [x]Awake, alert, oriented   [x]Affect attention and concentration appear appropriate  []Recent and remote memory appears unremarkable  []Speech normal without dysarthria or aphasia, comprehension and repetition intact.    COMMENTS: intermittent dysarthria noted    Cranial Nerves [x]No VF deficit to confrontation,  no papilledema on fundoscopic exam.  [x]PERRLA, EOMI, no nystagmus, conjugate eye movements, no ptosis  [x]Face symmetric  [x]Facial sensation intact  [x]Tongue midline no atrophy or fasciculations present  [x]Palate midline, hearing to finger rub normal bilaterally  [x]Shoulder shrug and SCM testing normal bilaterally  COMMENTS:   Motor   []5/5 strength x 4 extremities  []Normal bulk and tone  []No tremor present  []No rigidity or bradykinesia noted  COMMENTS: mild resting tremor bilaterally, mild bradykinesia, no rigidity. Mild dyskinesias   Sensory  []Sensation intact to light touch, pin prick, vibration, and proprioception BLE  []Sensation intact to light touch, pin prick, vibration, and proprioception BUE  COMMENTS: Decreased LT, PP, Vib BLE   Coordination [x]FTN normal bilaterally   []HTS normal bilaterally  []TAL normal bilaterally. COMMENTS:   Reflexes  [x]Symmetric and non-pathological  [x]Toes down going bilaterally  [x]No clonus present  COMMENTS:   Gait                  []Normal steady gait    []Ataxic    []Spastic     []Magnetic     [x]Shuffling  COMMENTS: unsteady, shuffling, loss of arm swing bilaterally       LABS RECORD AND IMAGING REVIEW (As below and per HPI)  Reviewed prior records     ASSESSMENT:    Gladis Barrera is a 76y.o. year old female here for follow up of Parkinson's disease, memory loss and hallucinations. Memory loss and hallucinations have progressed since last visit, parkinsonisms are largely unchanged. On Sinemet 1 tablet five times daily, no side effects, dyskinesias noted. Tremor, rigidity, bradykinesia fairly well controlled with this regimen. Has had dyskinesias on higher doses previously so need to monitor for these. Hallucinations have worsened, she is acting on these hallucinations. On Seroquel, has failed Nuplazid previously.  Given worsening hallucinations, will stop Seroquel and add Risperdal. Will keep as long dose as possible to avoid worsening her underlying parkinsonisms, discussed with patient and son and they are aware that this could worsen parkinson's. Will have to weight risk vs benefit in this therapy and given worsening hallucination felt reasonable to add Risperdal today. She has been on Sinemet CR previously but noted dyskinesias while on this. She is not been able to tolerate dopamine agonists in the past (Requip). She is also tried MAO inhibitors as well [facility]). Given hallucinations as well would not re try dopamaine agonists. Memory is somewhat progressive, consider adding memory agent down the line but don't want to start too many medications at once. She has a personal care aide coming to her home twice daily and family is quite involved in care as well. Previously followed at [facility] and by Dr. Alma Frank, diagnosed about 20 years ago. I discussed this case with Dr. Declan Tripathi. Diagnosis Orders   1. Parkinson disease ([facility] [address]     2. Hallucinations  Urinalysis with Reflex to Culture   3. Memory loss          PLAN:  1. Urinalysis today to exclude acute source for hallucinations   2. Continue Sinemet 25/100 five times daily, titrate as needed but watch for dyskinesias   3. Wean and stop Seroquel   4. Risperdal 0.25mg nightly, titrate as tolerated. Discussed side effects with patient and sons, discussed black box warning. 5. Increase supervision at home, fall precautions, no driving, medication monitoring  6. Continue to follow memory clinically, add memory agent with any progression.    7. Follow up in 4 weeks, sooner with any worsening     August Fresh GARTH FABIAN

## 2022-05-18 NOTE — PROGRESS NOTES
REVIEW OF SYSTEMS    Constitutional: []Fever []Sweats []Chills [] Recent Injury [x] Denies all unless marked  HEENT:[]Headache  [] Head Injury [] Hearing Loss  [] Sore Throat  [] Ear Ache [x] Denies all unless marked  Spine:  [] Neck pain  [] Back pain  [] Sciaticia  [x] Denies all unless marked  Cardiovascular:[]Heart Disease []Palpitations [] Chest Pain   [x] Denies all unless marked  Pulmonary: []Shortness of Breath [x]Cough   [x] Denies all unless marked  Psychiatric/Behavioral:[] Depression [] Anxiety [x] Denies all unless marked  Gastrointestinal: []Nausea  []Vomiting  []Abdominal Pain  []Constipation  []Diarrhea  [x] Denies all unless marked  Genitourinary:   [] Frequency  [] Urgency  [] Dysuria [] Incontinence  [x] Denies all unless marked  Extremities: []Pain  []Swelling  [x] Denies all unless marked  Musculoskeletal: [] Myalgias  [] Joint Pain  [] Arthritis [] Muscle Cramps [] Muscle Twitches  [x] Denies all unless marked  Sleep: [x]Insomnia[]Snoring []Restless Legs  []Sleep Apnea  []Daytime Sleepiness  [x] Denies all unless marked  Skin:[] Rash [] Color Change [x] Denies all unless marked   Neurological:[]Visual Disturbance [] Memory Loss []Loss of Balance []Slurred Speech []Weakness []Seizures  [] Dizziness [x] Denies all unless marked

## 2022-06-22 ENCOUNTER — OFFICE VISIT (OUTPATIENT)
Dept: NEUROSURGERY | Age: 77
End: 2022-06-22
Payer: MEDICARE

## 2022-06-22 VITALS
WEIGHT: 125 LBS | HEIGHT: 66 IN | SYSTOLIC BLOOD PRESSURE: 98 MMHG | DIASTOLIC BLOOD PRESSURE: 50 MMHG | OXYGEN SATURATION: 98 % | BODY MASS INDEX: 20.09 KG/M2 | HEART RATE: 70 BPM

## 2022-06-22 DIAGNOSIS — R41.3 MEMORY LOSS: ICD-10-CM

## 2022-06-22 DIAGNOSIS — G20 PARKINSON DISEASE (HCC): Primary | ICD-10-CM

## 2022-06-22 DIAGNOSIS — R44.3 HALLUCINATIONS: ICD-10-CM

## 2022-06-22 PROCEDURE — 99213 OFFICE O/P EST LOW 20 MIN: CPT | Performed by: NURSE PRACTITIONER

## 2022-06-22 PROCEDURE — G8400 PT W/DXA NO RESULTS DOC: HCPCS | Performed by: NURSE PRACTITIONER

## 2022-06-22 PROCEDURE — G8420 CALC BMI NORM PARAMETERS: HCPCS | Performed by: NURSE PRACTITIONER

## 2022-06-22 PROCEDURE — 1036F TOBACCO NON-USER: CPT | Performed by: NURSE PRACTITIONER

## 2022-06-22 PROCEDURE — 1090F PRES/ABSN URINE INCON ASSESS: CPT | Performed by: NURSE PRACTITIONER

## 2022-06-22 PROCEDURE — 1123F ACP DISCUSS/DSCN MKR DOCD: CPT | Performed by: NURSE PRACTITIONER

## 2022-06-22 PROCEDURE — G8427 DOCREV CUR MEDS BY ELIG CLIN: HCPCS | Performed by: NURSE PRACTITIONER

## 2022-06-22 NOTE — PROGRESS NOTES
[facility] Neurology Office Note      Patient:   Sandrine Mckeon  MR#:    [id number]  Account Number:                         YOB: 1945  Date of Evaluation:  6/22/2022  Time of Note:                          10:03 AM  Primary/Referring Physician:  Shaheed Torrez MD   Consulting Physician:  Tara Caldwell DNP, APRN     FOLLOW UP    Chief Complaint   Patient presents with    Follow-up    Tremors    Discuss Medications       HISTORY OF PRESENT ILLNESS    Sandrine Mckeon is a 76y.o. year old female here follow up of Parkinson's, memory loss, hallucinations. She is accompanied by her son today. She was started on Risperdal at last visit but noted side effects, quite sedated. Her family contacted me and we stopped this medication. She is taking Sinemet 25/100 QID. Back on Seroquel 25mg nightly. Largely unchanged from prior visit. Tremor will wax and wane. Some shuffling noted. Intermittent bradykinesia and rigidity noted. No clear wearing off. No dyskinesias, has had dyskinesias at higher doses previously. Continues to noted hallucinations, visual and auditory. Failed Nuplazid. Memory is unchanged. Primarily short term in nature. Seems more confused in the evening. She does not drive. Has a personal care aide now that is coming twice a day. Needing assistance with dressing now. Using a bluetooth pill organizer for medications. She is in her own home. Sons check on her daily. They have cameras set up in the home that they monitor as well. She is sleeping well at night. She was on Neurontin for RLS but family felt this made hallucinations worse. RLS symptoms unchanged. She was diagnosed with PD in 2000 at [facility], followed by Dr. Suzy Rodriguez before as well. Has tried and failed Requip, Sinemet CR and Xadago.      Past Medical History:   Diagnosis Date    Hyperthyroidism     Parkinson disease ([facility] [address]        Past Surgical History:   Procedure Laterality Date    BRAIN SURGERY      CHOLECYSTECTOMY      THYROIDECTOMY, PARTIAL         History reviewed. No pertinent family history. Social History     Socioeconomic History    Marital status:      Spouse name: Not on file    Number of children: Not on file    Years of education: Not on file    Highest education level: Not on file   Occupational History    Not on file   Tobacco Use    Smoking status: Never Smoker    Smokeless tobacco: Never Used   Vaping Use    Vaping Use: Never used   Substance and Sexual Activity    Alcohol use: No    Drug use: No    Sexual activity: Not Currently     Partners: Male   Other Topics Concern    Not on file   Social History Narrative    Not on file     Social Determinants of Health     Financial Resource Strain:     Difficulty of Paying Living Expenses: Not on file   Food Insecurity:     Worried About Running Out of Food in the Last Year: Not on file    Nathan of Food in the Last Year: Not on file   Transportation Needs:     Lack of Transportation (Medical): Not on file    Lack of Transportation (Non-Medical):  Not on file   Physical Activity:     Days of Exercise per Week: Not on file    Minutes of Exercise per Session: Not on file   Stress:     Feeling of Stress : Not on file   Social Connections:     Frequency of Communication with Friends and Family: Not on file    Frequency of Social Gatherings with Friends and Family: Not on file    Attends Congregation Services: Not on file    Active Member of [address] [place] [address] [employer] or Organizations: Not on file    Attends Club or Organization Meetings: Not on file    Marital Status: Not on file   Intimate Partner Violence:     Fear of Current or Ex-Partner: Not on file    Emotionally Abused: Not on file    Physically Abused: Not on file    Sexually Abused: Not on file   Housing Stability:     Unable to Pay for Housing in the Last Year: Not on file    Number of [place] in the Last Year: Not on file    Unstable Housing in the Last Year: Not on file       Current Outpatient Medications   Medication Sig Dispense Refill    QUEtiapine (SEROQUEL) 25 MG tablet Take 4 tablets by mouth at bedtime Follow titration schedule (Patient taking differently: Take 25 mg by mouth at bedtime ) 120 tablet 3    carbidopa-levodopa (SINEMET)  MG per tablet TAKE 1 TABLET BY MOUTH FOUR TIMES DAILY (Patient taking differently: 4 times daily TAKE 1 TABLET BY MOUTH FOUR TIMES DAILY) 150 tablet 5    levothyroxine (SYNTHROID) 75 MCG tablet Take 75 mcg by mouth daily       XARELTO 20 MG TABS tablet Take 20 mg by mouth (Patient not taking: Reported on 6/22/2022)      Turmeric (QC TUMERIC COMPLEX) 500 MG CAPS Take 1,000 mg by mouth 2 times daily (Patient not taking: Reported on 6/22/2022)      loratadine (CLARITIN) 10 MG capsule Take 10 mg by mouth daily (Patient not taking: Reported on 6/22/2022)      Multiple Vitamin (MULTI VITAMIN DAILY PO) Take by mouth (Patient not taking: Reported on 6/22/2022)      Ascorbic Acid (SHANNA-C PO) Take by mouth (Patient not taking: Reported on 6/22/2022)      Cyanocobalamin (VITAMIN B 12 PO) Take by mouth (Patient not taking: Reported on 6/22/2022)      Magnesium 100 MG CAPS Take by mouth Not sure of the mg (Patient not taking: Reported on 6/22/2022)      calcium carbonate (OSCAL) 500 MG TABS tablet Take 500 mg by mouth daily Not sure of the mg (Patient not taking: Reported on 6/22/2022)       No current facility-administered medications for this visit. Allergies   Allergen Reactions    Contrast [Iodides]     Sulfa Antibiotics     Nickel Rash     REVIEW OF SYSTEMS  Constitutional: []? Fever []? Sweats []? Chills []? Recent Injury [x]? Denies all unless marked  HEENT:[]? Headache  []? Head Injury []? Hearing Loss  []? Sore Throat  []? Ear Ache [x]? Denies all unless marked  Spine:  []? Neck pain  []? Back pain  []? Sciaticia  [x]? Denies all unless marked  Cardiovascular:[]? Heart Disease []? Palpitations []? Chest Pain   [x]?  Denies all unless marked  Pulmonary: []? Shortness of Breath []? Cough   [x]? Denies all unless marked  Psychiatric/Behavioral:[]? Depression []? Anxiety [x]? Denies all unless marked  Gastrointestinal: []? Nausea  []? Vomiting  []? Abdominal Pain  []? Constipation  []? Diarrhea  [x]? Denies all unless marked  Genitourinary:   []? Frequency  []? Urgency  []? Dysuria []? Incontinence  [x]? Denies all unless marked  Extremities: []? Pain  []? Swelling  [x]? Denies all unless marked  Musculoskeletal: []? Myalgias  []? Joint Pain  []? Arthritis []? Muscle Cramps []? Muscle Twitches  [x]? Denies all unless marked  Sleep: []? Insomnia[]? Snoring []? Restless Legs  []? Sleep Apnea  []? Daytime Sleepiness  [x]? Denies all unless marked  Skin:[]? Rash []? Color Change [x]? Denies all unless marked   Neurological:[]? Visual Disturbance []? Memory Loss []? Loss of Balance []? Slurred Speech []? Weakness []? Seizures  []? Dizziness [x]? Denies all unless marked    The MA has completed the ROS with the patient. I have reviewed it in its' entirety with the patient and agree with the documentation. PHYSICAL EXAM    Constitutional -   BP (!) 98/50   Pulse 70   Ht 5' 6\" (1.676 m)   Wt 125 lb (56.7 kg)   SpO2 98%   BMI 20.18 kg/m²    General appearance: No acute distress   EYES -   Conjunctiva normal  Pupillary exam as below, see CN exam in the neurologic exam  ENT-    No scars, masses, or lesions over external nose or ears  Hearing normal bilaterally to finger rub  Cardiovascular -   No clubbing, cyanosis, or edema   Pulmonary-   Good expansion, normal effort without use of accessory muscles  Musculoskeletal -   No significant wasting of muscles noted  Gait as below, see gait exam in the neurologic exam  Muscle strength, tone, stability as below. No bony deformities  Skin -   Warm, dry, and intact to inspection and palpation.     No rash, erythema, or pallor  Psychiatric -   Mood, affect, and behavior appear normal    Memory as below see mental status examination in the neurologic exam    NEUROLOGICAL EXAM    Mental status   [x]Awake, alert, oriented   [x]Affect attention and concentration appear appropriate  []Recent and remote memory appears unremarkable  []Speech normal without dysarthria or aphasia, comprehension and repetition intact. COMMENTS: intermittent dysarthria noted; mild cognitive impairment    Cranial Nerves [x]No VF deficit to confrontation,  no papilledema on fundoscopic exam.  [x]PERRLA, EOMI, no nystagmus, conjugate eye movements, no ptosis  [x]Face symmetric  [x]Facial sensation intact  [x]Tongue midline no atrophy or fasciculations present  [x]Palate midline, hearing to finger rub normal bilaterally  [x]Shoulder shrug and SCM testing normal bilaterally  COMMENTS:   Motor   []5/5 strength x 4 extremities  []Normal bulk and tone  []No tremor present  []No rigidity or bradykinesia noted  COMMENTS: mild resting tremor bilaterally, mild bradykinesia, no rigidity. Sensory  []Sensation intact to light touch, pin prick, vibration, and proprioception BLE  []Sensation intact to light touch, pin prick, vibration, and proprioception BUE  COMMENTS: Decreased LT, PP, Vib BLE   Coordination [x]FTN normal bilaterally   []HTS normal bilaterally  []TAL normal bilaterally. COMMENTS:   Reflexes  [x]Symmetric and non-pathological  [x]Toes down going bilaterally  [x]No clonus present  COMMENTS:   Gait                  []Normal steady gait    []Ataxic    []Spastic     []Magnetic     [x]Shuffling  COMMENTS: unsteady, shuffling, loss of arm swing bilaterally       LABS RECORD AND IMAGING REVIEW (As below and per HPI)  Reviewed prior records     ASSESSMENT:    Rosy Dancer is a 76y.o. year old female here for follow up of Parkinson's disease, memory loss and hallucinations. She was started on Risperdal at last visit for hallucinations but no longer taking, noted oversedation with this. Parkinsonisms unchanged, symptoms wax and wane.  She is on Sinemet 1 tablet four times daily, no dyskinesias. Tremor, rigidity, bradykinesia fairly well controlled with this regimen. Has had dyskinesias on higher doses previously so need to monitor for these. Hallucinations unchanged, failed Nuplazid and Risperdal now. On low dose Seroquel, will continue this dose and titrate higher as needed. She has had quite a few medication changes recently so family is somewhat hesitant to continue to increase/make changes, felt very reasonable to continue Seroquel low dose. Memory is unchanged from prior. Son discussed memory agent with me today, he is somewhat hesitant to add one today. At this point, I'm not sure we would see any type of drastic improvements. Consider adding down the line but will hold off today. She has a personal care aide coming to her home twice daily and family is quite involved in care as well. She has been on Sinemet CR previously but noted dyskinesias while on this. She is not been able to tolerate dopamine agonists in the past (Requip). She is also tried MAO inhibitors as well [place]). Given hallucinations as well would not re try dopamaine agonists. Previously followed at [address] [place] [address] and by Dr. Ryan Vazquez, diagnosed about 20 years ago. I discussed this case with Dr. Gwyn Leigh. Diagnosis Orders   1. Parkinson disease ([facility] [address]     2. Hallucinations     3. Memory loss        PLAN:  1. Continue Sinemet 25/100 QID, titrate as needed but watch for dyskinesias. 2. Continue Seroquel 25mg nightly. Titrate as needed  3. Increase supervision at home, fall precautions, no driving, medication monitoring  4. Continue to follow memory clinically, add memory agent with any progression.    5. Follow up in 3 months, sooner with any worsening     Chip Alejo DNP, APRN

## 2022-07-20 RX ORDER — QUETIAPINE FUMARATE 100 MG/1
100 TABLET, FILM COATED ORAL EVERY EVENING
Qty: 90 TABLET | Refills: 1 | Status: SHIPPED | OUTPATIENT
Start: 2022-07-20 | End: 2022-09-21 | Stop reason: ALTCHOICE

## 2022-07-20 NOTE — TELEPHONE ENCOUNTER
Requested Prescriptions     Pending Prescriptions Disp Refills    QUEtiapine (SEROQUEL) 100 MG tablet [Pharmacy Med Name: QUETIAPINE 100MG TABLETS] 90 tablet 1     Sig: TAKE 1 TABLET BY MOUTH EVERY EVENING       Last Office Visit: 6/22/2022  Next Office Visit: 9/21/2022  Last Medication Refill: 6-13-22

## 2022-09-06 NOTE — TELEPHONE ENCOUNTER
Requested Prescriptions     Pending Prescriptions Disp Refills    carbidopa-levodopa (SINEMET)  MG per tablet [Pharmacy Med Name: CARBIDOPA/LEVODOPA 25-100MG TABS] 150 tablet 5     Sig: TAKE 1 TABLET BY MOUTH FOUR TIMES DAILY       Last Office Visit: 6/22/2022  Next Office Visit: 9/4/2022  Last Medication Refill: 1/19/22 with 5 RF

## 2022-09-21 ENCOUNTER — TELEPHONE (OUTPATIENT)
Dept: NEUROSURGERY | Age: 77
End: 2022-09-21

## 2022-09-21 ENCOUNTER — OFFICE VISIT (OUTPATIENT)
Dept: NEUROSURGERY | Age: 77
End: 2022-09-21
Payer: MEDICARE

## 2022-09-21 VITALS
DIASTOLIC BLOOD PRESSURE: 70 MMHG | HEIGHT: 66 IN | SYSTOLIC BLOOD PRESSURE: 142 MMHG | OXYGEN SATURATION: 97 % | WEIGHT: 125 LBS | BODY MASS INDEX: 20.09 KG/M2 | HEART RATE: 72 BPM

## 2022-09-21 DIAGNOSIS — R44.3 HALLUCINATIONS: ICD-10-CM

## 2022-09-21 DIAGNOSIS — R41.3 MEMORY LOSS: ICD-10-CM

## 2022-09-21 DIAGNOSIS — G20 PARKINSON DISEASE (HCC): Primary | ICD-10-CM

## 2022-09-21 DIAGNOSIS — R26.89 IMBALANCE: ICD-10-CM

## 2022-09-21 PROCEDURE — 1090F PRES/ABSN URINE INCON ASSESS: CPT | Performed by: NURSE PRACTITIONER

## 2022-09-21 PROCEDURE — G8400 PT W/DXA NO RESULTS DOC: HCPCS | Performed by: NURSE PRACTITIONER

## 2022-09-21 PROCEDURE — G8420 CALC BMI NORM PARAMETERS: HCPCS | Performed by: NURSE PRACTITIONER

## 2022-09-21 PROCEDURE — G8427 DOCREV CUR MEDS BY ELIG CLIN: HCPCS | Performed by: NURSE PRACTITIONER

## 2022-09-21 PROCEDURE — 1123F ACP DISCUSS/DSCN MKR DOCD: CPT | Performed by: NURSE PRACTITIONER

## 2022-09-21 PROCEDURE — 1036F TOBACCO NON-USER: CPT | Performed by: NURSE PRACTITIONER

## 2022-09-21 PROCEDURE — 99213 OFFICE O/P EST LOW 20 MIN: CPT | Performed by: NURSE PRACTITIONER

## 2022-09-21 NOTE — PROGRESS NOTES
[facility] Neurology Office Note      Patient:   Tammi Lu  MR#:    [id number]  Account Number:                         YOB: 1945  Date of Evaluation:  9/21/2022  Time of Note:                          12:42 PM  Primary/Referring Physician:  Darron Bush MD   Consulting Physician:  Florina Steven DNP, APRN     FOLLOW UP    Chief Complaint   Patient presents with    Follow-up     C/o loss of balance and some confusion at times    Tremors       HISTORY OF PRESENT ILLNESS    Tammi Lu is a 76y.o. year old female here follow up of Parkinson's, memory loss, hallucinations. She is accompanied by her son today. Has noted more imbalance recently. Denies falls. Tremor will wax and wane. Some shuffling noted. Intermittent bradykinesia and rigidity noted. No clear wearing off. No dyskinesias, has had dyskinesias at higher doses previously. She is on Sinemet 1 tablet QID. Notes intermittent hallucinations, visual and auditory. Taking Seroquel 25mg at night. Failed Risperdal and Nuplazid. Some mild progression of memory loss. Primarily short term in nature. Seems more confused in the evening. She does not drive. Needing assistance with dressing now. Using a [employer] pill organizer for medications. She is in her own home. No concern for wandering. Sons check on her daily. They have cameras set up in the home that they monitor as well. She is sleeping well at night. She was on Neurontin for RLS but family felt this made hallucinations worse. RLS symptoms unchanged. She was diagnosed with PD in 2000 at [facility], followed by Dr. Olena Isaacs before as well. Has tried and failed Requip, Sinemet CR and Xadago. Past Medical History:   Diagnosis Date    Hyperthyroidism     Parkinson disease ([facility] [address]        Past Surgical History:   Procedure Laterality Date    BRAIN SURGERY      CHOLECYSTECTOMY      THYROIDECTOMY, PARTIAL         History reviewed. No pertinent family history.     Social History     Socioeconomic History    Marital status:      Spouse name: Not on file    Number of children: Not on file    Years of education: Not on file    Highest education level: Not on file   Occupational History    Not on file   Tobacco Use    Smoking status: Never    Smokeless tobacco: Never   Vaping Use    Vaping Use: Never used   Substance and Sexual Activity    Alcohol use: No    Drug use: No    Sexual activity: Not Currently     Partners: Male   Other Topics Concern    Not on file   Social History Narrative    Not on file     Social Determinants of Health     Financial Resource Strain: Not on file   Food Insecurity: Not on file   Transportation Needs: Not on file   Physical Activity: Not on file   Stress: Not on file   Social Connections: Not on file   Intimate Partner Violence: Not on file   Housing Stability: Not on file       Current Outpatient Medications   Medication Sig Dispense Refill    carbidopa-levodopa (SINEMET)  MG per tablet TAKE 1 TABLET BY MOUTH FOUR TIMES DAILY 150 tablet 5    QUEtiapine (SEROQUEL) 25 MG tablet Take 4 tablets by mouth at bedtime Follow titration schedule (Patient taking differently: Take 25 mg by mouth at bedtime) 120 tablet 3    levothyroxine (SYNTHROID) 75 MCG tablet Take 75 mcg by mouth daily        No current facility-administered medications for this visit.        Allergies   Allergen Reactions    Contrast [Iodides]     Sulfa Antibiotics     Nickel Rash     REVIEW OF SYSTEMS  Constitutional: []Fever []Sweats []Chills [] Recent Injury [x] Denies all unless marked  HEENT:[]Headache  [] Head Injury [] Hearing Loss  [] Sore Throat  [] Ear Ache [x] Denies all unless marked  Spine:  [] Neck pain  [] Back pain  [] Sciaticia  [x] Denies all unless marked  Cardiovascular:[]Heart Disease []Palpitations [] Chest Pain   [x] Denies all unless marked  Pulmonary: []Shortness of Breath []Cough   [x] Denies all unless marked  Psychiatric/Behavioral:[] Depression [] Anxiety [x] Denies all unless marked  Gastrointestinal: []Nausea  []Vomiting  []Abdominal Pain  []Constipation  []Diarrhea  [x] Denies all unless marked  Genitourinary:   [] Frequency  [] Urgency  [] Dysuria [] Incontinence  [x] Denies all unless marked  Extremities: []Pain  []Swelling  [x] Denies all unless marked  Musculoskeletal: [] Myalgias  [] Joint Pain  [] Arthritis [] Muscle Cramps [] Muscle Twitches  [x] Denies all unless marked  Sleep: []Insomnia[]Snoring []Restless Legs  []Sleep Apnea  []Daytime Sleepiness  [x] Denies all unless marked  Skin:[] Rash [] Color Change [x] Denies all unless marked   Neurological:[]Visual Disturbance [] Memory Loss [x]Loss of Balance []Slurred Speech []Weakness []Seizures  [] Dizziness [x] Denies all unless marked    The MA has completed the ROS with the patient. I have reviewed it in its' entirety with the patient and agree with the documentation. PHYSICAL EXAM    Constitutional -   BP (!) 142/70   Pulse 72   Ht 5' 6\" (1.676 m)   Wt 125 lb (56.7 kg)   SpO2 97%   BMI 20.18 kg/m²    General appearance: No acute distress   EYES -   Conjunctiva normal  Pupillary exam as below, see CN exam in the neurologic exam  ENT-    No scars, masses, or lesions over external nose or ears  Hearing normal bilaterally to finger rub  Cardiovascular -   No clubbing, cyanosis, or edema   Pulmonary-   Good expansion, normal effort without use of accessory muscles  Musculoskeletal -   No significant wasting of muscles noted  Gait as below, see gait exam in the neurologic exam  Muscle strength, tone, stability as below. No bony deformities  Skin -   Warm, dry, and intact to inspection and palpation.     No rash, erythema, or pallor  Psychiatric -   Mood, affect, and behavior appear normal    Memory as below see mental status examination in the neurologic exam    NEUROLOGICAL EXAM    Mental status   [x]Awake, alert, oriented   [x]Affect attention and concentration appear appropriate  []Recent and remote memory appears unremarkable  []Speech normal without dysarthria or aphasia, comprehension and repetition intact. COMMENTS: dysarthria noted; mild cognitive impairment    Cranial Nerves [x]No VF deficit to confrontation,  no papilledema on fundoscopic exam.  [x]PERRLA, EOMI, no nystagmus, conjugate eye movements, no ptosis  [x]Face symmetric  [x]Facial sensation intact  [x]Tongue midline no atrophy or fasciculations present  [x]Palate midline, hearing to finger rub normal bilaterally  [x]Shoulder shrug and SCM testing normal bilaterally  COMMENTS:   Motor   []5/5 strength x 4 extremities  []Normal bulk and tone  []No tremor present  []No rigidity or bradykinesia noted  COMMENTS: mild resting tremor bilaterally, mild bradykinesia, no rigidity. Sensory  []Sensation intact to light touch, pin prick, vibration, and proprioception BLE  []Sensation intact to light touch, pin prick, vibration, and proprioception BUE  COMMENTS: Decreased LT, PP, Vib BLE   Coordination [x]FTN normal bilaterally   []HTS normal bilaterally  []TAL normal bilaterally. COMMENTS:   Reflexes  [x]Symmetric and non-pathological  [x]Toes down going bilaterally  [x]No clonus present  COMMENTS:   Gait                  []Normal steady gait    []Ataxic    []Spastic     []Magnetic     [x]Shuffling  COMMENTS: unsteady, shuffling, loss of arm swing bilaterally       LABS RECORD AND IMAGING REVIEW (As below and per HPI)  Reviewed prior records     ASSESSMENT:    Fredo Martinez is a 76y.o. year old female here for follow up of PD, memory loss and hallucinations. Largely unchanged from prior visit. Parkinsonisms unchanged, symptoms wax and wane. She is on Sinemet 1 tablet four times daily, no dyskinesias. Tremor, rigidity, bradykinesia fairly well controlled with this regimen. Has had dyskinesias on higher doses previously so need to monitor for these. She has been on Sinemet CR previously but noted dyskinesias while on this.  She is not been able to tolerate dopamine agonists in the past (Requip). She is also tried MAO inhibitors as well [place]). Given hallucinations as well would not re try dopamaine agonists. Hallucinations unchanged, failed Nuplazid and Risperdal now. On low dose Seroquel, will continue this dose and titrate higher as needed. Some progression of memory loss since last visit. Family is hesitant to add memory agent today, felt reasonable. At this point, I'm not sure we would see any type of drastic improvements. Family is quite involved in her care. Previously followed at [facility] and by Dr. Briseida Barajas, diagnosed about 20 years ago. Diagnosis Orders   1. Parkinson disease [facility])  [address]      2. Hallucinations        3. Memory loss        4. [address], [place]          PLAN:  1. Continue Sinemet 25/100 QID, titrate as needed but watch for dyskinesias. 2. Continue Seroquel 25mg nightly. Titrate as needed  3. Hold off on memory agent today, family defers. Increase supervision at home, fall precautions, no driving, medication monitoring  4. Home health PT   5.  Follow up in 4 months, sooner with any worsening     Amy Marinelli DNP, APRN

## 2022-09-21 NOTE — TELEPHONE ENCOUNTER
They received referral but need the start of care date to be next week. Can someone please call to authorize.

## 2022-09-21 NOTE — PROGRESS NOTES

## 2022-09-28 NOTE — TELEPHONE ENCOUNTER
Francisca with [facility] called stating she had just done a start of care [facility] for Physical Therapy for this patient. She voiced that originally this was supposed to be for physical therapy but during intake there was a stage 2 pressure sore noted. [facility] requested that they add some nurse visits along with the physical therapy for teaching with patient and family. I voiced this would be fine to do and that I would let [facility] know. Natalya Marcus voiced understanding.

## 2022-10-18 ENCOUNTER — TELEPHONE (OUTPATIENT)
Dept: NEUROLOGY | Age: 77
End: 2022-10-18

## 2022-10-18 NOTE — TELEPHONE ENCOUNTER
Genevieve Canas from home health physical therapy called and wanted Leah Morin to know pt is being discharged from PT today.

## 2023-03-29 ENCOUNTER — OFFICE VISIT (OUTPATIENT)
Dept: NEUROLOGY | Age: 78
End: 2023-03-29
Payer: MEDICARE

## 2023-03-29 VITALS
HEART RATE: 66 BPM | BODY MASS INDEX: 20.09 KG/M2 | HEIGHT: 66 IN | DIASTOLIC BLOOD PRESSURE: 54 MMHG | WEIGHT: 125 LBS | OXYGEN SATURATION: 100 % | SYSTOLIC BLOOD PRESSURE: 84 MMHG

## 2023-03-29 DIAGNOSIS — G20 PARKINSON'S DISEASE (HCC): Primary | ICD-10-CM

## 2023-03-29 DIAGNOSIS — R44.3 HALLUCINATION: ICD-10-CM

## 2023-03-29 DIAGNOSIS — R41.3 MEMORY LOSS: ICD-10-CM

## 2023-03-29 PROCEDURE — 1123F ACP DISCUSS/DSCN MKR DOCD: CPT | Performed by: NURSE PRACTITIONER

## 2023-03-29 PROCEDURE — 99213 OFFICE O/P EST LOW 20 MIN: CPT | Performed by: NURSE PRACTITIONER

## 2023-03-29 PROCEDURE — 1036F TOBACCO NON-USER: CPT | Performed by: NURSE PRACTITIONER

## 2023-03-29 PROCEDURE — G8484 FLU IMMUNIZE NO ADMIN: HCPCS | Performed by: NURSE PRACTITIONER

## 2023-03-29 PROCEDURE — G8427 DOCREV CUR MEDS BY ELIG CLIN: HCPCS | Performed by: NURSE PRACTITIONER

## 2023-03-29 PROCEDURE — G8400 PT W/DXA NO RESULTS DOC: HCPCS | Performed by: NURSE PRACTITIONER

## 2023-03-29 PROCEDURE — G8420 CALC BMI NORM PARAMETERS: HCPCS | Performed by: NURSE PRACTITIONER

## 2023-03-29 PROCEDURE — 1090F PRES/ABSN URINE INCON ASSESS: CPT | Performed by: NURSE PRACTITIONER

## 2023-03-29 NOTE — PROGRESS NOTES
REVIEW OF SYSTEMS    Constitutional: []Fever []Sweat []Chills [] Recent Injury [x] Denies all unless marked  HEENT:[]Headache  [] Head Injury/Hearing Loss  [] Sore Throat  [] Ear Ache/Dizziness  [x] Denies all unless marked  Spine:  [] Neck pain  [] Back pain  [] Sciaticia  [x] Denies all unless marked  Cardiovascular:[]Heart Disease []Chest Pain [] Palpitations  [x] Denies all unless marked  Pulmonary: []Shortness of Breath []Cough   [x] Denies all unless marke  Gastrointestinal: []Nausea  []Vomiting  []Abdominal Pain  []Constipation  []Diarrhea  []Dark Bloody Stools  [x] Denies all unless marked  Psychiatric/Behavioral:[] Depression [] Anxiety [x] Denies all unless marked  Genitourinary:   [] Frequency  [] Urgency  [] Incontinence [] Pain with Urination  [x] Denies all unless marked  Extremities: []Pain  []Swelling  [x] Denies all unless marked  Musculoskeletal: [] Muscle Pain  [] Joint Pain  [] Arthritis [] Muscle Cramps [] Muscle Twitches  [x] Denies all unless marked  Sleep: [] Insomnia [] Snoring [] Restless Legs [] Sleep Apnea  [] Daytime Sleepiness  [x] Denies all unless marked  Skin:[] Rash [] Skin Discoloration [x] Denies all unless marked   Neurological: []Visual Disturbance/Memory Loss [] Loss of Balance [] Slurred Speech/Weakness [] Seizures  [] Vertigo/Dizziness [x] Denies all unless marked
REVIEW OF SYSTEMS    Constitutional: []Fever []Sweats []Chills [] Recent Injury [x] Denies all unless marked  HEENT:[]Headache  [] Head Injury [] Hearing Loss  [] Sore Throat  [] Ear Ache [x] Denies all unless marked  Spine:  [] Neck pain  [] Back pain  [] Sciaticia  [x] Denies all unless marked  Cardiovascular:[]Heart Disease []Palpitations [] Chest Pain   [x] Denies all unless marked  Pulmonary: []Shortness of Breath []Cough   [x] Denies all unless marked  Psychiatric/Behavioral:[] Depression [] Anxiety [x] Denies all unless marked  Gastrointestinal: []Nausea  []Vomiting  []Abdominal Pain  []Constipation  []Diarrhea  [x] Denies all unless marked  Genitourinary:   [] Frequency  [] Urgency  [] Dysuria [] Incontinence  [x] Denies all unless marked  Extremities: []Pain  []Swelling  [x] Denies all unless marked  Musculoskeletal: [] Myalgias  [] Joint Pain  [] Arthritis [] Muscle Cramps [] Muscle Twitches  [x] Denies all unless marked  Sleep: []Insomnia[]Snoring []Restless Legs  []Sleep Apnea  []Daytime Sleepiness  [x] Denies all unless marked  Skin:[] Rash [] Color Change [x] Denies all unless marked   Neurological:[]Visual Disturbance [] Memory Loss []Loss of Balance []Slurred Speech []Weakness []Seizures  [] Dizziness [x] Denies all unless marked
status   [x]Awake, alert, oriented   [x]Affect attention and concentration appear appropriate  []Recent and remote memory appears unremarkable  []Speech normal without dysarthria or aphasia, comprehension and repetition intact. COMMENTS: dysarthria noted; mild cognitive impairment    Cranial Nerves [x]No VF deficit to confrontation,  no papilledema on fundoscopic exam.  [x]PERRLA, EOMI, no nystagmus, conjugate eye movements, no ptosis  [x]Face symmetric  [x]Facial sensation intact  [x]Tongue midline no atrophy or fasciculations present  [x]Palate midline, hearing to finger rub normal bilaterally  [x]Shoulder shrug and SCM testing normal bilaterally  COMMENTS:   Motor   []5/5 strength x 4 extremities  []Normal bulk and tone  []No tremor present  []No rigidity or bradykinesia noted  COMMENTS: mild resting tremor bilaterally, mild bradykinesia, no rigidity. Dyskinesias. Facial grimacing, tongue protrusion, oral movements noted    Sensory  []Sensation intact to light touch, pin prick, vibration, and proprioception BLE  []Sensation intact to light touch, pin prick, vibration, and proprioception BUE  COMMENTS: Decreased LT, PP, Vib BLE   Coordination [x]FTN normal bilaterally   []HTS normal bilaterally  []TAL normal bilaterally. COMMENTS:   Reflexes  [x]Symmetric and non-pathological  [x]Toes down going bilaterally  [x]No clonus present  COMMENTS:   Gait                  []Normal steady gait    []Ataxic    []Spastic     []Magnetic     [x]Shuffling  COMMENTS: unsteady, shuffling, loss of arm swing bilaterally       LABS RECORD AND IMAGING REVIEW (As below and per HPI)  Reviewed prior records     ASSESSMENT:    Zacarias Lazaro is a [de-identified]y.o. year old female here for follow up of PD, memory loss and hallucinations. She has had progression since last visit. On exam today she has dyskinesias, facial movements, hallucinations. Her son with her today reports this is a \"bad day\" but other days she is more at her baseline.

## 2023-04-24 NOTE — TELEPHONE ENCOUNTER
Requested Prescriptions     Pending Prescriptions Disp Refills    carbidopa-levodopa (SINEMET)  MG per tablet [Pharmacy Med Name: CARBIDOPA/LEVODOPA 25-100MG TABS] 150 tablet 5     Sig: TAKE 1 TABLET BY MOUTH FOUR TIMES DAILY       Last Office Visit: 9/21/2022  Next Office Visit: 7/19/23  Last Medication Refill: 9/6/22 with 5 RF

## 2023-05-08 ENCOUNTER — TELEPHONE (OUTPATIENT)
Dept: NEUROLOGY | Age: 78
End: 2023-05-08

## 2023-05-08 NOTE — TELEPHONE ENCOUNTER
Son called stated that patient was in nursing home and now going to be in a private assist living care. Did want us to know that Dr Halie Serna took her off the Seroquel  she has been off this med for about 4 days but she will need a new script if needed.  Please advise

## 2023-05-10 NOTE — TELEPHONE ENCOUNTER
She was on a pretty low dose of Seroquel so I think it would be fine to be left at off of her med list right now.   If things worsen then we can always add it back

## 2023-05-10 NOTE — TELEPHONE ENCOUNTER
Son stated that she was doing ok without the Seroquel at this time. He didn't go into a lot of detail he was trying to get her moved from one facility to another one.

## 2023-05-10 NOTE — TELEPHONE ENCOUNTER
Called patient son back left a very detailed message in regards to his mom, per [place]. She was on a pretty low dose of Seroquel so I think it would be fine to be left at off of her med list right now.   If things worsen then we can always add it back  Told son Natalya Myers if he needs anything to return my call @ [phone] ask for Jobie Saint or Gina TOLLIVER

## 2023-05-11 NOTE — TELEPHONE ENCOUNTER
Called patients son again left a second vm asked him to return my call @ [phone] wanting to make sure he received my last vm about his mom and her medication.

## 2023-05-12 ENCOUNTER — PATIENT MESSAGE (OUTPATIENT)
Dept: NEUROLOGY | Age: 78
End: 2023-05-12

## 2023-05-12 RX ORDER — QUETIAPINE FUMARATE 25 MG/1
25 TABLET, FILM COATED ORAL DAILY
Qty: 90 TABLET | Refills: 1 | Status: SHIPPED | OUTPATIENT
Start: 2023-05-12

## 2023-06-20 ENCOUNTER — TELEPHONE (OUTPATIENT)
Dept: NEUROLOGY | Age: 78
End: 2023-06-20

## 2023-06-20 DIAGNOSIS — L89.309: Primary | ICD-10-CM

## 2023-06-20 NOTE — TELEPHONE ENCOUNTER
Spoke with Toshia James to let him know that [facility] is not accepting new patients at this time, and that I could send the referral to [facility] and that they would be able to send a skilled nurse out by the end of the week. Toshia James stated that would be great, and Thanked me for my help.      Order faxed to [facility] [phone]

## 2023-07-06 ENCOUNTER — TELEPHONE (OUTPATIENT)
Dept: NEUROLOGY | Age: 78
End: 2023-07-06

## 2023-07-06 NOTE — TELEPHONE ENCOUNTER
[place] with [facility] called and left voicemail requesting pain medication for patient. Pt currently is at [facility]. [place] rteports she is stiff and aching in the morning and has a wound on her bottom that is currently receiving wound care. [facility] request a morning PRN dose of pain medications and a PRN dose of medication for her wound care.   Her call back number is [phone]

## 2024-05-16 NOTE — TELEPHONE ENCOUNTER
Ana Ann has requested a refill on her medication.      Last office visit : 9/21/2022   Next office visit : 5/16/2024   Last medication refill :4/24/2023        Requested Prescriptions     Pending Prescriptions Disp Refills    carbidopa-levodopa (SINEMET)  MG per tablet [Pharmacy Med Name: CARBIDOPA/LEVODOPA 25-100MG TABS] 150 tablet 5     Sig: TAKE 1 TABLET BY MOUTH FOUR TIMES DAILY